# Patient Record
Sex: FEMALE | Race: WHITE | Employment: FULL TIME | URBAN - METROPOLITAN AREA
[De-identification: names, ages, dates, MRNs, and addresses within clinical notes are randomized per-mention and may not be internally consistent; named-entity substitution may affect disease eponyms.]

---

## 2019-08-30 ENCOUNTER — HOSPITAL ENCOUNTER (OUTPATIENT)
Dept: PHYSICAL THERAPY | Age: 79
Discharge: HOME | End: 2019-08-30
Payer: COMMERCIAL

## 2019-08-30 PROCEDURE — 97170 ATHLETIC TRN EVAL MOD CMPLX: CPT

## 2019-08-30 PROCEDURE — 97110 THERAPEUTIC EXERCISES: CPT

## 2019-08-30 PROCEDURE — 97035 APP MDLTY 1+ULTRASOUND EA 15: CPT

## 2019-08-30 NOTE — PROGRESS NOTES
Chapin Black  : 1940  Primary: Erin Bland Rpn  Secondary:  [address] @ [place] [address].  [phone]   [phone]      OUTPATIENT REHABILITATION: Daily Treatment Note  2019   TREATMENT PLAN:  Effective Dates: 2019 TO 2019 (30 days). Frequency/Duration: 3 times a week for 30 Days  GOALS: (Goals have been discussed and agreed upon with patient.)  Short-Term Functional Goals: Time Frame: 1 week  1. Patient will be compliant with her home exercise and treatment instructions. Discharge Goals: Time Frame: 4 weeks  Patient will increase her flexibility through the iliopsoas musculature to allow for pain-free motion and functional pelvic symmetry to tolerate ADL's. Patient will report pain-free positioning in supine and side-lying to allow for a return to her normal  sleep pattern.  ________________________________________________________________________  Pre-treatment Symptoms/Complaints: Right hip stiffness. Pain: Initial: 2/10 Post Session:  2,3/10 Sore after stretching   Medications Last Reviewed:  2019  Updated Objective Findings:  See evaluation note from today     TREATMENT:   THERAPEUTIC EXERCISE: (See flow sheet for minutes):  Exercises per grid below to improve mobility. Required moderate verbal and manual cues to promote proper body mechanics. Progressed repetitions as indicated. MODALITIES: (See flow sheet for  minutes): Ultrasound was used today secondary to the patient having tightened structures limiting joint motion that require an increase in extensibility and to reduce pain over iliopsoas bursa. Vandana Singh Ultrasound was used today to reduce pain and increase tendon flexibility. Date: 19       Modalities: 8 min       Ultrasound, 1Hz, Cont. 1.0 MZ 8 min                       Therapeutic Exercise: 25 min       Lunge Hip Flexor Stretch with manual cueing Hold 5 sec.   X 5       Supine Pelvic tilt with hip flexor manual stretch Hold 10 sec. X 5       Side-Lying Hip Flexor Stretch with manual assist Hold 10 sec. X 5       Prone Press-Up Stretch with manual assist Hold 5 sec. X 5                       Proprioceptive Activities:                        Manual Therapy:                Therapeutic Activities:                  HEP: Given illustrated instructions for exercises and ice application. [employer] Portal  Treatment/Session Summary:    · Response to Treatment:  No reported increase in symptoms from initiation of flexibility exercises. Patient demonstrated understanding of home instructions. .  · Communication/Consultation:  None today  · Equipment provided today:  None today  · Recommendations/Intent for next treatment session: Next visit will focus on increasing flexibility of hip flexors and decreasing bursal irritation of the iliopsoas region. Total Treatment Billable Duration:  60 min.   PT Patient Time In/Time Out  Time In: 1000  Time Out: [facility], [facility]

## 2019-08-30 NOTE — THERAPY EVALUATION
Nestor Black  : 1940 [address], [facility] [place].  [phone]   [phone]        OUTPATIENT REHABILITATION:Initial Assessment 2019         ICD-10: Treatment Diagnosis: Pain in right hip (M25.551)  Psoas tendinitis, right hip (M76.11)  Precautions/Allergies:   Patient has no allergy information on record. Fall Risk Score:     Ambulatory/Rehab Services H2 Model Falls Risk Assessment    Risk Factors:       No Risk Factors Identified Ability to Rise from Chair:       (0)  Ability to rise in a single movement    Falls Prevention Plan:       No modifications necessary   Total: (5 or greater = High Risk): 0     [facility] of [employer]. All Rights Reserved. [facility] [employer] Patent #[id number]. Federal Law prohibits the replication, distribution or use without written permission from [facility] [employer]     MD Orders: Evaluate/Treat MEDICAL/REFERRING DIAGNOSIS:  Leg pain, right [M79.604]   DATE OF ONSET: Insidious onset of 2-4 week duration  REFERRING PHYSICIAN: Melo Sierra MD  RETURN PHYSICIAN APPOINTMENT: To be determined. INITIAL ASSESSMENT:  Ms. Gilles Boudreaux presents with pain, decreased functional mobility in the right anterior hip and right hip flexor weakness. Patient will require therapy interventions to resolve pain and improve function. PROBLEM LIST (Impacting functional limitations):  1. Decreased ADL/Functional Activities  2. Increased Pain  3. Decreased Activity Tolerance  4. Decreased Flexibility/Joint Mobility INTERVENTIONS PLANNED:  1. Home Exercise Program (HEP)  2. Manual Therapy  3. Range of Motion (ROM)  4. Therapeutic Exercise/Strengthening  5. Ultrasound (US)  6. Aquatics    TREATMENT PLAN:  Effective Dates: 2019 TO 2019 (30 days).  Frequency/Duration: 3 times a week for 30 Days  GOALS: (Goals have been discussed and agreed upon with patient.)  Short-Term Functional Goals: Time Frame: 1 week  1. Patient will be compliant with her home exercise and treatment instructions. Discharge Goals: Time Frame: 4 weeks  Patient will increase her flexibility through the iliopsoas musculature to allow for pain-free motion and functional pelvic symmetry to tolerate ADL's. Patient will report pain-free positioning in supine and side-lying to allow for a return to her normal sleep pattern. Rehabilitation Potential For Stated Goals: Good  Regarding Eloise Black's therapy, I certify that the treatment plan above will be carried out by a therapist or under their direction. 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 Activity and movement seem to relieve the pain, but the current level of discomfort is interrupting the patient's ability to sleep and find a comfortable position. Onset occurred approximately 2-4 weeks ago and was proceeded by a fall this summer, which resulted in a left ankle sprain and low back pain. She has recovered from the fall and denies any lingering back pain. The patient localizes the hip pain and tightness to her groin area, radiating down the thigh medially and causing her right knee to feel stiff. Her goal is to resolve her hip pain so she can sleep and continue her fitness activities of piliates and walking. Past Medical History/Comorbidities:   Ms. Elliot Steele  has no past medical history on file. Ms. Elliot Steele  has no past surgical history on file. 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   Dominant Side:         RIGHT  Current Medications:  No current outpatient medications on file. Date Last Reviewed:  8/30/2019   # of Personal Factors/Comorbidities that affect the Plan of Care: 0: LOW COMPLEXITY   EXAMINATION:   Observation/Orthostatic Postural Assessment:          No groin swelling noted. No postural deviations noted. Palpation:          Mild tenderness with deep palpation over the iliopsoas bursae. No trochanteric tenderness. ROM:     Right Hip: Flexion 90 °                   Extension 25 °                   Abduction 35 °                   Adduction 35 °    Left Hip: Flexion 95 °                Extension 35 °                Abduction 40 °                Adduction 40 °          Strength:     Right Hip: Flexion 4/5                 Abduction 5/5                 Adduction 5/5   Left Hip: Flexion 5/5              Abduction 5/5              Adduction 5/5   Special Tests:          Scours negative; YOLETTE negative; YOON's negative  Neurological Screen:        Sensation: Intact  Functional Mobility:         Anterior hip mobility limited. Balance:          WFL   Body Structures Involved:  1. Joints  2. Muscles  3. Ligaments Body Functions Affected:  1. Neuromusculoskeletal  2. Movement Related Activities and Participation Affected:  1. None   # of elements that affect the Plan of Care: 3: MODERATE COMPLEXITY   CLINICAL PRESENTATION:   Presentation: Evolving clinical presentation with changing clinical characteristics: MODERATE COMPLEXITY   CLINICAL DECISION MAKING:   Tool Used: Lower Extremity Functional Scale (LEFS)  Score:  Initial: /80 Most Recent: X/80 (Date: -- )   Interpretation of Score: 20 questions each scored on a 5 point scale with 0 representing \"extreme difficulty or unable to perform\" and 4 representing \"no difficulty\". The lower the score, the greater the functional disability. 80/80 represents no disability. Minimal detectable change is 9 points.         Medical Necessity:   · Patient demonstrates good rehab potential due to higher previous functional level. Reason for Services/Other Comments:  · Patient continues to require skilled intervention due to functional right hip flexor weakness and decreased hip mobility resulting in pain. .   Use of outcome tool(s) and clinical judgement create a POC that gives a: Questionable prediction of patient's progress: MODERATE COMPLEXITY     Total Treatment Duration:  PT Patient Time In/Time Out  Time In: 1000  Time Out: [address], ATC

## 2019-09-04 ENCOUNTER — HOSPITAL ENCOUNTER (OUTPATIENT)
Dept: PHYSICAL THERAPY | Age: 79
Discharge: HOME | End: 2019-09-04
Payer: COMMERCIAL

## 2019-09-04 PROCEDURE — 97035 APP MDLTY 1+ULTRASOUND EA 15: CPT

## 2019-09-04 PROCEDURE — 97110 THERAPEUTIC EXERCISES: CPT

## 2019-09-04 NOTE — PROGRESS NOTES
Ryann Black  : 1940  Primary: Alla Bland Rpn  Secondary:  [address] [facility] @ [address], [address] [place] [address]  [phone]   [phone]      OUTPATIENT REHABILITATION: Daily Treatment Note  2019   TREATMENT PLAN:  Effective Dates: 2019 TO 2019 (30 days). Frequency/Duration: 3 times a week for 30 Days  GOALS: (Goals have been discussed and agreed upon with patient.)  Short-Term Functional Goals: Time Frame: 1 week  1. Patient will be compliant with her home exercise and treatment instructions. Discharge Goals: Time Frame: 4 weeks  Patient will increase her flexibility through the iliopsoas musculature to allow for pain-free motion and functional pelvic symmetry to tolerate ADL's. Patient will report pain-free positioning in supine and side-lying to allow for a return to her normal  sleep pattern.  ________________________________________________________________________  Pre-treatment Symptoms/Complaints: Patient reports feeling better and sleeping on her back last night for 4-5 hours without anterior hip pain. Main complaint today is knee stiffness and anterior soreness. 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 Current anterior hip tightness is exasperating knee discomfort . Knee flexes fully to   130 ° . Patient moves more comfortably on treatment table today, moving from supine to side-lying to prone with decreased guarding. TREATMENT:   THERAPEUTIC EXERCISE: (See flow sheet for minutes):  Exercises per grid below to improve mobility. Required moderate verbal and manual cues to promote proper body mechanics. Progressed repetitions as indicated.   MODALITIES: (See flow sheet for  minutes): Ultrasound was used today secondary to the patient having tightened structures limiting joint motion that require an increase in extensibility and to reduce pain over iliopsoas bursa. Derrick Marinelli Ultrasound was used today to reduce pain and increase tendon flexibility. Date: 8/30/19 9/4/19      Modalities: 18 min 25 min      Ultrasound, 1Hz, Cont. 1.0 MZ 8 min 8 min      MHP anterior hip  7 min      ice 10 min 10 min      Therapeutic Exercise: 25 min 30 min      Lunge Hip Flexor Stretch with manual cueing Hold 5 sec. X 5 Hold 10 sec. X 5      Supine Pelvic tilt with hip flexor manual stretch Hold 10 sec. X 5  10 sec. X 5      Side-Lying Hip Flexor Stretch with manual assist Hold 10 sec. X 5 Hold 10 sec. X 5      Prone Press-Up Stretch with manual assist Hold 5 sec. X 5 Hold 5 sec. X 10      Supine Hamstring Stretch manaul assist  Hold 10 sec. X 5      Supine Hamstring Strap Stretch  Hold 10 sec. X 5      Proprioceptive Activities:                        Manual Therapy:                Therapeutic Activities:                  HEP: Given illustrated instructions for exercises and ice application. [employer] Portal  Treatment/Session Summary:    · Response to Treatment:  Patient demonstrated more comfortable movement through the hip today. · Communication/Consultation:  None today  · Equipment provided today:  None today and Stretch strap issued for HEP (9/4/19)  · Recommendations/Intent for next treatment session: Next visit will focus on increasing flexibility of hip flexors and decreasing bursal irritation of the iliopsoas region. Total Treatment Billable Duration:  55 min.   PT Patient Time In/Time Out  Time In: 1100  Time Out: 52 [facility], [facility]

## 2019-09-06 ENCOUNTER — HOSPITAL ENCOUNTER (OUTPATIENT)
Dept: PHYSICAL THERAPY | Age: 79
Discharge: HOME | End: 2019-09-06
Payer: COMMERCIAL

## 2019-09-06 PROCEDURE — 97110 THERAPEUTIC EXERCISES: CPT

## 2019-09-06 PROCEDURE — 97035 APP MDLTY 1+ULTRASOUND EA 15: CPT

## 2019-09-06 NOTE — PROGRESS NOTES
Jocelynn Black  : 1940  Primary: Km Bland Rpn  Secondary:  [address] @ [address]  [phone]   [phone]      OUTPATIENT REHABILITATION: Daily Treatment Note  2019   TREATMENT PLAN:  Effective Dates: 2019 TO 2019 (30 days). Frequency/Duration: 3 times a week for 30 Days  GOALS: (Goals have been discussed and agreed upon with patient.)  Short-Term Functional Goals: Time Frame: 1 week  1. Patient will be compliant with her home exercise and treatment instructions. MET  Discharge Goals: Time Frame: 4 weeks  Patient will increase her flexibility through the iliopsoas musculature to allow for pain-free motion and functional pelvic symmetry to tolerate ADL's. Patient will report pain-free positioning in supine and side-lying to allow for a return to her normal  sleep pattern.  ________________________________________________________________________  Pre-treatment Symptoms/Complaints: Patient reports feeling better . Pain: Initial: 1/10 Post Session:  0/10    Medications Last Reviewed:  2019  Updated Objective Findings:    . Patient moves more comfortably on treatment table today, moving from supine to side-lying to prone with decreased guarding. TREATMENT:   THERAPEUTIC EXERCISE: (See flow sheet for minutes):  Exercises per grid below to improve mobility. Required moderate verbal and manual cues to promote proper body mechanics. Progressed repetitions as indicated. MODALITIES: (See flow sheet for  minutes): Ultrasound was used today secondary to the patient having tightened structures limiting joint motion that require an increase in extensibility and to reduce pain over iliopsoas bursa. Silver Forester Ultrasound was used today to reduce pain and increase tendon flexibility. Date: 19     Modalities: 18 min 25 min 25 min     Ultrasound, 1Hz, Cont.  1.0 MZ 8 min 8 min 8 min     P anterior hip  7 min 7 min     ice 10 min 10 min 10 min     Therapeutic Exercise: 25 min 30 min 30 min     Lunge Hip Flexor Stretch with manual cueing Hold 5 sec. X 5 Hold 10 sec. X 5 HEP     Supine Pelvic tilt with hip flexor manual stretch Hold 10 sec. X 5  10 sec. X 5 10 sec. X 5     Side-Lying Hip Flexor Stretch with manual assist Hold 10 sec. X 5 Hold 10 sec. X 5 10 sec. X 5     Prone Press-Up Stretch with manual assist Hold 5 sec. X 5 Hold 5 sec. X 10 Hold 5 sec. X 10     Supine Hamstring Stretch manaul assist  Hold 10 sec. X 5 Manual Hamstring Stretch     Side-lying Hip Abduction w/extension position   X 20     Prone Hip Extension w/ knee flexed   X 20     Supine Hamstring Strap Stretch  Hold 10 sec. X 5 HEP     Proprioceptive Activities:                Manual Therapy:                Therapeutic Activities:                  HEP: Given illustrated instructions for exercises and ice application. [employer] Portal  Treatment/Session Summary:    · Response to Treatment:  Patient able to control hip position effectively to perform active movement patterns. ·  Communication/Consultation:  None today  · Equipment provided today:  None today and Stretch strap issued for HEP (9/4/19)  · Recommendations/Intent for next treatment session: Next visit will focus on increasing flexibility of hip flexors and decreasing bursal irritation of the iliopsoas region. Total Treatment Billable Duration:  55 min.   PT Patient Time In/Time Out  Time In: 1100  Time Out: 52 [facility], [facility]

## 2019-09-09 ENCOUNTER — HOSPITAL ENCOUNTER (OUTPATIENT)
Dept: PHYSICAL THERAPY | Age: 79
Discharge: HOME | End: 2019-09-09
Payer: COMMERCIAL

## 2019-09-09 PROCEDURE — 97110 THERAPEUTIC EXERCISES: CPT

## 2019-09-09 NOTE — PROGRESS NOTES
Jake Black  : 1940  Primary: Sonia Bland Rpn  Secondary:  [address]  [phone]   [phone]      OUTPATIENT REHABILITATION: Daily Treatment Note  9/10/2019   TREATMENT PLAN:  Effective Dates: 2019 TO 2019 (30 days). Frequency/Duration: 3 times a week for 30 Days  GOALS: (Goals have been discussed and agreed upon with patient.)  Short-Term Functional Goals: Time Frame: 1 week  1. Patient will be compliant with her home exercise and treatment instructions. MET  Discharge Goals: Time Frame: 4 weeks  Patient will increase her flexibility through the iliopsoas musculature to allow for pain-free motion and functional pelvic symmetry to tolerate ADL's. PROGRESSING  Patient will report pain-free positioning in supine and side-lying to allow for a return to her normal  sleep pattern. PROGRESSING  ________________________________________________________________________  Pre-treatment Symptoms/Complaints: Patient reports feeling greater mobility in hip and leg. Sleeping better without pain causing her to wake. .  Pain: Initial: 1/10 Post Session:  0/10    Medications Last Reviewed:  9/10/2019  Updated Objective Findings:    Increased hip flexor range and resolving iliopsoas bursal irritation. TREATMENT:   THERAPEUTIC EXERCISE: (See flow sheet for minutes):  Exercises per grid below to improve mobility. Required moderate verbal and manual cues to promote proper body mechanics. Progressed repetitions as indicated. MODALITIES: (See flow sheet for  minutes): Ultrasound was used today secondary to the patient having tightened structures limiting joint motion that require an increase in extensibility and to reduce pain over iliopsoas bursa. Macario Motto Ultrasound was used today to reduce pain and increase tendon flexibility.       Date: 19    Modalities: 18 min 25 min 25 min 17 min Ultrasound, 1Hz, Cont. 1.0 MZ 8 min 8 min 8 min     MHP anterior hip  7 min 7 min 7 min    ice 10 min 10 min 10 min 10 min    Therapeutic Exercise: 25 min 30 min 30 min 30 min    Lunge Hip Flexor Stretch with manual cueing Hold 5 sec. X 5 Hold 10 sec. X 5 HEP Hold 10 sec. X 5    Supine Pelvic tilt with hip flexor manual stretch Hold 10 sec. X 5  10 sec. X 5 10 sec. X 5 10 sec. X 5    Side-Lying Hip Flexor Stretch with manual assist Hold 10 sec. X 5 Hold 10 sec. X 5 10 sec. X 5 10 sec. X 5    Prone Press-Up Stretch with manual assist Hold 5 sec. X 5 Hold 5 sec. X 10 Hold 5 sec. X 10 Hold 5 sec. X 10    Supine Hamstring Stretch manaul assist  Hold 10 sec. X 5 Manual Hamstring Stretch Manual  Hamstring  Stretch    Side-lying Hip Abduction w/extension position   X 20 X 20    Prone Hip Extension w/ knee flexed   X 20 X 20    Side-lying ITB Stretch    10 sec. X 5    Supine Lateral Hip Stretch    10 sec. X 5    Supine Hamstring Strap Stretch  Hold 10 sec. X 5 HEP HEP    Proprioceptive Activities:                Manual Therapy:                Therapeutic Activities:                  HEP: Given illustrated instructions for exercises and ice application. [employer] Portal  Treatment/Session Summary:    · Response to Treatment:  Patient exhibits greater hip mobility. ·  Communication/Consultation:  None today  · Equipment provided today:  None today and Stretch strap issued for HEP (9/4/19)  Recommendations/Intent for next treatment session: Next visit will focus on increasing flexibility of hip flexors and increasing hip flexor strength. Total Treatment Billable Duration:  30 min.   PT Patient Time In/Time Out  Time In: 2943  Time Out: [facility]

## 2019-09-11 ENCOUNTER — HOSPITAL ENCOUNTER (OUTPATIENT)
Dept: PHYSICAL THERAPY | Age: 79
Discharge: HOME | End: 2019-09-11
Payer: COMMERCIAL

## 2019-09-11 PROCEDURE — 97110 THERAPEUTIC EXERCISES: CPT

## 2019-09-11 NOTE — PROGRESS NOTES
Pawan Black  : 1940  Primary: Marii Bland Mendoza  Secondary:  [address]  [phone]   [phone]      OUTPATIENT REHABILITATION: Daily Treatment Note  2019   TREATMENT PLAN:  Effective Dates: 2019 TO 2019 (30 days). Frequency/Duration: 3 times a week for 30 Days  GOALS: (Goals have been discussed and agreed upon with patient.)  Short-Term Functional Goals: Time Frame: 1 week  1. Patient will be compliant with her home exercise and treatment instructions. MET  Discharge Goals: Time Frame: 4 weeks  Patient will increase her flexibility through the iliopsoas musculature to allow for pain-free motion and functional pelvic symmetry to tolerate ADL's. MET  Patient will report pain-free positioning in supine and side-lying to allow for a return to her normal  sleep pattern. PROGRESSING  ________________________________________________________________________  Pre-treatment Symptoms/Complaints: Patient reports feeling greater mobility in hip and leg. Pain: Initial: 0/10 Post Session:  0/10    Medications Last Reviewed:  2019  Updated Objective Findings:    Increased hip flexor range and resolving iliopsoas bursal irritation. TREATMENT:   THERAPEUTIC EXERCISE: (See flow sheet for minutes):  Exercises per grid below to improve mobility. Required moderate verbal and manual cues to promote proper body mechanics. Progressed repetitions as indicated. MODALITIES: (See flow sheet for  minutes): Ultrasound was used today secondary to the patient having tightened structures limiting joint motion that require an increase in extensibility and to reduce pain over iliopsoas bursa. Lord Oscar Ultrasound was used today to reduce pain and increase tendon flexibility. Date: 19   Modalities: 18 min 25 min 25 min 17 min 17 min   Ultrasound, 1Hz, Cont.  1.0 MZ 8 min 8 min 8 min     MHP anterior hip  7 min 7 min 7 min 7 min   ice 10 min 10 min 10 min 10 min 10 min   Therapeutic Exercise: 25 min 30 min 30 min 30 min 40 min   Lunge Hip Flexor Stretch with manual cueing Hold 5 sec. X 5 Hold 10 sec. X 5 HEP Hold 10 sec. X 5 10 sec. X 5   Supine Pelvic tilt with hip flexor manual stretch Hold 10 sec. X 5  10 sec. X 5 10 sec. X 5 10 sec. X 5 10 sec. X 5   Side-Lying Hip Flexor Stretch with manual assist Hold 10 sec. X 5 Hold 10 sec. X 5 10 sec. X 5 10 sec. X 5 10 sec. X 5   Prone Press-Up Stretch with manual assist Hold 5 sec. X 5 Hold 5 sec. X 10 Hold 5 sec. X 10 Hold 5 sec. X 10 HEP   Supine Hamstring Stretch manaul assist  Hold 10 sec. X 5 Manual Hamstring Stretch Manual  Hamstring  Stretch Manual Hamstring  10 sec. X 5   Side-lying Hip Abduction w/extension position   X 20 X 20 HEP   Prone Hip Extension w/ knee flexed   X 20 X 20 HEP   Side-lying ITB Stretch with manual assist    10 sec. X 5 10 sec. x 5   Supine Lateral Hip Stretch with manual assist    10 sec. X 5 10 sec. X 5   Standing Hip Flexion with band resistance     Blue CLX x 10   Standing Hip Extension with band resistance     Blue CLX  X 10   Standing Hip Abduc/Adduc with band resistance     Blue CLX  X 10   Supine Hamstring Strap Stretch  Hold 10 sec. X 5 HEP HEP HEP   Proprioceptive Activities:                Manual Therapy:                Therapeutic Activities:                  HEP: Given illustrated instructions for exercises and ice application. [employer] Portal  Treatment/Session Summary:    · Response to Treatment:  Patient required frequent verbal cueing to perform resistance exercises correctly. No reported discomfort with added resistance.   ·  Communication/Consultation:  None today  · Equipment provided today:  None today and Stretch strap issued for HEP (9/4/19)  Recommendations/Intent for next treatment session: Next visit will focus on increasing flexibility of hip flexors and increasing hip flexor strength. Total Treatment Billable Duration:  40 min.   PT Patient Time In/Time Out  Time In: 1100  Time Out: 52 [facility], [facility]

## 2019-09-13 ENCOUNTER — APPOINTMENT (OUTPATIENT)
Dept: PHYSICAL THERAPY | Age: 79
End: 2019-09-13
Payer: COMMERCIAL

## 2019-09-13 ENCOUNTER — HOSPITAL ENCOUNTER (OUTPATIENT)
Dept: PHYSICAL THERAPY | Age: 79
Discharge: HOME | End: 2019-09-13
Payer: COMMERCIAL

## 2019-09-16 ENCOUNTER — HOSPITAL ENCOUNTER (OUTPATIENT)
Dept: PHYSICAL THERAPY | Age: 79
Discharge: HOME | End: 2019-09-16
Payer: COMMERCIAL

## 2019-09-16 PROCEDURE — 97110 THERAPEUTIC EXERCISES: CPT

## 2019-09-16 NOTE — THERAPY EVALUATION
Travon Black  : 1940 [address], [address] [place] [address]  [phone]   [phone]        OUTPATIENT REHABILITATION:Discharge 2019         ICD-10: Treatment Diagnosis: Pain in right hip (M25.551)  Psoas tendinitis, right hip (M76.11)  Precautions/Allergies:   Patient has no allergy information on record. Fall Risk Score:     Ambulatory/Rehab Services H2 Model Falls Risk Assessment    Risk Factors:       No Risk Factors Identified Ability to Rise from Chair:       (0)  Ability to rise in a single movement    Falls Prevention Plan:       No modifications necessary   Total: (5 or greater = High Risk): 0     [facility] [place]. All Rights Reserved. [facility] Patent #[id number]. Federal Law prohibits the replication, distribution or use without written permission from [facility] [employer]     MD Orders: Evaluate/Treat MEDICAL/REFERRING DIAGNOSIS:  Leg pain, right [M79.604]   DATE OF ONSET: Insidious onset of 2-4 week duration  REFERRING PHYSICIAN: Deepak Hurtado MD  RETURN PHYSICIAN APPOINTMENT: To be determined. INITIAL ASSESSMENT:  Ms. Camilo Hunter presents with pain, decreased functional mobility in the right anterior hip and right hip flexor weakness. Patient will require therapy interventions to resolve pain and improve function. PROBLEM LIST (Impacting functional limitations):  1. Decreased ADL/Functional Activities  2. Increased Pain  3. Decreased Activity Tolerance  4. Decreased Flexibility/Joint Mobility INTERVENTIONS PLANNED:  1. Home Exercise Program (HEP)  2. Manual Therapy  3. Range of Motion (ROM)  4. Therapeutic Exercise/Strengthening  5. Ultrasound (US)  6. Aquatics    TREATMENT PLAN:  Effective Dates: 2019 TO 2019 (30 days).  Frequency/Duration: 3 times a week for 30 Days  GOALS: (Goals have been discussed and agreed upon with patient.)  Short-Term Functional Goals: Time Frame: 1 week  1. Patient will be compliant with her home exercise and treatment instructions. MET  Discharge Goals: Time Frame: 4 weeks  Patient will increase her flexibility through the iliopsoas musculature to allow for pain-free motion and functional pelvic symmetry to tolerate ADL's. MET  Patient will report pain-free positioning in supine and side-lying to allow for a return to her normal sleep pattern. 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 Activity and movement seem to relieve the pain, but the current level of discomfort is interrupting the patient's ability to sleep and find a comfortable position. Onset occurred approximately 2-4 weeks ago and was proceeded by a fall this summer, which resulted in a left ankle sprain and low back pain. She has recovered from the fall and denies any lingering back pain. The patient localizes the hip pain and tightness to her groin area, radiating down the thigh medially and causing her right knee to feel stiff. Her goal is to resolve her hip pain so she can sleep and continue her fitness activities of piliates and walking. Past Medical History/Comorbidities:   Ms. Sandra Perez  has no past medical history on file. Ms. Sandra Perez  has no past surgical history on file. 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 Dominant Side:         RIGHT  Current Medications:  No current outpatient medications on file. Date Last Reviewed:  9/16/2019   EXAMINATION:   Observation/Orthostatic Postural Assessment:          No groin swelling noted. No postural deviations noted. Palpation:    No tenderness.   ROM:     Right Hip: Flexion 90 °                   Extension 25 °                   Abduction 35 °                   Adduction 35 °   9/16/19:Extension 40 °               Abduction 40 °               Adduction 45 °  Left Hip: Flexion 95 °                Extension 35 °                Abduction 40 °                Adduction 40 °          Strength:     Right Hip: Flexion 4/5                 Abduction 5/5                 Adduction 5/5  9/16/19: 5/5 all planes of motion   Left Hip: Flexion 5/5              Abduction 5/5              Adduction 5/5   Special Tests:          Scours negative; YOLETTE negative; YOON's negative  Neurological Screen:        Sensation: Intact  Functional Mobility:        WFL  Balance:          WFL   Body Structures Involved:  1. Joints  2. Muscles  3. Ligaments Body Functions Affected:  1. Neuromusculoskeletal  2. Movement Related Activities and Participation Affected:  1. None   CLINICAL PRESENTATION:   CLINICAL DECISION MAKING:   Tool Used: Lower Extremity Functional Scale (LEFS)  Score:  Initial:56 /80 Most Recent:60 X/80 (Date: 9/16/19 )   Interpretation of Score: 20 questions each scored on a 5 point scale with 0 representing \"extreme difficulty or unable to perform\" and 4 representing \"no difficulty\". The lower the score, the greater the functional disability. 80/80 represents no disability. Minimal detectable change is 9 points.      Total Treatment Duration:  PT Patient Time In/Time Out  Time In: 1330  Time Out: [address], ATC

## 2019-09-16 NOTE — PROGRESS NOTES
Chapin Black  : 1940  Primary: [employer]  Secondary:  [address] [facility] @ [address], [address] [place] [address]  [phone]   [phone]      OUTPATIENT REHABILITATION: Daily Treatment Note  2019   TREATMENT PLAN:  Effective Dates: 2019 TO 2019 (30 days). Frequency/Duration: 3 times a week for 30 Days  GOALS: (Goals have been discussed and agreed upon with patient.)  Short-Term Functional Goals: Time Frame: 1 week  1. Patient will be compliant with her home exercise and treatment instructions. MET  Discharge Goals: Time Frame: 4 weeks  Patient will increase her flexibility through the iliopsoas musculature to allow for pain-free motion and functional pelvic symmetry to tolerate ADL's. MET  Patient will report pain-free positioning in supine and side-lying to allow for a return to her normal  sleep pattern. MET  ________________________________________________________________________  Pre-treatment Symptoms/Complaints: Patient reports feeling greater mobility in hip and leg. Reports walking approximately 3 hours without any significant return of symptoms. Exercises relieve any tightness she feels. Sleeping comfortably. Pain: Initial: 0/10 Post Session:  0/10    Medications Last Reviewed:  2019  Updated Objective Findings:    Increased hip flexor range . TREATMENT:   THERAPEUTIC EXERCISE: (See flow sheet for minutes):  Exercises per grid below to improve mobility. Required moderate verbal and manual cues to promote proper body mechanics. Progressed repetitions as indicated. MODALITIES: (See flow sheet for  minutes): Ultrasound was used today secondary to the patient having tightened structures limiting joint motion that require an increase in extensibility and to reduce pain over iliopsoas bursa. Jovita Orlando Ultrasound was used today to reduce pain and increase tendon flexibility.       Date: 19 9/16/19   Modalities: 18 min 25 min 25 min 17 min 17 min 7 min   Ultrasound, 1Hz, Cont. 1.0 MZ 8 min 8 min 8 min      MHP anterior hip  7 min 7 min 7 min 7 min 7 min   ice 10 min 10 min 10 min 10 min 10 min    Therapeutic Exercise: 25 min 30 min 30 min 30 min 40 min 30 min   Lunge Hip Flexor Stretch with manual cueing Hold 5 sec. X 5 Hold 10 sec. X 5 HEP Hold 10 sec. X 5 10 sec. X 5 HEP   Supine Pelvic tilt with hip flexor manual stretch Hold 10 sec. X 5  10 sec. X 5 10 sec. X 5 10 sec. X 5 10 sec. X 5 10 sec. X 5   Side-Lying Hip Flexor Stretch with manual assist Hold 10 sec. X 5 Hold 10 sec. X 5 10 sec. X 5 10 sec. X 5 10 sec. X 5 10 sec. X 5   Prone Press-Up Stretch with manual assist Hold 5 sec. X 5 Hold 5 sec. X 10 Hold 5 sec. X 10 Hold 5 sec. X 10 HEP Hold 5 sec. X 10   Supine Hamstring Stretch manaul assist  Hold 10 sec. X 5 Manual Hamstring Stretch Manual  Hamstring  Stretch Manual Hamstring  10 sec. X 5 Manual Hamstring  10 sec. X 5   Side-lying Hip Abduction w/extension position   X 20 X 20 HEP HEP   Prone Hip Extension w/ knee flexed   X 20 X 20 HEP HEP   Side-lying ITB Stretch with manual assist    10 sec. X 5 10 sec. x 5 10 sec. X 5   Supine Lateral Hip Stretch with manual assist    10 sec. X 5 10 sec. X 5 10 sec. X 5   Standing Hip Flexion with band resistance     Blue CLX x 10 HEP   Standing Hip Extension with band resistance     Blue CLX  X 10 HEP   Standing Hip Abduc/Adduc with band resistance     Blue CLX  X 10 HEP   Supine Hamstring Strap Stretch  Hold 10 sec. X 5 HEP HEP HEP HEP   Proprioceptive Activities:                  Manual Therapy:                  Therapeutic Activities:                    HEP: Given illustrated instructions for exercises and ice application. HEP reviewed. [employer] Portal  Treatment/Session Summary:    · Response to Treatment:  Patient demonstrates understanding of home exercise program. She has returned to her fitness walking without a return of symptoms.  She has met rehab goals. ·  Communication/Consultation:  None today  · Equipment provided today:  None today and Stretch strap issued for HEP (9/4/19)  Recommendations/Intent for next treatment session: D/C to independent program.  Total Treatment Billable Duration:  30 min.   PT Patient Time In/Time Out  Time In: 1330  Time Out: [address] [facility], [facility]

## 2019-09-18 ENCOUNTER — APPOINTMENT (OUTPATIENT)
Dept: PHYSICAL THERAPY | Age: 79
End: 2019-09-18
Payer: COMMERCIAL

## 2019-09-20 ENCOUNTER — APPOINTMENT (OUTPATIENT)
Dept: PHYSICAL THERAPY | Age: 79
End: 2019-09-20
Payer: COMMERCIAL

## 2021-11-01 ENCOUNTER — HOSPITAL ENCOUNTER (OUTPATIENT)
Dept: PHYSICAL THERAPY | Age: 81
Discharge: HOME | End: 2021-11-01
Payer: COMMERCIAL

## 2021-11-01 PROCEDURE — 97162 PT EVAL MOD COMPLEX 30 MIN: CPT

## 2021-11-01 PROCEDURE — 97140 MANUAL THERAPY 1/> REGIONS: CPT

## 2021-11-01 PROCEDURE — 97110 THERAPEUTIC EXERCISES: CPT

## 2021-11-01 NOTE — PROGRESS NOTES
Daniel Black  : 1940  Primary: [facility] [employer] [employer]  Secondary:  [address] @ [address] [address].  [phone]   [phone]      OUTPATIENT PHYSICAL THERAPY: Daily Treatment Note  2021    ICD-10: Treatment Diagnosis: Pain in right wrist (M25.531), Stiffness of right wrist, not elsewhere classified (M25.631), Pain in left wrist (M25.532), Stiffness of left wrist, not elsewhere classified (M25.632), Stiffness of right hand, not elsewhere classified (M25.641) and Stiffness of left hand, not elsewhere classified (M25.642)  MEDICAL/REFERRING DIAGNOSIS:  Status post bilateral distal radius fractures   TREATMENT PLAN:  Effective Dates: 2021 TO 2021 (60 days). Frequency/Duration: 2 times a week for 60 Days  GOALS: (Goals have been discussed and agreed upon with patient.)  Short-Term Functional Goals: Time Frame: 4 weeks:   1. Independent performance of home exercise program  2. Reduce R hand edema to normal levels to allow normal finger to palm fist motions  3. Increase R wrist/hand ROM's to 75% or better of normal  In all planes of motion  Discharge Goals: Time Frame: 8 weeks  1. Pt to demonstrate at least 35#   Strength L/R for normal weight carrying  2. Pt to demonstrate L/R wrist/ hand ROM's 85% or better of normal all planes   3. Improve Quick-DASH scores to 15/55 or better to reflect return to normal ADL function  Rehabilitation Potential For Stated Goals: Good  _________________________________________________________________________  Pre-treatment Symptoms/Complaints:  See today's initial evaluation report  Pain: Initial: 4-5/10 Post Session:  No increase/10   Medications Last Reviewed:  2021  Updated Objective Findings:  Below measures from initial evaluation unless otherwise noted. Observation/Orthostatic Postural Assessment:    Pt is wearing bilateral wrist splints.  She declined to fully removing the right wrist splint for examination due to her surgical incisions having been redresseed earlier this afternoon. Left distal forearm has a 9cm long incision closed with steristrips, without drainage. The right digits are moderately edematous. Palpation:    Diffuse right digital and distal hand (palmar /dorsal )pitting edema noted  ROM:    Left : Pt demonstrates ability to perform full left hook, open and full fists. AROM's : wrist flexion = 40 deg, extension = 45 deg, pronation/supination 75% of full AROM, Ulnar deviation = 18 deg, radial deviation = 20 deg. Right: Limited examination due to pt request to not remove splint today: Passive /active digit ROM's: 2-5 =  25 to 50% flexion with full extension at PIP/DIP/MCP's, Thumb IP = full extension, 50% flexion, MCP = full extension, 25% flexion. [facility] deferred due to dressing. Wrist flexion ~ 15 deg, extension ~ 15 deg, forearm active/passive pronation/supination  = 50 deg to 20 deg short of neutral.  Strength:   Left:  digits all 3+/5, Wrist flex/ext all 3/5  Right: digits all 2+/5, wrist flex/ext 2+/5  Special Tests:    deferred  Neurological Screen:  Grossly intact L/R hand light touch   Functional Mobility:   Performs sit-stand transfers and walks independently  Balance:    Intact normal base standing balance. TREATMENT:   THERAPEUTIC ACTIVITY: ( see below for minutes): Therapeutic activities per grid below to improve mobility, strength, balance and coordination. Required minimal visual, verbal, manual and tactile cues to improve independence and safety with daily activities . THERAPEUTIC EXERCISE: (see below for minutes):  Exercises per grid below to improve mobility, strength, balance and coordination. Required minimal verbal and manual cues to promote proper body alignment, promote proper body posture and promote proper body mechanics. Progressed resistance, range, repetitions and complexity of movement as indicated.   MANUAL THERAPY: (see below for minutes): Joint mobilization and Soft tissue mobilization was utilized and necessary because of the patient's restricted joint motion, painful spasm, loss of articular motion and restricted motion of soft tissue. MODALITIES: (see below for minutes):      to decrease pain    SELF CARE: (see below for minutes): Procedure(s) (per grid) utilized to improve and/or restore self-care/home management as related to dressing, bathing and grooming. Required minimal verbal cueing to facilitate activities of daily living skills and compensatory activities. Date: 11/1/2021 (visit 1)       Modalities:                                Therapeutic Exercise: 18 min        R digital 1, 2/5 PROM's: 10 min        R wrist flex/ext aarom: 2 min        R forearm aarom pron/sup: 1 min        HEP instruction ( see notes below) - all exercises practiced: 5 min                       Proprioceptive Activities:                                Manual Therapy: 10 min        Edema massage/milking techniques - manual and index finger elastic wrapping: 10 min               Therapeutic Activities:                                  HEP:Access Code: Bernadette Obrien  URL: [email or web address]  Date: 11/01/2021  Prepared by: Tara Lozoya    Exercises  Seated Wrist Supination Stretch - 2 x daily - 5 sets - 30 second hold  Seated Wrist Flexor Hook Fist Tendon Gliding - 2 x daily - 2 sets - 10 reps - 5 hold  Passive Hook Fist - 2 x daily - 5 reps - 30 second hold  Thumb DIP PROM - 2 x daily - 2 sets - 10 reps - 5 second hold  Thumb PROM Composite Flexion - 2 x daily - 2 sets - 10 reps - 5 second hold      [id number]  Treatment/Session Summary:    · Response to Treatment:  Patient is independent with performance of above described home program.  · Communication/Consultation:  None today  · Equipment provided today:  None today  · Recommendations/Intent for next treatment session: Next visit will focus on manual therapy techniques and positioning to decrease R hand edema, P-AA-AROM L/R forearm/wrist/hand, modalities as needed to modulate pain/swelling, dressing change and elastic sleeve for swelling control.   · .    Total Treatment Billable Duration:  53 min  PT Patient Time In/Time Out  Time In: 8754  Time Out: [address], PT    Future Appointments   Date Time Provider Kamille Simmonsi   11/3/2021  4:15 PM Cece Plascencia, PT [facility]   11/8/2021  1:00 PM Kolton Kathleen PA [facility]   11/15/2021  4:15 PM Cece Plascencia, PT [facility]   11/17/2021  4:15 PM Cece Plascencia, PT [facility]   11/23/2021  2:00 PM Onofre Barragan, PT [facility]

## 2021-11-01 NOTE — THERAPY EVALUATION
Josias Black  : 1940 [place] [address] [facility].  [phone]   [phone]        OUTPATIENT PHYSICAL THERAPY:Initial Assessment 2021         ICD-10: Treatment Diagnosis: Pain in right wrist (M25.531), Stiffness of right wrist, not elsewhere classified (M25.631), Pain in left wrist (M25.532), Stiffness of left wrist, not elsewhere classified (M25.632), Stiffness of right hand, not elsewhere classified (M25.641) and Stiffness of left hand, not elsewhere classified (V33.807)  Precautions/Allergies:   Patient has no allergy information on record. Fall Risk Score:     Ambulatory/Rehab Services H2 Model Falls Risk Assessment    Risk Factors:       (5)  History of Recent Falls [w/in 3 months] Ability to Rise from Chair:       (0)  Ability to rise in a single movement    Falls Prevention Plan:       No modifications necessary   Total: (5 or greater = High Risk): 5     [facility] of [employer]. All Rights Reserved. [facility] Patent #[id number]. Federal Law prohibits the replication, distribution or use without written permission from [employer]     MD Orders: Evaluate and Treat: 2# weightbearing bilaterally, range of motion as tolerated active and passive, no restrictions. MEDICAL/REFERRING DIAGNOSIS:  Status post bilateral distal radius fractures   DATE OF ONSET: 10/13/2021  REFERRING PHYSICIAN: Janey Mart MD  RETURN PHYSICIAN APPOINTMENT:      INITIAL ASSESSMENT:  Ms. David Espinosa presents with diminished right > left forearm/wrist/hand ROMs, right hand /wrist edema, healing surgical incisions, s/p bilateral distal radius fractures and ORIF bilateral distal radii. Her impairments hinder her ability to use either hand for gripping/grasping/dexterity, lifting, carrying, writing. She requires skilled PT to address these impairments to restore normal function.     PROBLEM LIST (Impacting functional limitations):  1. Decreased Strength  2. Decreased ADL/Functional Activities  3. Increased Pain  4. Decreased Flexibility/Joint Mobility  5. Edema/Girth  6. Decreased [place] with Home Exercise Program INTERVENTIONS PLANNED:  1. Cryotherapy  2. Electrical Stimulation  3. Home Exercise Program (HEP)  4. Manual Therapy  5. Range of Motion (ROM)  6. Therapeutic Activites  7. Therapeutic Exercise/Strengthening    TREATMENT PLAN:  Effective Dates: 11/1/2021 TO 12/31/2021 (60 days). Frequency/Duration: 2 times a week for 60 Days  GOALS: (Goals have been discussed and agreed upon with patient.)  Short-Term Functional Goals: Time Frame: 4 weeks:   1. Independent performance of home exercise program  2. Reduce R hand edema to normal levels to allow normal finger to palm fist motions  3. Increase R wrist/hand ROM's to 75% or better of normal  In all planes of motion  Discharge Goals: Time Frame: 8 weeks  1. Pt to demonstrate at least 35#   Strength L/R for normal weight carrying  2. Pt to demonstrate L/R wrist/ hand ROM's 85% or better of normal all planes   3. Improve Quick-DASH scores to 15/55 or better to reflect return to normal ADL function  Rehabilitation Potential For Stated Goals: Good  Regarding Tamera Black's therapy, I certify that the treatment plan above will be carried out by a therapist or under their direction. Thank you for this referral,  Milton Jernigan, PT       Referring Physician Signature: Delisa Goins MD              Date                      HISTORY:   History of Present Injury/Illness (Reason for Referral):  Pt reports a history of having fallen in her garden - she landed on her left wrist, then her right wrist landed on a hoe - she sustained bilateral distal radial fractures in this fall and underwent ORIF of each radius on 10/13/2021.  She comes today following surgical followup to start physical therapy due to problems that include right hand edema, decreased hand/wrist ROM (particularly the right), intermittent R distal forearm pain when attempting to move her wrist or right thumb. Her right thumb mobility is particularly limited with inability at present to oppose her thumb to any of her digits. Past Medical History/Comorbidities:   Ms. Janell Blancas  has no past medical history on file. Ms. Janell Blancas  has no past surgical history on file. Social History/Living Environment:     Pt lives alone but has temporary assistance at home during her convalesence  Prior Level of Function/Work/Activity:  Pt is a  here at [employer]. She enjoys gardening and walking. She has a preceding history of left wrist fracture 19 years prior. Dominant Side:         RIGHT  Current Medications: Includes Cephalexin, Carvediloc, amlodipine, lisinopril, aleve   Date Last Reviewed:  11/1/2021   # of Personal Factors/Comorbidities that affect the Plan of Care: 1-2: MODERATE COMPLEXITY   EXAMINATION:   Observation/Orthostatic Postural Assessment:    Pt is wearing bilateral wrist splints. She declined to fully removing the right wrist splint for examination due to her surgical incisions having been redresseed earlier this afternoon. Left distal forearm has a 9cm long incision closed with steristrips, without drainage. The right digits are moderately edematous. Palpation:    Diffuse right digital and distal hand (palmar /dorsal )pitting edema noted  ROM:    Left : Pt demonstrates ability to perform full left hook, open and full fists. AROM's : wrist flexion = 40 deg, extension = 45 deg, pronation/supination 75% of full AROM, Ulnar deviation = 18 deg, radial deviation = 20 deg. Right: Limited examination due to pt request to not remove splint today: Passive /active digit ROM's: 2-5 =  25 to 50% flexion with full extension at PIP/DIP/MCP's, Thumb IP = full extension, 50% flexion, MCP = full extension, 25% flexion. [facility] deferred due to dressing.  Wrist flexion ~ 15 deg, extension ~ 15 deg, forearm active/passive pronation/supination  = 50 deg to 20 deg short of neutral.  Strength:   Left:  digits all 3+/5, Wrist flex/ext all 3/5  Right: digits all 2+/5, wrist flex/ext 2+/5  Special Tests:    deferred  Neurological Screen:  Grossly intact L/R hand light touch   Functional Mobility:   Performs sit-stand transfers and walks independently  Balance:    Intact normal base standing balance. Body Structures Involved:  1. Nerves  2. Bones  3. Joints  4. Muscles  5. Ligaments Body Functions Affected:  1. Sensory/Pain  2. Neuromusculoskeletal  3. Movement Related Activities and Participation Affected:  1. General Tasks and Demands  2. Mobility  3. Self Care  4. Domestic Life  5. Interpersonal Interactions and Relationships  6. Community, Social and [place]   # of elements that affect the Plan of Care: 3: MODERATE COMPLEXITY   CLINICAL PRESENTATION:   Presentation: Evolving clinical presentation with changing clinical characteristics: MODERATE COMPLEXITY   CLINICAL DECISION MAKING:   Tool Used: Disabilities of the Arm, Shoulder and Hand (DASH) Questionnaire - Quick Version  Score:  Initial: 45/55  Most Recent: X/55 (Date: -- )   Interpretation of Score: The DASH is designed to measure the activities of daily living in person's with upper extremity dysfunction or pain. Each section is scored on a 1-5 scale, 5 representing the greatest disability. The scores of each section are added together for a total score of 55. Medical Necessity:   · Patient is expected to demonstrate progress in strength, range of motion, balance and coordination  ·  to increase independence with all activities requiring use of hands for /grasp/articulation and carrying and restore normal forearm/wrist/hand function. · .  Reason for Services/Other Comments:  · Patient has demonstrated an improvement in functional level by independent performance of HEP.    · .   Use of outcome tool(s) and clinical judgement create a POC that gives a: Questionable prediction of patient's progress: MODERATE COMPLEXITY     Total Treatment Duration:53 minutes  PT Patient Time In/Time Out  Time In: 1617  Time Out: 835 Carola Rivas, PT

## 2021-11-03 ENCOUNTER — HOSPITAL ENCOUNTER (OUTPATIENT)
Dept: PHYSICAL THERAPY | Age: 81
Discharge: HOME | End: 2021-11-03
Payer: COMMERCIAL

## 2021-11-03 PROCEDURE — 97110 THERAPEUTIC EXERCISES: CPT

## 2021-11-03 PROCEDURE — 97140 MANUAL THERAPY 1/> REGIONS: CPT

## 2021-11-03 NOTE — PROGRESS NOTES
Nora Black  : 1940  Primary: [facility] hermila [facility]*  Secondary:  [address] [facility] @ [address], [address] [place] [address]  [phone]   [phone]      OUTPATIENT PHYSICAL THERAPY: Daily Treatment Note  11/3/2021    ICD-10: Treatment Diagnosis: Pain in right wrist (M25.531), Stiffness of right wrist, not elsewhere classified (M25.631), Pain in left wrist (M25.532), Stiffness of left wrist, not elsewhere classified (M25.632), Stiffness of right hand, not elsewhere classified (M25.641) and Stiffness of left hand, not elsewhere classified (M25.642)  MEDICAL/REFERRING DIAGNOSIS:  Status post bilateral distal radius fractures   TREATMENT PLAN:  Effective Dates: 2021 TO 2021 (60 days). Frequency/Duration: 2 times a week for 60 Days  GOALS: (Goals have been discussed and agreed upon with patient.)  Short-Term Functional Goals: Time Frame: 4 weeks:   1. Independent performance of home exercise program  2. Reduce R hand edema to normal levels to allow normal finger to palm fist motions  3. Increase R wrist/hand ROM's to 75% or better of normal  In all planes of motion  Discharge Goals: Time Frame: 8 weeks  1. Pt to demonstrate at least 35#   Strength L/R for normal weight carrying  2. Pt to demonstrate L/R wrist/ hand ROM's 85% or better of normal all planes   3. Improve Quick-DASH scores to 15/55 or better to reflect return to normal ADL function  Rehabilitation Potential For Stated Goals: Good  _________________________________________________________________________  Pre-treatment Symptoms/Complaints:  Pt continuing to work on hand exercises. She's spent more time each day elevating her arm to help decrease swelling  Pain: Initial: 4-5/10 Post Session:  No increase/10   Medications Last Reviewed:  11/3/2021  Updated Objective Findings: . Observation/Orthostatic Postural Assessment:    Pt is wearing bilateral wrist splints.  She declined to fully removing the right wrist splint for examination due to her surgical incisions having been redresseed earlier this afternoon. Left distal forearm has a 9cm long incision closed with steristrips, without drainage. The right digits are moderately edematous. Palpation:    Diffuse right digital and distal hand (palmar /dorsal )pitting edema noted  ROM:    Left : Pt demonstrates ability to perform full left hook, open and full fists. AROM's : wrist flexion = 40 deg, extension = 45 deg, pronation/supination 75% of full AROM, Ulnar deviation = 18 deg, radial deviation = 20 deg. Right: Limited examination due to pt request to not remove splint today: Passive /active digit ROM's: 2-5 =  25 to 50% flexion with full extension at PIP/DIP/MCP's, Thumb IP = full extension, 50% flexion, MCP = full extension, 50% flexion. CMC: 25% flexion Wrist flexion ~ 15 deg, extension ~ 30 deg, forearm active/passive pronation/supination  = 50 deg to neutral.  Strength:   Left:  digits all 3+/5, Wrist flex/ext all 3/5  Right: digits all 2+/5, wrist flex/ext 2+/5  Special Tests:    deferred  Neurological Screen:  Grossly intact L/R hand light touch   Functional Mobility:   Performs sit-stand transfers and walks independently  Balance:    Intact normal base standing balance. TREATMENT:   THERAPEUTIC ACTIVITY: ( see below for minutes): Therapeutic activities per grid below to improve mobility, strength, balance and coordination. Required minimal visual, verbal, manual and tactile cues to improve independence and safety with daily activities . THERAPEUTIC EXERCISE: (see below for minutes):  Exercises per grid below to improve mobility, strength, balance and coordination. Required minimal verbal and manual cues to promote proper body alignment, promote proper body posture and promote proper body mechanics. Progressed resistance, range, repetitions and complexity of movement as indicated.   MANUAL THERAPY: (see below for minutes): Joint mobilization and Soft tissue mobilization was utilized and necessary because of the patient's restricted joint motion, painful spasm, loss of articular motion and restricted motion of soft tissue. MODALITIES: (see below for minutes):      to decrease pain    SELF CARE: (see below for minutes): Procedure(s) (per grid) utilized to improve and/or restore self-care/home management as related to dressing, bathing and grooming. Required minimal verbal cueing to facilitate activities of daily living skills and compensatory activities. Date: 11/1/2021 (visit 1) 11/3/2021 (visit 2)      Modalities:                                Therapeutic Exercise: 18 min 30 min       R digital 1, 2/5 PROM's: 10 min R: all digit PROM's fleixon/extension, 10 min       R wrist flex/ext aarom: 2 min R wrist flexion/extn, radial and uln dev PROm's: 5 min       R forearm aarom pron/sup: 1 min R forearm pron/sup PROM's: 3 min       HEP instruction ( see notes below) - all exercises practiced: 5 min Gripping w/ towel roll x 10        R Finger /thumb abduction AROM x 20        R Hook fist AROM x 20        Finger to thumb opposition aarom/arom x 5 each R hand        Dressing change: replaced dressings at wrist pins                               Proprioceptive Activities:                                Manual Therapy: 10 min 12 min       Edema massage/milking techniques - manual and index finger elastic wrapping: 10 min Edema massage/milking techniques: all digits to palm              Therapeutic Activities:                                  HEP:Access Code: Nuria Shah  URL: [email or web address]  Date: 11/01/2021  Prepared by: Patrica Barba    Exercises  Seated Wrist Supination Stretch - 2 x daily - 5 sets - 30 second hold  Seated Wrist Flexor Hook Fist Tendon Gliding - 2 x daily - 2 sets - 10 reps - 5 hold  Passive Hook Fist - 2 x daily - 5 reps - 30 second hold  Thumb DIP PROM - 2 x daily - 2 sets - 10 reps - 5 second hold  Thumb PROM Composite Flexion - 2 x daily - 2 sets - 10 reps - 5 second hold  Add (11/3/2021): finger abductions x 20: 2x/day. [employer] Portal  Treatment/Session Summary:    · Response to Treatment:  Pain limited supination and wrist extension, Limited 5th to thumb opposition arom (~ 25% of normal)  · Communication/Consultation:  None today  · Equipment provided today:  None today  · Recommendations/Intent for next treatment session: Next visit will focus on manual therapy techniques and positioning to decrease R hand edema, P-AA-AROM L/R forearm/wrist/hand, modalities as needed to modulate pain/swelling, dressing change and elastic sleeve for swelling control.   · .    Total Treatment Billable Duration:  45 min  PT Patient Time In/Time Out  Time In: 9820  Time Out: 4645 Veena Rogers, HARLEY    Future Appointments   Date Time Provider Kamille Reis   11/8/2021  1:00 PM Pete Palomares [facility]   11/15/2021  4:15 PM Briana Tanner, PT [facility]   11/17/2021  4:15 PM Briana Tanner, PT [facility]   11/23/2021  2:00 PM Concha Barragan, PT [facility]

## 2021-11-08 ENCOUNTER — HOSPITAL ENCOUNTER (OUTPATIENT)
Dept: PHYSICAL THERAPY | Age: 81
Discharge: HOME | End: 2021-11-08
Payer: COMMERCIAL

## 2021-11-08 PROBLEM — Z91.81 AT HIGH RISK FOR FALLS: Status: ACTIVE | Noted: 2021-11-08

## 2021-11-08 PROBLEM — I15.8 OTHER SECONDARY HYPERTENSION: Status: ACTIVE | Noted: 2021-11-08

## 2021-11-08 PROBLEM — F41.1 GENERALIZED ANXIETY DISORDER: Status: ACTIVE | Noted: 2021-11-08

## 2021-11-08 PROCEDURE — 97110 THERAPEUTIC EXERCISES: CPT

## 2021-11-08 PROCEDURE — 97140 MANUAL THERAPY 1/> REGIONS: CPT

## 2021-11-08 NOTE — PROGRESS NOTES
Manjinder Black  : 1940  Primary: [facility]*  Secondary:  [address] @ [place] [address].  [phone]   [phone]      OUTPATIENT PHYSICAL THERAPY: Daily Treatment Note  2021    ICD-10: Treatment Diagnosis: Pain in right wrist (M25.531), Stiffness of right wrist, not elsewhere classified (M25.631), Pain in left wrist (M25.532), Stiffness of left wrist, not elsewhere classified (M25.632), Stiffness of right hand, not elsewhere classified (M25.641) and Stiffness of left hand, not elsewhere classified (M25.642)  MEDICAL/REFERRING DIAGNOSIS:  Status post bilateral distal radius fractures   TREATMENT PLAN:  Effective Dates: 2021 TO 2021 (60 days). Frequency/Duration: 2 times a week for 60 Days  GOALS: (Goals have been discussed and agreed upon with patient.)  Short-Term Functional Goals: Time Frame: 4 weeks:   1. Independent performance of home exercise program  2. Reduce R hand edema to normal levels to allow normal finger to palm fist motions  3. Increase R wrist/hand ROM's to 75% or better of normal  In all planes of motion  Discharge Goals: Time Frame: 8 weeks  1. Pt to demonstrate at least 35#   Strength L/R for normal weight carrying  2. Pt to demonstrate L/R wrist/ hand ROM's 85% or better of normal all planes   3. Improve Quick-DASH scores to 15/55 or better to reflect return to normal ADL function  Rehabilitation Potential For Stated Goals: Good  _________________________________________________________________________  Pre-treatment Symptoms/Complaints:  Pt has her home helper assiting her with finger exercises. Reports thumb pain that feels muscular when flexing her thumb. Pain: Initial: 4-5/10 Post Session:  No increase/10   Medications Last Reviewed:  2021  Updated Objective Findings: .    Observation/Orthostatic Postural Assessment:    Pt is wearing bilateral wrist splints. She declined to fully removing the right wrist splint for examination due to her surgical incisions having been redresseed earlier this afternoon. Left distal forearm has a 9cm long incision closed with steristrips, without drainage. The right digits are moderately edematous. Palpation:    Diffuse right digital and distal hand (palmar /dorsal )pitting edema noted  ROM:    Left : Pt demonstrates ability to perform full left hook, open and full fists. AROM's : wrist flexion = 40 deg, extension = 45 deg, pronation/supination 75% of full AROM, Ulnar deviation = 18 deg, radial deviation = 20 deg. Right: Passive Camryn Rubins digit ROM's: 2-5 =  50% flexion with full extension at PIP/DIP/MCP's, Thumb IP = full extension, 50% flexion, MCP = full extension, 50% flexion. CMC: 25% flexion Wrist flexion ~ 15 deg, extension active/passive,~ 30 deg, forearm active/passive. pronation/supination  = 50 deg to neutral.  Strength:   Left:  digits all 3+/5, Wrist flex/ext all 3/5  Right: digits all 2+/5, wrist flex/ext 2+/5  Special Tests:    deferred  Neurological Screen:  Grossly intact L/R hand light touch   Functional Mobility:   Performs sit-stand transfers and walks independently  Balance:    Intact normal base standing balance. TREATMENT:   THERAPEUTIC ACTIVITY: ( see below for minutes): Therapeutic activities per grid below to improve mobility, strength, balance and coordination. Required minimal visual, verbal, manual and tactile cues to improve independence and safety with daily activities . THERAPEUTIC EXERCISE: (see below for minutes):  Exercises per grid below to improve mobility, strength, balance and coordination. Required minimal verbal and manual cues to promote proper body alignment, promote proper body posture and promote proper body mechanics. Progressed resistance, range, repetitions and complexity of movement as indicated.   MANUAL THERAPY: (see below for minutes): Joint mobilization and Soft tissue mobilization was utilized and necessary because of the patient's restricted joint motion, painful spasm, loss of articular motion and restricted motion of soft tissue. MODALITIES: (see below for minutes):      to decrease pain    SELF CARE: (see below for minutes): Procedure(s) (per grid) utilized to improve and/or restore self-care/home management as related to dressing, bathing and grooming. Required minimal verbal cueing to facilitate activities of daily living skills and compensatory activities. Date: 11/1/2021 (visit 1) 11/3/2021 (visit 2) 11/8/2021 (visit 3)     Modalities:                                Therapeutic Exercise: 18 min 30 min 34 min      R digital 1, 2/5 PROM's: 10 min R: all digit PROM's fleixon/extension, 10 min R wrist flexion/extn, radial and uln dev PROM: 5 min      R wrist flex/ext aarom: 2 min R wrist flexion/extn, radial and uln dev PROm's: 5 min R forearm pron/sup PROM: 3 min      R forearm aarom pron/sup: 1 min R forearm pron/sup PROM's: 3 min R thumb, fingers 2-5 flexion/extension PROM's x 5 min      HEP instruction ( see notes below) - all exercises practiced: 5 min Gripping w/ towel roll x 10 Active R pronation supination x 10       R Finger /thumb abduction AROM x 20 Active wrist flexion /extension x 10       R Hook fist AROM x 20 Finger to thumb opposition aarom/arom x 5 each R hand       Finger to thumb opposition aarom/arom x 5 each R hand Gripping: rolled towel x 20        Dressing change: replaced dressings at wrist pins  Dressing change: replaced dressings at wrist pins         Finger abductions x 20        Index to thumb marble pick ups x 30 R.               Proprioceptive Activities:                                Manual Therapy: 10 min 12 min 10 min      Edema massage/milking techniques - manual and index finger elastic wrapping: 10 min Edema massage/milking techniques: all digits to palm Edema massage/milking techniques: all digits to palm        R thumb MCP distraction gr. 3: 1 min     Therapeutic Activities:                                  HEP:    Exercises  Seated Wrist Supination Stretch - 2 x daily - 5 sets - 30 second hold  Seated Wrist Flexor Hook Fist Tendon Gliding - 2 x daily - 2 sets - 10 reps - 5 hold  Passive Hook Fist - 2 x daily - 5 reps - 30 second hold  Thumb DIP PROM - 2 x daily - 2 sets - 10 reps - 5 second hold  Thumb PROM Composite Flexion - 2 x daily - 2 sets - 10 reps - 5 second hold  Add (11/3/2021): finger abductions x 20: 2x/day. Add (11/8/2021): active wrist flexion /extension x 10, pronation/supination x 10. Advised pt to perform daily dressing change/wash incision sites w/ soap/water. [facility] Portal  Treatment/Session Summary:    · Response to Treatment:  Wrist extension and forearm supination w/ very stiff end feels - may be due to wrist pin fixation. · Communication/Consultation:  None today  · Equipment provided today:  None today  · Recommendations/Intent for next treatment session: Next visit will focus on manual therapy techniques and positioning to decrease R hand edema, P-AA-AROM L/R forearm/wrist/hand, modalities as needed to modulate pain/swelling, dressing change and elastic sleeve for swelling control.   · .    Total Treatment Billable Duration:  44 min  PT Patient Time In/Time Out  Time In: 1100  Time Out: 4750 [facility],     Future Appointments   Date Time Provider Kamille Reis   11/8/2021  3:10 PM Bhumika Verdugo NP [facility]   11/10/2021 10:15 AM Jacklyn Cross, PT [facility]   11/15/2021  4:15 PM Jacklyn Cross, PT [facility]   11/17/2021  4:15 PM Jacklyn Cross, PT [facility]   11/23/2021  2:00 PM Jacklyn Cross, PT [facility]   11/30/2021  2:45 PM Beatriz Barragan, PT [facility]

## 2021-11-10 ENCOUNTER — HOSPITAL ENCOUNTER (OUTPATIENT)
Dept: PHYSICAL THERAPY | Age: 81
Discharge: HOME | End: 2021-11-10
Payer: COMMERCIAL

## 2021-11-10 PROCEDURE — 97140 MANUAL THERAPY 1/> REGIONS: CPT

## 2021-11-10 PROCEDURE — 97110 THERAPEUTIC EXERCISES: CPT

## 2021-11-10 NOTE — PROGRESS NOTES
Dayana Black  : 1940  Primary: [facility] [employer]  Secondary:  [address] @ [address]  [phone]   [phone]      OUTPATIENT PHYSICAL THERAPY: Daily Treatment Note  11/10/2021    ICD-10: Treatment Diagnosis: Pain in right wrist (M25.531), Stiffness of right wrist, not elsewhere classified (M25.631), Pain in left wrist (M25.532), Stiffness of left wrist, not elsewhere classified (M25.632), Stiffness of right hand, not elsewhere classified (M25.641) and Stiffness of left hand, not elsewhere classified (M25.642)  MEDICAL/REFERRING DIAGNOSIS:  Status post bilateral distal radius fractures   TREATMENT PLAN:  Effective Dates: 2021 TO 2021 (60 days). Frequency/Duration: 2 times a week for 60 Days  GOALS: (Goals have been discussed and agreed upon with patient.)  Short-Term Functional Goals: Time Frame: 4 weeks:   1. Independent performance of home exercise program  2. Reduce R hand edema to normal levels to allow normal finger to palm fist motions  3. Increase R wrist/hand ROM's to 75% or better of normal  In all planes of motion  Discharge Goals: Time Frame: 8 weeks  1. Pt to demonstrate at least 35#   Strength L/R for normal weight carrying  2. Pt to demonstrate L/R wrist/ hand ROM's 85% or better of normal all planes   3. Improve Quick-DASH scores to 15/55 or better to reflect return to normal ADL function  Rehabilitation Potential For Stated Goals: Good  _________________________________________________________________________  Pre-treatment Symptoms/Complaints:  Pt and home helper have questions about dressing changes. Pt reporting only intermittent pain, 4-5/10. She continues to perform regular finger flexion and finger spreading AROM. Pain: Initial: 4-5/10 Post Session:  No increase/10   Medications Last Reviewed:  11/10/2021  Updated Objective Findings: .    Observation/Orthostatic Postural Assessment:    Pt is wearing bilateral wrist splints. Mild drainage from ulnar wrist pin exit site. Palpation:    Diffuse right digital and distal hand (palmar /dorsal )pitting edema noted  ROM:    Left : Pt demonstrates ability to perform full left hook, open and full fists. AROM's : wrist flexion = 40 deg, extension = 45 deg, pronation/supination 75% of full AROM, Ulnar deviation = 18 deg, radial deviation = 20 deg. Right: Passive Edgardo Marisol digit ROM's: 2-5 =  50% flexion with full extension at PIP/DIP/MCP's, Thumb IP = full extension, 50% flexion, MCP = full extension, 50% flexion. CMC: 25% flexion. 11/10/2021:  Wrist flexion P/AROM = 42 deg, extension A/PROM = 20  Deg, Ulnar deviation = 22 deg PROM, radial deviation = to 0 deg. R forearm active/passive. pronation/supination  = 50 deg to neutral.    Strength:   Left:  digits all 3+/5, Wrist flex/ext all 3/5  Right: digits all 2+/5, wrist flex/ext 2+/5  Special Tests:    deferred  Neurological Screen:  Grossly intact L/R hand light touch   Functional Mobility:   Performs sit-stand transfers and walks independently  Balance:    Intact normal base standing balance. TREATMENT:   THERAPEUTIC ACTIVITY: ( see below for minutes): Therapeutic activities per grid below to improve mobility, strength, balance and coordination. Required minimal visual, verbal, manual and tactile cues to improve independence and safety with daily activities . THERAPEUTIC EXERCISE: (see below for minutes):  Exercises per grid below to improve mobility, strength, balance and coordination. Required minimal verbal and manual cues to promote proper body alignment, promote proper body posture and promote proper body mechanics. Progressed resistance, range, repetitions and complexity of movement as indicated.   MANUAL THERAPY: (see below for minutes): Joint mobilization and Soft tissue mobilization was utilized and necessary because of the patient's restricted joint motion, painful spasm, loss of articular motion and restricted motion of soft tissue. MODALITIES: (see below for minutes):      to decrease pain    SELF CARE: (see below for minutes): Procedure(s) (per grid) utilized to improve and/or restore self-care/home management as related to dressing, bathing and grooming. Required minimal verbal cueing to facilitate activities of daily living skills and compensatory activities. Date: 11/1/2021 (visit 1) 11/3/2021 (visit 2) 11/8/2021 (visit 3) 11/10/2021 (visit 4)    Modalities:                                Therapeutic Exercise: 18 min 30 min 34 min 41 min     R digital 1, 2/5 PROM's: 10 min R: all digit PROM's fleixon/extension, 10 min R wrist flexion/extn, radial and uln dev PROM: 5 min R wrist flexion/extn, radial and uln dev PROM: 8 min     R wrist flex/ext aarom: 2 min R wrist flexion/extn, radial and uln dev PROm's: 5 min R forearm pron/sup PROM: 3 min R forearm pron/sup PROM: 2 min     R forearm aarom pron/sup: 1 min R forearm pron/sup PROM's: 3 min R thumb, fingers 2-5 flexion/extension PROM's x 5 min R thumb, fingers 2-5 flexion/extension PROM's x 5 min     HEP instruction ( see notes below) - all exercises practiced: 5 min Gripping w/ towel roll x 10 Active R pronation supination x 10 R finger manl resist tendon glides (hook, open, full fist): 10x each      R Finger /thumb abduction AROM x 20 Active wrist flexion /extension x 10 Active wrist flex/ext: x 10      R Hook fist AROM x 20 Finger to thumb opposition aarom/arom x 5 each R hand Thumb IP and CMC manl. Resist flexion: 10x each      Finger to thumb opposition aarom/arom x 5 each R hand Gripping: rolled towel x 20  Finger abd manl resist: 20x       Dressing change: replaced dressings at wrist pins  Dressing change: replaced dressings at wrist pins  [place] pickups x 10       Finger abductions x 20 Dressing change: replaced dressings at wrist pins. Index to thumb marble pick ups x 30 R. Proprioceptive Activities:                                Manual Therapy: 10 min 12 min 10 min 12 min     Edema massage/milking techniques - manual and index finger elastic wrapping: 10 min Edema massage/milking techniques: all digits to palm Edema massage/milking techniques: all digits to palm Edema massage/milking techniques: all digits to palm 6 min        Thenar eminience stm : 3 min       R thumb MCP distraction gr. 3: 1 min Joint mobs :gr. 2- 3 wrist distraction, intercarpal glides gr. 2: 3 min     Therapeutic Activities:                                  HEP:    Exercises  Seated Wrist Supination Stretch - 2 x daily - 5 sets - 30 second hold  Seated Wrist Flexor Hook Fist Tendon Gliding - 2 x daily - 2 sets - 10 reps - 5 hold  Passive Hook Fist - 2 x daily - 5 reps - 30 second hold  Thumb DIP PROM - 2 x daily - 2 sets - 10 reps - 5 second hold  Thumb PROM Composite Flexion - 2 x daily - 2 sets - 10 reps - 5 second hold  Add (11/3/2021): finger abductions x 20: 2x/day. Add (11/8/2021): active wrist flexion /extension x 10, pronation/supination x 10. Advised pt to perform daily dressing change/wash incision sites w/ soap/water. 11/10/2021: sent pt home w/strip of 3\" coban for wrapping palm to limit edema - use this when forearm is out of brace. [facility] Portal  Treatment/Session Summary:    · Response to Treatment:  ROM's slowly improving at wrist. Weak wrist extension. Palm and digital edema hindering Finger flexion ROM gains- attempted to use elastic palm sleeve as well as coban wrap at palm, but unable to to use elastic (too snug) and coban would not fit inside of brace. Pt instructed in how to wrap palm w/assist to wear coban when out of brace during day. Incision/wounds continue to heal, less drainage at ulnar pin site and no drainage at radial pin site.   · Communication/Consultation:  None today  · Equipment provided today:  coban wrap: 12\" x 3\" for palm edema control  · Recommendations/Intent for next treatment session: Next visit will focus on manual therapy techniques and positioning to decrease R hand edema, P-AA-AROM L/R forearm/wrist/hand, modalities as needed to modulate pain/swelling, dressing change and elastic sleeve for swelling control.   · .    Total Treatment Billable Duration:  53 min  PT Patient Time In/Time Out  Time In: 1010  Time Out: [address], PT    Future Appointments   Date Time Provider Kamille Simmonsi   11/15/2021  4:15 PM Briana Tanner, PT [facility]   11/17/2021  4:15 PM Concha Carbajal, [facility]   11/23/2021  2:00 PM Briana Tanner, PT [facility]   11/30/2021  2:45 PM Briana Tanner, PT [facility]   12/6/2021  1:30 PM Marielle Kathleen, PA [facility]

## 2021-11-11 ENCOUNTER — APPOINTMENT (OUTPATIENT)
Dept: PHYSICAL THERAPY | Age: 81
End: 2021-11-11
Payer: COMMERCIAL

## 2021-11-15 ENCOUNTER — HOSPITAL ENCOUNTER (OUTPATIENT)
Dept: PHYSICAL THERAPY | Age: 81
Discharge: HOME | End: 2021-11-15
Payer: COMMERCIAL

## 2021-11-15 PROCEDURE — 97140 MANUAL THERAPY 1/> REGIONS: CPT

## 2021-11-15 PROCEDURE — 97110 THERAPEUTIC EXERCISES: CPT

## 2021-11-15 NOTE — PROGRESS NOTES
Aster Black  : 1940  Primary: [facility]  Secondary:  Matt Bernal @ [address]  [address] [place] [address].  [phone]   [phone]      OUTPATIENT PHYSICAL THERAPY: Daily Treatment Note  11/15/2021    ICD-10: Treatment Diagnosis: Pain in right wrist (M25.531), Stiffness of right wrist, not elsewhere classified (M25.631), Pain in left wrist (M25.532), Stiffness of left wrist, not elsewhere classified (M25.632), Stiffness of right hand, not elsewhere classified (M25.641) and Stiffness of left hand, not elsewhere classified (M25.642)  MEDICAL/REFERRING DIAGNOSIS:  Status post bilateral distal radius fractures   TREATMENT PLAN:  Effective Dates: 2021 TO 2021 (60 days). Frequency/Duration: 2 times a week for 60 Days  GOALS: (Goals have been discussed and agreed upon with patient.)  Short-Term Functional Goals: Time Frame: 4 weeks:   1. Independent performance of home exercise program  2. Reduce R hand edema to normal levels to allow normal finger to palm fist motions  3. Increase R wrist/hand ROM's to 75% or better of normal  In all planes of motion  Discharge Goals: Time Frame: 8 weeks  1. Pt to demonstrate at least 35#   Strength L/R for normal weight carrying  2. Pt to demonstrate L/R wrist/ hand ROM's 85% or better of normal all planes   3. Improve Quick-DASH scores to 15/55 or better to reflect return to normal ADL function  Rehabilitation Potential For Stated Goals: Good  _________________________________________________________________________  Pre-treatment Symptoms/Complaints:  Pt continuing ot work on her home execises and getting help from her home helper with getting her hand massaged out. R fingers seem lke they're flexing less than last week.  Feels like she needs to start working on her left wrist as well  Pain: Initial: 4-5/10 Post Session:  No increase/10   Medications Last Reviewed: 11/15/2021  Updated Objective Findings: . Observation/Orthostatic Postural Assessment:    Pt is wearing bilateral wrist splints. Palpation:    Diffuse right digital and distal hand (palmar /dorsal )pitting edema noted  ROM:    Left : Pt demonstrates ability to perform full left hook, open and full fists. AROM's : wrist flexion = 40 deg, extension = 45 deg, pronation/supination 75% of full AROM, Ulnar deviation = 18 deg, radial deviation = 20 deg. Right: Passive Carmita Mews digit ROM's: 2-5 =  50% flexion with full extension at PIP/DIP/MCP's, Thumb IP = full extension, 50% flexion, MCP = full extension, 50% flexion. CMC: 25% flexion. 11/10/2021:  Wrist flexion P/AROM = 42 deg, extension A/PROM = 20  Deg, Ulnar deviation = 22 deg PROM, radial deviation = to 0 deg. R forearm active/passive. pronation/supination  = ~ 60 deg to ~ 40 deg. Strength:   Left:  digits all 3+/5, Wrist flex/ext all 3/5  Right: digits all 2+/5, wrist flex/ext 2+/5  Special Tests:    deferred  Neurological Screen:  Grossly intact L/R hand light touch   Functional Mobility:   Performs sit-stand transfers and walks independently  Balance:    Intact normal base standing balance. TREATMENT:   THERAPEUTIC ACTIVITY: ( see below for minutes): Therapeutic activities per grid below to improve mobility, strength, balance and coordination. Required minimal visual, verbal, manual and tactile cues to improve independence and safety with daily activities . THERAPEUTIC EXERCISE: (see below for minutes):  Exercises per grid below to improve mobility, strength, balance and coordination. Required minimal verbal and manual cues to promote proper body alignment, promote proper body posture and promote proper body mechanics. Progressed resistance, range, repetitions and complexity of movement as indicated.   MANUAL THERAPY: (see below for minutes): Joint mobilization and Soft tissue mobilization was utilized and necessary because of the patient's restricted joint motion, painful spasm, loss of articular motion and restricted motion of soft tissue. MODALITIES: (see below for minutes):      to decrease pain    SELF CARE: (see below for minutes): Procedure(s) (per grid) utilized to improve and/or restore self-care/home management as related to dressing, bathing and grooming. Required minimal verbal cueing to facilitate activities of daily living skills and compensatory activities. Date: 11/1/2021 (visit 1) 11/3/2021 (visit 2) 11/8/2021 (visit 3) 11/10/2021 (visit 4) 11/15/2021 (visit 5)   Modalities:                                Therapeutic Exercise: 18 min 30 min 34 min 41 min 32 min    R digital 1, 2/5 PROM's: 10 min R: all digit PROM's fleixon/extension, 10 min R wrist flexion/extn, radial and uln dev PROM: 5 min R wrist flexion/extn, radial and uln dev PROM: 8 min R wrist flexion/extn, radial and uln dev PROM: 5 min    R wrist flex/ext aarom: 2 min R wrist flexion/extn, radial and uln dev PROm's: 5 min R forearm pron/sup PROM: 3 min R forearm pron/sup PROM: 2 min R forearm pron/sup PROM: 2 min    R forearm aarom pron/sup: 1 min R forearm pron/sup PROM's: 3 min R thumb, fingers 2-5 flexion/extension PROM's x 5 min R thumb, fingers 2-5 flexion/extension PROM's x 5 min R thumb, fingers 2-5 flexion/extension PROM's x 7 min: individual  And composite fist    HEP instruction ( see notes below) - all exercises practiced: 5 min Gripping w/ towel roll x 10 Active R pronation supination x 10 R finger manl resist tendon glides (hook, open, full fist): 10x each R finger manl resist: full fist, intrinsic abduction, 10x     R Finger /thumb abduction AROM x 20 Active wrist flexion /extension x 10 Active wrist flex/ext: x 10 Active wrist flex/ext: x 10     R Hook fist AROM x 20 Finger to thumb opposition aarom/arom x 5 each R hand Thumb IP and CMC manl. Resist flexion: 10x each Thumb IP and CMC manl.  Resist flexion: 10x each     Finger to thumb opposition aarom/arom x 5 each R hand Gripping: rolled towel x 20  Finger abd manl resist: 20x  Ball squeeze gripping: 2 x 10     Dressing change: replaced dressings at wrist pins  Dressing change: replaced dressings at wrist pins  [place] pickups x 10 Assisted 1st to thumb, 2nd to thumb opposition: 10x each. Finger abductions x 20 Dressing change: replaced dressings at wrist pins. Index to thumb marble pick ups x 30 R. Proprioceptive Activities:                                Manual Therapy: 10 min 12 min 10 min 12 min 14 min    Edema massage/milking techniques - manual and index finger elastic wrapping: 10 min Edema massage/milking techniques: all digits to palm Edema massage/milking techniques: all digits to palm Edema massage/milking techniques: all digits to palm 6 min Edema massage/milking techniques: all digits to palm 8 min       Thenar eminience stm : 3 min Thenar eminience stm : 3 min      R thumb MCP distraction gr. 3: 1 min Joint mobs :gr. 2- 3 wrist distraction, intercarpal glides gr. 2: 3 min  Joint mobs :gr. 2- 3 wrist distraction, intercarpal glides gr. 2: 3 min    Therapeutic Activities:                                  HEP:    Exercises  Seated Wrist Supination Stretch - 2 x daily - 5 sets - 30 second hold  Seated Wrist Flexor Hook Fist Tendon Gliding - 2 x daily - 2 sets - 10 reps - 5 hold  Passive Hook Fist - 2 x daily - 5 reps - 30 second hold  Thumb DIP PROM - 2 x daily - 2 sets - 10 reps - 5 second hold  Thumb PROM Composite Flexion - 2 x daily - 2 sets - 10 reps - 5 second hold  Add (11/3/2021): finger abductions x 20: 2x/day. Add (11/8/2021): active wrist flexion /extension x 10, pronation/supination x 10. Advised pt to perform daily dressing change/wash incision sites w/ soap/water. 11/10/2021: sent pt home w/strip of 3\" coban for wrapping palm to limit edema - use this when forearm is out of brace.        [employer] Portal  Treatment/Session Summary:    · Response to Treatment:  Despite base of thumb pain and flnger flexor weakness, overall forearm and individual digit ROMs' increasing- wrist extension is the most limited, ~ 15 deg, likely due to wrist pin; anticipate that her ROM will improve readily once this pin is removed. · Communication/Consultation:  None today  · Equipment provided today:  None today  · Recommendations/Intent for next treatment session: Next visit will focus on manual therapy techniques and positioning to decrease R hand edema, P-AA-AROM L/R forearm/wrist/hand, modalities as needed to modulate pain/swelling, dressing change as needed.   · .    Total Treatment Billable Duration: 46  min  PT Patient Time In/Time Out  Time In: 5253  Time Out: [address], PT    Future Appointments   Date Time Provider Kamille Reis   11/17/2021  4:15 PM Shan Salas, PT [facility]   11/23/2021  2:00 PM Shan Salas, PT [facility]   11/30/2021  2:45 PM [facility]   12/6/2021  1:30 PM Hever, [place], PA [facility]

## 2021-11-17 ENCOUNTER — HOSPITAL ENCOUNTER (OUTPATIENT)
Dept: PHYSICAL THERAPY | Age: 81
Discharge: HOME | End: 2021-11-17
Payer: COMMERCIAL

## 2021-11-17 PROCEDURE — 97110 THERAPEUTIC EXERCISES: CPT

## 2021-11-17 PROCEDURE — 97530 THERAPEUTIC ACTIVITIES: CPT

## 2021-11-17 NOTE — PROGRESS NOTES
Deepa Black  : 1940  [place] [address] [address] [place]*  Secondary:  [address] @ [address]  [phone]   [phone]      OUTPATIENT PHYSICAL THERAPY: Daily Treatment Note  2021   ICD-10: Treatment Diagnosis: Pain in right wrist (M25.531), Stiffness of right wrist, not elsewhere classified (M25.631), Pain in left wrist (M25.532), Stiffness of left wrist, not elsewhere classified (M25.632), Stiffness of right hand, not elsewhere classified (M25.641) and Stiffness of left hand, not elsewhere classified (M25.642)  MEDICAL/REFERRING DIAGNOSIS:  Status post bilateral distal radius fractures   TREATMENT PLAN:  Effective Dates: 2021 TO 2021 (60 days). Frequency/Duration: 2 times a week for 60 Days  GOALS: (Goals have been discussed and agreed upon with patient.)  Short-Term Functional Goals: Time Frame: 4 weeks:   1. Independent performance of home exercise program  2. Reduce R hand edema to normal levels to allow normal finger to palm fist motions  3. Increase R wrist/hand ROM's to 75% or better of normal  In all planes of motion  Discharge Goals: Time Frame: 8 weeks  1. Pt to demonstrate at least 35#   Strength L/R for normal weight carrying  2. Pt to demonstrate L/R wrist/ hand ROM's 85% or better of normal all planes   3. Improve Quick-DASH scores to 15/55 or better to reflect return to normal ADL function  Rehabilitation Potential For Stated Goals: Good  _________________________________________________________________________  Pre-treatment Symptoms/Complaints:  Difficulty continues to be flexing right fingers fully - feels that her hand splint has been interfering with this over the last 10 days. Sees her surgeon later this week.    Pain: Initial: 10 Post Session:  No increase/10   Medications Last Reviewed:  2021  Updated Objective Findings:    Observation/Orthostatic Postural Assessment:    Pt is wearing bilateral wrist splints. Palpation:    Diffuse right digital and distal hand (palmar /dorsal )pitting edema noted  ROM:    Left : Pt demonstrates ability to perform full left hook, open and full fists. AROM's : wrist flexion = 40 deg, extension = 45 deg, pronation/supination 75% of full AROM, Ulnar deviation = 18 deg, radial deviation = 20 deg. Right: Passive Marcelyn Sample digit ROM's: 2-5 =  50% flexion with full extension at PIP/DIP/MCP's, Thumb IP = full extension, 50% flexion, MCP = full extension, 50% flexion. CMC: 25% flexion. Wrist flexion P/AROM = 42 deg, extension A/PROM = 22  Deg, Ulnar deviation = 22 deg PROM, radial deviation = to 0 deg. R forearm active/passive. pronation/supination  = ~ 60 deg to ~ 40 deg. Strength:   Left:  digits all 4-/5, Wrist flex/ext all 3+/5  Right: digits all 2+/5, wrist flex/ext 2+/5   TREATMENT:   THERAPEUTIC ACTIVITY: ( see below for minutes): Therapeutic activities per grid below to improve mobility, strength, balance and coordination. Required minimal visual, verbal, manual and tactile cues to improve independence and safety with daily activities . THERAPEUTIC EXERCISE: (see below for minutes):  Exercises per grid below to improve mobility, strength, balance and coordination. Required minimal verbal and manual cues to promote proper body alignment, promote proper body posture and promote proper body mechanics. Progressed resistance, range, repetitions and complexity of movement as indicated. MANUAL THERAPY: (see below for minutes): Joint mobilization and Soft tissue mobilization was utilized and necessary because of the patient's restricted joint motion, painful spasm, loss of articular motion and restricted motion of soft tissue.    MODALITIES: (see below for minutes):      to decrease pain    SELF CARE: (see below for minutes): Procedure(s) (per grid) utilized to improve and/or restore self-care/home management as related to dressing, bathing and grooming. Required minimal verbal cueing to facilitate activities of daily living skills and compensatory activities. Date: 11/17/2021 (visit 6)       Modalities:                                Therapeutic Exercise: 37 min        R digit (2-5) composite flexion PROM: 8 min        R thumb IP/MCP PROM 2 min        R forearm AA/AROM: pronation / supination w/ end range stretches x 10        R wrist flexion/extension: AAROM x 10 /AROM x 10, end range stretch holds        R finger abd. Spreading- all x 10, individual 10 each        Resisted finger flexion: 3 x 10 manual, DIP flexions at edge of table x 20        R thumb circumduction AROMs cw/ccw 10 each        R hand gripping into ball: 4 min        Left forearm Israel Dwayne PROM's : 4 min                               Proprioceptive Activities:                                Manual Therapy: 8 min        Edema massage R dorsal /palmar hand/thenar eminence        L/R diatal forearm scar massage        Left wrist distraction gr. 3       Therapeutic Activities:                                  HEP: Exercises  Seated Wrist Supination Stretch - 2 x daily - 5 sets - 30 second hold  Seated Wrist Flexor Hook Fist Tendon Gliding - 2 x daily - 2 sets - 10 reps - 5 hold  Passive Hook Fist - 2 x daily - 5 reps - 30 second hold  Thumb DIP PROM - 2 x daily - 2 sets - 10 reps - 5 second hold  Thumb PROM Composite Flexion - 2 x daily - 2 sets - 10 reps - 5 second hold  Add (11/3/2021): finger abductions x 20: 2x/day. Add (11/8/2021): active wrist flexion /extension x 10, pronation/supination x 10. Advised pt to perform daily dressing change/wash incision sites w/ soap/water. 11/10/2021: sent pt home w/strip of 3\" coban for wrapping palm to limit edema - use this when forearm is out of brace. [employer] Portal  Treatment/Session Summary:    · Response to Treatment:  Localized pain issues at R thenar eminence during AROM. Slow increases in wrist ROM.  Finger flexion AROM still limited, in part by digital and palm edema. · Communication/Consultation:  None today  · Equipment provided today:  None today  · Recommendations/Intent for next treatment session: Next visit will focus on P/AA/AROM R and L wrists, scar massage /mobilization, Progressive strength activities.  Edema massage and forearm wrist mobilization    Total Treatment Billable Duration:  45 min  PT Patient Time In/Time Out  Time In: 1622  Time Out: [id number], PT    Future Appointments   Date Time Provider Kamille Reis   11/23/2021  2:00 PM Briana Tanner, PT [facility]   11/30/2021  2:45 PM Briana Tanner, PT [facility]   12/6/2021  1:30 PM Marielle Kathleen PA [facility]

## 2021-11-23 ENCOUNTER — HOSPITAL ENCOUNTER (OUTPATIENT)
Dept: PHYSICAL THERAPY | Age: 81
Discharge: HOME | End: 2021-11-23
Payer: COMMERCIAL

## 2021-11-23 PROCEDURE — 97110 THERAPEUTIC EXERCISES: CPT

## 2021-11-23 PROCEDURE — 97140 MANUAL THERAPY 1/> REGIONS: CPT

## 2021-11-23 NOTE — PROGRESS NOTES
Eleazar Black  : 1940  Primary: [facility]  Secondary:  Ingrid Villalpando @ [address]  [phone]   [phone]      OUTPATIENT PHYSICAL THERAPY: Daily Treatment Note  2021   ICD-10: Treatment Diagnosis: Pain in right wrist (M25.531), Stiffness of right wrist, not elsewhere classified (M25.631), Pain in left wrist (M25.532), Stiffness of left wrist, not elsewhere classified (M25.632), Stiffness of right hand, not elsewhere classified (M25.641) and Stiffness of left hand, not elsewhere classified (M25.642)  MEDICAL/REFERRING DIAGNOSIS:  Status post bilateral distal radius fractures   TREATMENT PLAN:  Effective Dates: 2021 TO 2021 (60 days). Frequency/Duration: 2 times a week for 60 Days  GOALS: (Goals have been discussed and agreed upon with patient.)  Short-Term Functional Goals: Time Frame: 4 weeks:   1. Independent performance of home exercise program  2. Reduce R hand edema to normal levels to allow normal finger to palm fist motions  3. Increase R wrist/hand ROM's to 75% or better of normal  In all planes of motion  Discharge Goals: Time Frame: 8 weeks  1. Pt to demonstrate at least 35#   Strength L/R for normal weight carrying  2. Pt to demonstrate L/R wrist/ hand ROM's 85% or better of normal all planes   3. Improve Quick-DASH scores to 15/55 or better to reflect return to normal ADL function  Rehabilitation Potential For Stated Goals: Good  _________________________________________________________________________  Pre-treatment Symptoms/Complaints:  Was back to her surgeon's office yesterday, wrist pins were removed, pt advised to limit lifting with R hand> still dealing with stiffness/soreness, thumb and wrist pain.    Pain: Initial: 6/10 Post Session:  No increase/10   Medications Last Reviewed:  2021  Updated Objective Findings:    Observation/Orthostatic Postural Assessment:    Pt is wearing bilateral wrist splints. Palpation:    Diffuse right digital and distal hand (palmar /dorsal )pitting edema noted  ROM:    Left : Pt demonstrates ability to perform full left hook, open and full fists. AROM's : wrist flexion = 40 deg, extension = 45 deg, pronation/supination 75% of full AROM, Ulnar deviation = 18 deg, radial deviation = 20 deg. Right: Passive Jeancarlos Ready digit ROM's: 2-5 =  50% flexion with full extension at PIP/DIP/MCP's, Thumb IP = full extension, 50% flexion, MCP = full extension, 50% flexion. CMC: 25% flexion. Wrist flexion P/AROM = 42 deg, extension A/PROM = 22  Deg, Ulnar deviation = 22 deg PROM, radial deviation = to 0 deg. R forearm active/passive. pronation/supination  = ~ 60 deg to ~ 40 deg. Strength:   Left:  digits all 4-/5, Wrist flex/ext all 3+/5  Right: digits all 2+/5, wrist flex/ext 2+/5   TREATMENT:   THERAPEUTIC ACTIVITY: ( see below for minutes): Therapeutic activities per grid below to improve mobility, strength, balance and coordination. Required minimal visual, verbal, manual and tactile cues to improve independence and safety with daily activities . THERAPEUTIC EXERCISE: (see below for minutes):  Exercises per grid below to improve mobility, strength, balance and coordination. Required minimal verbal and manual cues to promote proper body alignment, promote proper body posture and promote proper body mechanics. Progressed resistance, range, repetitions and complexity of movement as indicated. MANUAL THERAPY: (see below for minutes): Joint mobilization and Soft tissue mobilization was utilized and necessary because of the patient's restricted joint motion, painful spasm, loss of articular motion and restricted motion of soft tissue.    MODALITIES: (see below for minutes):      to decrease pain    SELF CARE: (see below for minutes): Procedure(s) (per grid) utilized to improve and/or restore self-care/home management as related to dressing, bathing and grooming. Required minimal verbal cueing to facilitate activities of daily living skills and compensatory activities. Date: 11/17/2021 (visit 6) 11/23/2021 (visit 7)      Modalities:                                Therapeutic Exercise: 37 min 35 min       R digit (2-5) composite flexion PROM: 8 min R digit (2-5) composite flexion PROM       R thumb IP/MCP PROM 2 min R thumb IP/MCP PROM 2 min, CMC abduction, opposition x 2 min       R forearm AA/AROM: pronation / supination w/ end range stretches x 10 R forearm AA/AROM: pronation / supination w/ end range stretches x 10       R wrist flexion/extension: AAROM x 10 /AROM x 10, end range stretch holds R wrist flexion/extension: AAROM x 10 /AROM x 10, end range       R finger abd. Spreading- all x 10, individual 10 each R hand gripping into ball: 4 min       Resisted finger flexion: 3 x 10 manual, DIP flexions at edge of table x 20 Self prayer stretch x 10 reps       R thumb circumduction AROMs cw/ccw 10 each Self R wrist flexion stretch: 5 x 15\"       R hand gripping into ball: 4 min Self R wrist extension stretch 5 x 15\"       Left forearm Chris Crespo PROM's : 4 min HEP revision - see list.                               Proprioceptive Activities:                                Manual Therapy: 8 min 10 min       Edema massage R dorsal /palmar hand/thenar eminence Edema massage R dorsal /palmar hand/thenar eminence       L/R diatal forearm scar massage Joint mobs: wrist R distraction, intercarpal glides gr. 3       Left wrist distraction gr. 3       Therapeutic Activities:                                  HEP: Exercises  Access Code: [email or web address]  URL: [email or web address]  Date: 11/23/2021  Prepared by: Yrn Fuentes    Exercises  Seated Wrist Extension PROM - 2 x daily - 7 x weekly - 5 reps - 15 second hold  Seated Wrist Prayer Stretch - 2 x daily - 7 x weekly - 5 sets - 15 second hold  Seated Wrist Flexion with Overpressure - 2 x daily - 7 x weekly - 10 reps - 15 second hold  Seated Finger Composite Flexion Stretch - 2 x daily - 7 x weekly - 3 reps - 2 minuessccond hold  Wrist Pronation Stretch - 2 x daily - 7 x weekly - 5 sets - 15 second hold  Seated Wrist Supination Stretch - 2 x daily - 7 x weekly - 5 sets - 15 second hold    Elastic sleeves issued for both left/right wrists for edema control - wear daily. [employer]  Treatment/Session Summary:    · Response to Treatment:  Finally able to gain supination and wrist extension, now to ~ 35 deg supination, 20 deg wrist extension - stiff end feels. Digit/palmar swelling still limiting progress. · Communication/Consultation:  None today  · Equipment provided today:  None today  · Recommendations/Intent for next treatment session: Next visit will focus on P/AA/AROM R and L wrists, scar massage /mobilization, Progressive strength activities.  Edema massage and forearm wrist mobilization    Total Treatment Billable Duration:  45 min  PT Patient Time In/Time Out  Time In: 1400  Time Out: 1448  Sandra Brush PT    Future Appointments   Date Time Provider Kamille Reis   11/30/2021  2:45 PM Juluis Melita, PT [facility]   12/3/2021 11:00 AM Juluis Melita, PT [facility]   12/6/2021 11:45 AM Juluis Melita, PT [facility]   12/6/2021  1:30 PM Faiza Kathleen PA [facility]   12/9/2021 11:00 AM Juluis Melita, PT [facility]   12/13/2021 11:45 AM Juluis Melita, PT [facility]   12/16/2021 11:00 AM Juluis Melita, PT [facility]   12/20/2021 11:45 AM Juluis Melita, PT [facility]   12/22/2021 11:45 AM Juluis Melita, PT [facility]   12/29/2021 11:45 AM Donny Patel, PT [facility]

## 2021-11-30 ENCOUNTER — HOSPITAL ENCOUNTER (OUTPATIENT)
Dept: PHYSICAL THERAPY | Age: 81
Discharge: HOME | End: 2021-11-30
Payer: COMMERCIAL

## 2021-11-30 PROCEDURE — 97110 THERAPEUTIC EXERCISES: CPT

## 2021-11-30 PROCEDURE — 97140 MANUAL THERAPY 1/> REGIONS: CPT

## 2021-11-30 NOTE — PROGRESS NOTES
Annikane Kb Black  : 1940  Primary: [facility]*  Secondary:  [address] @ [place] [address].  [phone]   [phone]      OUTPATIENT PHYSICAL THERAPY: Progress Report and Daily Treatment Note  2021   ICD-10: Treatment Diagnosis: Pain in right wrist (M25.531), Stiffness of right wrist, not elsewhere classified (M25.631), Pain in left wrist (M25.532), Stiffness of left wrist, not elsewhere classified (M25.632), Stiffness of right hand, not elsewhere classified (M25.641) and Stiffness of left hand, not elsewhere classified (M25.642)  MEDICAL/REFERRING DIAGNOSIS:  Status post bilateral distal radius fractures   TREATMENT PLAN:  Effective Dates: 2021 TO 2021 (60 days). Frequency/Duration: 2 times a week for 60 Days  GOALS: (Goals have been discussed and agreed upon with patient.)  Short-Term Functional Goals: Time Frame: 4 weeks:   1. Independent performance of home exercise program (MET)  2. Reduce R hand edema to normal levels to allow normal finger to palm fist motions (Progressing)  3. Increase R wrist/hand ROM's to 75% or better of normal  In all planes of motion (NOT MET)  Discharge Goals: Time Frame: 12 weeks  1. Pt to demonstrate at least 35#   Strength L/R for normal weight carrying  2. Pt to demonstrate L/R wrist/ hand ROM's 85% or better of normal all planes   3. Improve Quick-DASH scores to 15/55 or better to reflect return to normal ADL function  Rehabilitation Potential For Stated Goals: Good  _________________________________________________________________________  Pre-treatment Symptoms/Complaints:  Pt states that she is staying out of her right hand brace most of day now - this has helped lessen some of the hand swelling.  Finger flexion ROM improving slowly, 5th finger now the least affected  Pain: Initial: 6/10 Post Session:  No increase/10   Medications Last Reviewed: 11/30/2021  Updated Objective Findings:    Observation/Orthostatic Postural Assessment:    Pt is wearing bilateral wrist splints. Palpation:    Diffuse right digital and distal hand (palmar /dorsal )pitting edema noted  ROM:    Left : Pt demonstrates ability to perform full left hook, open and full fists. AROM's : wrist flexion = 40 deg, extension = 45 deg, pronation/supination 75% of full AROM, Ulnar deviation = 18 deg, radial deviation = 20 deg. Right: Passive Michaell Radha digit ROM's: 2-5 =  50% flexion with full extension at PIP/DIP/MCP's, Thumb IP = full extension, 50% flexion, MCP = full extension, 50% flexion. CMC: 25% flexion. Wrist flexion P/AROM = 42 deg, extension A/PROM = 25  Deg, Ulnar deviation = 22 deg PROM, radial deviation = to 0 deg. R forearm active/passive. pronation/supination  = ~ 60 deg to ~ 40 deg. 11/30/2021: [id number] palmar crease measures: R hand: index: 7.8cm, middle: 7 cm, ring = 6 cm, 5th finger: 3cm. Strength:   Left:  digits all 4-/5, Wrist flex/ext all 3+/5  Right: digits all 2+/5, wrist flex/ext 2+/5   TREATMENT:   THERAPEUTIC ACTIVITY: ( see below for minutes): Therapeutic activities per grid below to improve mobility, strength, balance and coordination. Required minimal visual, verbal, manual and tactile cues to improve independence and safety with daily activities . THERAPEUTIC EXERCISE: (see below for minutes):  Exercises per grid below to improve mobility, strength, balance and coordination. Required minimal verbal and manual cues to promote proper body alignment, promote proper body posture and promote proper body mechanics. Progressed resistance, range, repetitions and complexity of movement as indicated. MANUAL THERAPY: (see below for minutes): Joint mobilization and Soft tissue mobilization was utilized and necessary because of the patient's restricted joint motion, painful spasm, loss of articular motion and restricted motion of soft tissue. MODALITIES: (see below for minutes):      to decrease pain    SELF CARE: (see below for minutes): Procedure(s) (per grid) utilized to improve and/or restore self-care/home management as related to dressing, bathing and grooming. Required minimal verbal cueing to facilitate activities of daily living skills and compensatory activities. Date: 11/17/2021 (visit 6) 11/23/2021 (visit 7) 11/30/2021 (visit 8)     Modalities:                                Therapeutic Exercise: 37 min 35 min 27 min      R digit (2-5) composite flexion PROM: 8 min R digit (2-5) composite flexion PROM R digit (2-5) composite flexion PROM: individually and in group 10 min      R thumb IP/MCP PROM 2 min R thumb IP/MCP PROM 2 min, CMC abduction, opposition x 2 min R Thumb MCP and CMC PROM's x 4 min      R forearm AA/AROM: pronation / supination w/ end range stretches x 10 R forearm AA/AROM: pronation / supination w/ end range stretches x 10 R wrist passive extension stretch : 5 min      R wrist flexion/extension: AAROM x 10 /AROM x 10, end range stretch holds R wrist flexion/extension: AAROM x 10 /AROM x 10, end range R forearm supination stretch: 3 min      R finger abd. Spreading- all x 10, individual 10 each R hand gripping into ball: 4 min L/R hand weightbearing into table wrist extn.  Stretching: 10 \" x 10       Resisted finger flexion: 3 x 10 manual, DIP flexions at edge of table x 20 Self prayer stretch x 10 reps       R thumb circumduction AROMs cw/ccw 10 each Self R wrist flexion stretch: 5 x 15\"       R hand gripping into ball: 4 min Self R wrist extension stretch 5 x 15\"       Left forearm Eduardo Dandy PROM's : 4 min HEP revision - see list.                               Proprioceptive Activities:                                Manual Therapy: 8 min 10 min 17 min      Edema massage R dorsal /palmar hand/thenar eminence Edema massage R dorsal /palmar hand/thenar eminence Repeat: 15 min      L/R diatal forearm scar massage Joint mobs: wrist R distraction, intercarpal glides gr. 3 Repeat: 2 min      Left wrist distraction gr. 3       Therapeutic Activities:                                  HEP: Exercises  Access Code: [email or web address]  URL: [email or web address]  Date: 11/23/2021  Prepared by: Gabriela Julian    Exercises  Seated Wrist Extension PROM - 2 x daily - 7 x weekly - 5 reps - 15 second hold  Seated Wrist Prayer Stretch - 2 x daily - 7 x weekly - 5 sets - 15 second hold  Seated Wrist Flexion with Overpressure - 2 x daily - 7 x weekly - 10 reps - 15 second hold  Seated Finger Composite Flexion Stretch - 2 x daily - 7 x weekly - 3 reps - 2 minuessccond hold  Wrist Pronation Stretch - 2 x daily - 7 x weekly - 5 sets - 15 second hold  Seated Wrist Supination Stretch - 2 x daily - 7 x weekly - 5 sets - 15 second hold    Elastic sleeves issued for both left/right wrists for edema control - wear daily. [email or web address]  Treatment/Session Summary:    · Response to Treatment:  Slower than expected progress towards meeting goals for edema reduction and finger/wrist ROM's Trial use of elastic garment ws not tolerated well by pt and discontinued. Pt continues to rquries skilled PT to address impairments, will consider increasing treatment frequency to 3x/week as pt's schedule will expand temporarily during school break. · Communication/Consultation:  None today  · Equipment provided today:  None today  · Recommendations/Intent for next treatment session: Next visit will focus on P/AA/AROM R and L wrists, scar massage /mobilization, Progressive strength activities.  Edema massage and forearm wrist mobilization    Total Treatment Billable Duration:  45 min  PT Patient Time In/Time Out  Time In: 1100  Time Out: 250 [facility], PT    Future Appointments   Date Time Provider Kamille Reis   12/3/2021 11:00 AM Olvin Caraballo, [facility]   12/6/2021 11:45 AM Olvin Caraballo PT [facility]   12/6/2021  1:30 PM Anthony Flax [place]   12/9/2021 11:00 AM [facility]   12/10/2021 11:00 AM [facility]   12/13/2021 11:45 AM [facility]   12/16/2021 11:00 AM [facility]   12/20/2021 11:45 AM [facility]iston [facility]iken[facility]   12/22/2021 11:45 AM [facility]iston [facility]iken[facility]   12/29/2021 11:45 AM Rohini Mccarthy, PT [facility]

## 2021-12-02 ENCOUNTER — APPOINTMENT (OUTPATIENT)
Dept: PHYSICAL THERAPY | Age: 81
End: 2021-12-02
Payer: COMMERCIAL

## 2021-12-03 ENCOUNTER — HOSPITAL ENCOUNTER (OUTPATIENT)
Dept: PHYSICAL THERAPY | Age: 81
Discharge: HOME | End: 2021-12-03
Payer: COMMERCIAL

## 2021-12-03 PROCEDURE — 97140 MANUAL THERAPY 1/> REGIONS: CPT

## 2021-12-03 PROCEDURE — 97110 THERAPEUTIC EXERCISES: CPT

## 2021-12-03 NOTE — PROGRESS NOTES
Daniel Black  : 1940  Primary: [facility]*  Secondary:  Sandy Vazquez @ [address]  [phone]   [phone]      OUTPATIENT PHYSICAL THERAPY:  Daily Treatment Note  12/3/2021   ICD-10: Treatment Diagnosis: Pain in right wrist (M25.531), Stiffness of right wrist, not elsewhere classified (M25.631), Pain in left wrist (M25.532), Stiffness of left wrist, not elsewhere classified (M25.632), Stiffness of right hand, not elsewhere classified (M25.641) and Stiffness of left hand, not elsewhere classified (M25.642)  MEDICAL/REFERRING DIAGNOSIS:  Status post bilateral distal radius fractures   TREATMENT PLAN:  Effective Dates: 2021 TO 2021 (60 days). Frequency/Duration: 2 times a week for 60 Days  GOALS: (Goals have been discussed and agreed upon with patient.)  Short-Term Functional Goals: Time Frame: 4 weeks:   1. Independent performance of home exercise program (MET)  2. Reduce R hand edema to normal levels to allow normal finger to palm fist motions (Progressing)  3. Increase R wrist/hand ROM's to 75% or better of normal  In all planes of motion (NOT MET)  Discharge Goals: Time Frame: 12 weeks  1. Pt to demonstrate at least 35#   Strength L/R for normal weight carrying  2. Pt to demonstrate L/R wrist/ hand ROM's 85% or better of normal all planes   3. Improve Quick-DASH scores to 15/55 or better to reflect return to normal ADL function  Rehabilitation Potential For Stated Goals: Good  _________________________________________________________________________  Pre-treatment Symptoms/Complaints: Pt feels that her right hand has turned the corner, due to combination of PT, home exercises. Pain: Initial: 10 Post Session:  No increase/10   Medications Last Reviewed:  12/3/2021  Updated Objective Findings:    Observation/Orthostatic Postural Assessment:    Pt is wearing bilateral wrist splints. Palpation:    Diffuse right digital and distal hand (palmar /dorsal )pitting edema noted  ROM:    Left : Pt demonstrates ability to perform full left hook, open and full fists. AROM's : wrist flexion = 40 deg, extension = 45 deg, pronation/supination 75% of full AROM, Ulnar deviation = 18 deg, radial deviation = 20 deg. Right: Passive Maria Guadalupe Ao digit ROM's: 2-5 =  50% flexion with full extension at PIP/DIP/MCP's, MCP fleixon ~ 70 deg all. , Thumb IP = full extension, 50% flexion, MCP = full extension, 50% flexion. CMC: 25% flexion. CMC radial abduction 40 deg. Wrist flexion P/AROM = 42 deg, extension A/PROM = 25  Deg, Ulnar deviation = 22 deg PROM, radial deviation = to 0 deg. R forearm active/passive. pronation/supination  = ~ 60 deg to ~ 40 deg. 11/30/2021: Wvhxhgolcy-fk-ypejqfot palmar crease measures: R hand: index: 7.8cm, middle: 7 cm, ring = 6 cm, 5th finger: 3cm. Strength:   Left:  digits all 4-/5, Wrist flex/ext all 3+/5  Right: digits all 2+/5, wrist flex/ext 2+/5   TREATMENT:   THERAPEUTIC ACTIVITY: ( see below for minutes): Therapeutic activities per grid below to improve mobility, strength, balance and coordination. Required minimal visual, verbal, manual and tactile cues to improve independence and safety with daily activities . THERAPEUTIC EXERCISE: (see below for minutes):  Exercises per grid below to improve mobility, strength, balance and coordination. Required minimal verbal and manual cues to promote proper body alignment, promote proper body posture and promote proper body mechanics. Progressed resistance, range, repetitions and complexity of movement as indicated. MANUAL THERAPY: (see below for minutes): Joint mobilization and Soft tissue mobilization was utilized and necessary because of the patient's restricted joint motion, painful spasm, loss of articular motion and restricted motion of soft tissue.    MODALITIES: (see below for minutes):      to decrease pain    SELF CARE: (see below for minutes): Procedure(s) (per grid) utilized to improve and/or restore self-care/home management as related to dressing, bathing and grooming. Required minimal verbal cueing to facilitate activities of daily living skills and compensatory activities. Date: 11/17/2021 (visit 6) 11/23/2021 (visit 7) 11/30/2021 (visit 8) 12/3/2021 (visit 9)    Modalities:                                Therapeutic Exercise: 37 min 35 min 27 min 27 min     R digit (2-5) composite flexion PROM: 8 min R digit (2-5) composite flexion PROM R digit (2-5) composite flexion PROM: individually and in group 10 min R 2-5 MCP passive flexion stretch: 3 min     R thumb IP/MCP PROM 2 min R thumb IP/MCP PROM 2 min, CMC abduction, opposition x 2 min R Thumb MCP and CMC PROM's x 4 min R hand yellow ball gripping 1 sec holds  x 3 min     R forearm AA/AROM: pronation / supination w/ end range stretches x 10 R forearm AA/AROM: pronation / supination w/ end range stretches x 10 R wrist passive extension stretch : 5 min R thumb to index lateral pinches yellow putty x 10     R wrist flexion/extension: AAROM x 10 /AROM x 10, end range stretch holds R wrist flexion/extension: AAROM x 10 /AROM x 10, end range R forearm supination stretch: 3 min R thumb-index pad pinches yellow putty x 15     R finger abd. Spreading- all x 10, individual 10 each R hand gripping into ball: 4 min L/R hand weightbearing into table wrist extn.  Stretching: 10 \" x 10  R interdigit adduction 2-3, 3-4, 4-5: yellow putty x 10 each     Resisted finger flexion: 3 x 10 manual, DIP flexions at edge of table x 20 Self prayer stretch x 10 reps  R thumb manl resist: 15 each palmar and radial abdductions     R thumb circumduction AROMs cw/ccw 10 each Self R wrist flexion stretch: 5 x 15\"  R fingers 2-5 hook fist with manual blocking x 20     R hand gripping into ball: 4 min Self R wrist extension stretch 5 x 15\"       Left forearm Harsh Spainer PROM's : 4 min HEP revision - see list. Proprioceptive Activities:                                Manual Therapy: 8 min 10 min 17 min 20 min     Edema massage R dorsal /palmar hand/thenar eminence Edema massage R dorsal /palmar hand/thenar eminence Repeat: 15 min Edema massage R digits, dorsal hand to wrist: 10 min     L/R diatal forearm scar massage Joint mobs: wrist R distraction, intercarpal glides gr. 3 Repeat: 2 min R wrist Distraction, palmar glides gr. 3-4        L wrist distraction, distal radio-ulnar ap glides gr. 3.      Left wrist distraction gr. 3   R MCP's 2-3-4 palmar glides gr. 3-4    Therapeutic Activities:                                  HEP: Exercises  Access Code: [email or web address]  URL: [email or web address]  Date: 11/23/2021  Prepared by: Keisha Callejas    Exercises  Seated Wrist Extension PROM - 2 x daily - 7 x weekly - 5 reps - 15 second hold  Seated Wrist Prayer Stretch - 2 x daily - 7 x weekly - 5 sets - 15 second hold  Seated Wrist Flexion with Overpressure - 2 x daily - 7 x weekly - 10 reps - 15 second hold  Seated Finger Composite Flexion Stretch - 2 x daily - 7 x weekly - 3 reps - 2 minuessccond hold  Wrist Pronation Stretch - 2 x daily - 7 x weekly - 5 sets - 15 second hold  Seated Wrist Supination Stretch - 2 x daily - 7 x weekly - 5 sets - 15 second hold    Elastic sleeves issued for both left/right wrists for edema control - wear daily. [employer]  Treatment/Session Summary:    · Response to Treatment:  Less dorsal hand edema, able to see veins on hand. Composite fist AROM continues to be limited but slowly gaining ability to pinch grasp index to thumb, perform partial hook fist.   · Communication/Consultation:  None today  · Equipment provided today:  None today  · Recommendations/Intent for next treatment session: Next visit will focus on P/AA/AROM R and L wrists, scar massage /mobilization, Progressive strength activities.  Edema massage and forearm wrist mobilization    Total Treatment Billable Duration:  47 min  PT Patient Time In/Time Out  Time In: 1100  Time Out: [address], PT    Future Appointments   Date Time Provider Kamille Reis   12/6/2021 11:45 AM Nigridwillow Jeters, PT [facility]   12/9/2021 10:00 AM Chris Hernandez NP [facility]   12/9/2021 11:00 AM Ingrid Coronas, PT [facility]   12/10/2021 11:00 AM Ingrid Brmayelins, PT [facility]   12/13/2021 11:45 AM Ingrid Brmayelins, PT [facility]   12/16/2021 11:00 AM Ingrid Brinks, PT [facility]   12/20/2021 11:45 AM Ingrid Brinks, PT [facility]   12/22/2021 11:45 AM Ingrid Brmayelins, PT [facility]   12/29/2021 11:45 AM Vivien Adhikari, Roxi Junior, PT [facility]

## 2021-12-06 ENCOUNTER — HOSPITAL ENCOUNTER (OUTPATIENT)
Dept: PHYSICAL THERAPY | Age: 81
Discharge: HOME | End: 2021-12-06
Payer: COMMERCIAL

## 2021-12-06 PROCEDURE — 97140 MANUAL THERAPY 1/> REGIONS: CPT

## 2021-12-06 PROCEDURE — 97110 THERAPEUTIC EXERCISES: CPT

## 2021-12-06 NOTE — PROGRESS NOTES
Pamela Black  : 1940  Primary: [facility]*  Secondary:  [address] @ [place] [address].  [phone]   [phone]      OUTPATIENT PHYSICAL THERAPY:  Daily Treatment Note  2021   ICD-10: Treatment Diagnosis: Pain in right wrist (M25.531), Stiffness of right wrist, not elsewhere classified (M25.631), Pain in left wrist (M25.532), Stiffness of left wrist, not elsewhere classified (M25.632), Stiffness of right hand, not elsewhere classified (M25.641) and Stiffness of left hand, not elsewhere classified (M25.642)  MEDICAL/REFERRING DIAGNOSIS:  Status post bilateral distal radius fractures   TREATMENT PLAN:  Effective Dates: 2021 TO 2021 (60 days). Frequency/Duration: 2 times a week for 60 Days  GOALS: (Goals have been discussed and agreed upon with patient.)  Short-Term Functional Goals: Time Frame: 4 weeks:   1. Independent performance of home exercise program (MET)  2. Reduce R hand edema to normal levels to allow normal finger to palm fist motions (Progressing)  3. Increase R wrist/hand ROM's to 75% or better of normal  In all planes of motion (NOT MET)  Discharge Goals: Time Frame: 12 weeks  1. Pt to demonstrate at least 35#   Strength L/R for normal weight carrying  2. Pt to demonstrate L/R wrist/ hand ROM's 85% or better of normal all planes   3. Improve Quick-DASH scores to 15/55 or better to reflect return to normal ADL function  Rehabilitation Potential For Stated Goals: Good  _________________________________________________________________________  Pre-treatment Symptoms/Complaints: Pt experienced intermittent sharp pain at the ulnar pin-site. She continues to regularly perform her home exercises.     Pain: Initial: 6/10 Post Session:  No increase/10   Medications Last Reviewed:  2021  Updated Objective Findings:    Observation/Orthostatic Postural Assessment:    Pt is wearing bilateral wrist splints. Palpation:    Diffuse right digital and distal hand (palmar /dorsal )pitting edema noted  ROM:    Left : Pt demonstrates ability to perform full left hook, open and full fists. AROM's : wrist flexion = 40 deg, extension = 45 deg, pronation/supination 75% of full AROM, Ulnar deviation = 18 deg, radial deviation = 20 deg. Right: Passive Reading Croon digit ROM's: 2-5 =  50% flexion with full extension at PIP/DIP/MCP's, MCP fleixon ~ 75 deg all. , Thumb IP = full extension, 50% flexion, MCP = full extension, 50% flexion. CMC: 25% flexion. CMC radial abduction 40 deg. Wrist flexion P/AROM = 42 deg, extension A/PROM = 30  Deg, Ulnar deviation = 22 deg PROM, radial deviation = to ~15 deg. R forearm active/passive. pronation/supination  = ~ 60 deg to ~ 40 deg. 11/30/2021: [id number]-lc-vmprzsnk palmar crease measures: R hand: index: 7.8cm, middle: 7 cm, ring = 6 cm, 5th finger: 3cm. Strength:   Left:  digits all 4-/5, Wrist flex/ext all 3+/5  Right: digits all 2+/5, wrist flex/ext 2+/5   TREATMENT:   THERAPEUTIC ACTIVITY: ( see below for minutes): Therapeutic activities per grid below to improve mobility, strength, balance and coordination. Required minimal visual, verbal, manual and tactile cues to improve independence and safety with daily activities . THERAPEUTIC EXERCISE: (see below for minutes):  Exercises per grid below to improve mobility, strength, balance and coordination. Required minimal verbal and manual cues to promote proper body alignment, promote proper body posture and promote proper body mechanics. Progressed resistance, range, repetitions and complexity of movement as indicated. MANUAL THERAPY: (see below for minutes): Joint mobilization and Soft tissue mobilization was utilized and necessary because of the patient's restricted joint motion, painful spasm, loss of articular motion and restricted motion of soft tissue.    MODALITIES: (see below for minutes):      to decrease pain    SELF CARE: (see below for minutes): Procedure(s) (per grid) utilized to improve and/or restore self-care/home management as related to dressing, bathing and grooming. Required minimal verbal cueing to facilitate activities of daily living skills and compensatory activities. Date: 11/17/2021 (visit 6) 11/23/2021 (visit 7) 11/30/2021 (visit 8) 12/3/2021 (visit 9) 12/6/2021 (visit 10)   Modalities:                                Therapeutic Exercise: 37 min 35 min 27 min 27 min 32 min    R digit (2-5) composite flexion PROM: 8 min R digit (2-5) composite flexion PROM R digit (2-5) composite flexion PROM: individually and in group 10 min R 2-5 MCP passive flexion stretch: 3 min R wrist - passive extn. Stretch: 4 min    R thumb IP/MCP PROM 2 min R thumb IP/MCP PROM 2 min, CMC abduction, opposition x 2 min R Thumb MCP and CMC PROM's x 4 min R hand yellow ball gripping 1 sec holds  x 3 min R 2-5 MCP passive flexion stretch[de-identified] individual and combined: 5 min    R forearm AA/AROM: pronation / supination w/ end range stretches x 10 R forearm AA/AROM: pronation / supination w/ end range stretches x 10 R wrist passive extension stretch : 5 min R thumb to index lateral pinches yellow putty x 10 R thumb to index, thumb to middle finger pinches; yellow putty 15 each    R wrist flexion/extension: AAROM x 10 /AROM x 10, end range stretch holds R wrist flexion/extension: AAROM x 10 /AROM x 10, end range R forearm supination stretch: 3 min R thumb-index pad pinches yellow putty x 15 R thumb manl resist: 15 each palmar abd    R finger abd. Spreading- all x 10, individual 10 each R hand gripping into ball: 4 min L/R hand weightbearing into table wrist extn.  Stretching: 10 \" x 10  R interdigit adduction 2-3, 3-4, 4-5: yellow putty x 10 each R fingers 2-5 hook fist and full fist  with manual resist - 15 each    Resisted finger flexion: 3 x 10 manual, DIP flexions at edge of table x 20 Self prayer stretch x 10 reps  R thumb manl resist: 15 each palmar and radial abdductions R thumb manl . Resist combined flexion 15x    R thumb circumduction AROMs cw/ccw 10 each Self R wrist flexion stretch: 5 x 15\"  R fingers 2-5 hook fist with manual blocking x 20 Manl resist R wrist flexion, extension 15 each. R hand gripping into ball: 4 min Self R wrist extension stretch 5 x 15\"       Left forearm Chrystine Renato PROM's : 4 min HEP revision - see list.                               Proprioceptive Activities:                                Manual Therapy: 8 min 10 min 17 min 20 min 12 min    Edema massage R dorsal /palmar hand/thenar eminence Edema massage R dorsal /palmar hand/thenar eminence Repeat: 15 min Edema massage R digits, dorsal hand to wrist: 10 min R wrist: gr.3-4 distraction, palmar glides    L/R diatal forearm scar massage Joint mobs: wrist R distraction, intercarpal glides gr. 3 Repeat: 2 min R wrist Distraction, palmar glides gr. 3-4 R distal radio-ulnar ap glides gr. 3-4       L wrist distraction, distal radio-ulnar ap glides gr. 3.  R radiocarpal: radio and ulnar glides gr. 4    Left wrist distraction gr. 3   R MCP's 2-3-4 palmar glides gr. 3-4 R 2nd and 3rd MCP palmar glides. Therapeutic Activities:                                  HEP: Exercises  Access Code: [email or web address]  URL: [email or web address]  Date: 11/23/2021  Prepared by: Gene Villa    Exercises  Seated Wrist Extension PROM - 2 x daily - 7 x weekly - 5 reps - 15 second hold  Seated Wrist Prayer Stretch - 2 x daily - 7 x weekly - 5 sets - 15 second hold  Seated Wrist Flexion with Overpressure - 2 x daily - 7 x weekly - 10 reps - 15 second hold  Seated Finger Composite Flexion Stretch - 2 x daily - 7 x weekly - 3 reps - 2 minuessccond hold  Wrist Pronation Stretch - 2 x daily - 7 x weekly - 5 sets - 15 second hold  Seated Wrist Supination Stretch - 2 x daily - 7 x weekly - 5 sets - 15 second hold    Elastic sleeves issued for both left/right wrists for edema control - wear daily. [employer] Portal  Treatment/Session Summary:    · Response to Treatment:  Pt finally able to perform index to thumb pad opposition. Wrist extension PROM increased to 30 deg today, MCP flexions at ~75 deg each   · Communication/Consultation:  None today  · Equipment provided today:  None today  · Recommendations/Intent for next treatment session: Next visit will focus on P/AA/AROM R and L wrists, scar massage /mobilization, Progressive strength activities.  Edema massage and forearm wrist mobilization    Total Treatment Billable Duration:  45 min  PT Patient Time In/Time Out  Time In: 1145  Time Out: [address], PT    Future Appointments   Date Time Provider Kamille Reis   12/9/2021 10:00 AM Chris Hernandez, SHANNON [facility]   12/9/2021 11:00 AM In[facility] Miles, PT [facility]   12/10/2021 11:00 AM Ingrid Miles, PT [facility]   12/13/2021 11:45 AM Ingrid Coronas, PT [facility]   12/16/2021 11:00 AM Ingrid Coronas, PT [facility]   12/20/2021 11:45 AM Ingrid Coronas, PT [facility]   12/22/2021 11:45 AM Ingrid Brmayelins, PT [facility]   12/29/2021 11:45 AM [facility]oxi Ruano, [facility]

## 2021-12-09 ENCOUNTER — HOSPITAL ENCOUNTER (OUTPATIENT)
Dept: PHYSICAL THERAPY | Age: 81
Discharge: HOME | End: 2021-12-09
Payer: COMMERCIAL

## 2021-12-09 PROBLEM — E66.01 SEVERE OBESITY (BMI 35.0-35.9 WITH COMORBIDITY) (HCC): Status: ACTIVE | Noted: 2021-12-09

## 2021-12-09 PROCEDURE — 97140 MANUAL THERAPY 1/> REGIONS: CPT

## 2021-12-09 PROCEDURE — 97110 THERAPEUTIC EXERCISES: CPT

## 2021-12-09 NOTE — PROGRESS NOTES
Shelly Black  : 1940  Primary: [facility]*  Secondary:  Libra Valdez @ [place] [address].  [phone]   [phone]      OUTPATIENT PHYSICAL THERAPY:  Daily Treatment Note  2021   ICD-10: Treatment Diagnosis: Pain in right wrist (M25.531), Stiffness of right wrist, not elsewhere classified (M25.631), Pain in left wrist (M25.532), Stiffness of left wrist, not elsewhere classified (M25.632), Stiffness of right hand, not elsewhere classified (M25.641) and Stiffness of left hand, not elsewhere classified (M25.642)  MEDICAL/REFERRING DIAGNOSIS:  Status post bilateral distal radius fractures   TREATMENT PLAN:  Effective Dates: 2021 TO 2021 (60 days). Frequency/Duration: 2 times a week for 60 Days  GOALS: (Goals have been discussed and agreed upon with patient.)  Short-Term Functional Goals: Time Frame: 4 weeks:   1. Independent performance of home exercise program (MET)  2. Reduce R hand edema to normal levels to allow normal finger to palm fist motions (Progressing)  3. Increase R wrist/hand ROM's to 75% or better of normal  In all planes of motion (NOT MET)  Discharge Goals: Time Frame: 12 weeks  1. Pt to demonstrate at least 35#   Strength L/R for normal weight carrying  2. Pt to demonstrate L/R wrist/ hand ROM's 85% or better of normal all planes   3. Improve Quick-DASH scores to 15/55 or better to reflect return to normal ADL function  Rehabilitation Potential For Stated Goals: Good  _________________________________________________________________________  Pre-treatment Symptoms/Complaints: Pt noticing less swelling in dorsal hand, starting to see the veins in the back of her hand again.     Pain: Initial: 10 Post Session:  No increase/10   Medications Last Reviewed:  2021  Updated Objective Findings:    Observation/Orthostatic Postural Assessment:    Pt is wearing bilateral wrist splints. Palpation:    Diffuse right digital and distal hand (palmar /dorsal )pitting edema noted  ROM:    Left : Pt demonstrates ability to perform full left hook, open and full fists. AROM's : wrist flexion = 40 deg, extension = 45 deg, pronation/supination 75% of full AROM, Ulnar deviation = 18 deg, radial deviation = 20 deg. Right: Passive Ulysses First digit ROM's: 2-5 =  50% flexion with full extension at PIP/DIP/MCP's, MCP fleixon ~ 75 deg all. , Thumb IP = full extension, 50% flexion, MCP = full extension, 50% flexion. CMC: 25% flexion. CMC radial abduction 40 deg. Wrist flexion P/AROM = 40 deg, extension A/PROM = 35  Deg (12/9/2021), Ulnar deviation = 22 deg PROM, radial deviation = to ~15 deg. R forearm active/passive. pronation/supination  = ~ 60 deg to ~ 40 deg. 11/30/2021: Lhmrvqjmhd-kj-xefanjot palmar crease measures: R hand: index: 7.8cm, middle: 7 cm, ring = 6 cm, 5th finger: 3cm. Strength:   Left:  digits all 4-/5, Wrist flex/ext all 3+/5  Right: digits all 2+/5, wrist flex/ext 2+/5   TREATMENT:   THERAPEUTIC ACTIVITY: ( see below for minutes): Therapeutic activities per grid below to improve mobility, strength, balance and coordination. Required minimal visual, verbal, manual and tactile cues to improve independence and safety with daily activities . THERAPEUTIC EXERCISE: (see below for minutes):  Exercises per grid below to improve mobility, strength, balance and coordination. Required minimal verbal and manual cues to promote proper body alignment, promote proper body posture and promote proper body mechanics. Progressed resistance, range, repetitions and complexity of movement as indicated. MANUAL THERAPY: (see below for minutes): Joint mobilization and Soft tissue mobilization was utilized and necessary because of the patient's restricted joint motion, painful spasm, loss of articular motion and restricted motion of soft tissue.    MODALITIES: (see below for minutes):      to decrease pain    SELF CARE: (see below for minutes): Procedure(s) (per grid) utilized to improve and/or restore self-care/home management as related to dressing, bathing and grooming. Required minimal verbal cueing to facilitate activities of daily living skills and compensatory activities. Date: 12/9/2021 (visit 11)       Modalities:                                Therapeutic Exercise: 35 min        R wrist - passive extn. Stretch: 5 min        R 2-5 MCP passive flexion stretch[de-identified] individual and combined: 5 min        R thumb to index finger pinches; yellow putty 15x        R radiocarpal ulnar deviation and radial dev. Passive stretching: 3 min        R fingers 2-5 full fist with manual resist - 15x        Manl resist R wrist flexion, extension 15 each. R 2-3, 3-4, 4-5 adduction w/ yellow theraputty resist: 10 each                                                Proprioceptive Activities:                                Manual Therapy: 10 min        R wrist: gr.3-4 distraction, palmar glides        R 2nd and 3rd MCP palmar glides. Therapeutic Activities:                                  HEP: Exercises  Access Code: [email or web address]  URL: [email or web address]  Date: 11/23/2021  Prepared by: Derrek Bradford    Exercises  Seated Wrist Extension PROM - 2 x daily - 7 x weekly - 5 reps - 15 second hold  Seated Wrist Prayer Stretch - 2 x daily - 7 x weekly - 5 sets - 15 second hold  Seated Wrist Flexion with Overpressure - 2 x daily - 7 x weekly - 10 reps - 15 second hold  Seated Finger Composite Flexion Stretch - 2 x daily - 7 x weekly - 3 reps - 2 minuessccond hold  Wrist Pronation Stretch - 2 x daily - 7 x weekly - 5 sets - 15 second hold  Seated Wrist Supination Stretch - 2 x daily - 7 x weekly - 5 sets - 15 second hold    Elastic sleeves issued for both left/right wrists for edema control - wear daily.        [employer]  Treatment/Session Summary:    · Response to Treatment: Further increase in wrist extension to 35 deg. Slow increases in finger flexion total active motion. · Communication/Consultation:  None today  · Equipment provided today:  None today  · Recommendations/Intent for next treatment session: Next visit will focus on P/AA/AROM R and L wrists, scar massage /mobilization, Progressive strength activities.  Edema massage and forearm wrist mobilization    Total Treatment Billable Duration:  45 min  PT Patient Time In/Time Out  Time In: 1100  Time Out: 250 [facility], PT    Future Appointments   Date Time Provider Kamille Reis   12/10/2021 11:00 AM Claire Baez, PT [facility]   12/13/2021 11:45 AM Claire Rash, PT [facility]   12/16/2021 11:00 AM Claire Rash, PT [facility]   12/17/2021 11:00 AM Claire Rash, PT [facility]   12/20/2021 11:45 AM Claire Rash, PT [facility]   12/22/2021 11:45 AM Claire Rash, PT [facility]   12/29/2021 11:45 AM Zuri Jo, PT [facility]

## 2021-12-10 ENCOUNTER — HOSPITAL ENCOUNTER (OUTPATIENT)
Dept: PHYSICAL THERAPY | Age: 81
Discharge: HOME | End: 2021-12-10
Payer: COMMERCIAL

## 2021-12-10 PROCEDURE — 97110 THERAPEUTIC EXERCISES: CPT

## 2021-12-10 PROCEDURE — 97140 MANUAL THERAPY 1/> REGIONS: CPT

## 2021-12-10 NOTE — PROGRESS NOTES
Ailyn Black  : 1940  Primary: [facility] [place]*  Secondary:  Yani Storm @ [address]  [address] [place] [address].  [phone]   [phone]      OUTPATIENT PHYSICAL THERAPY:  Daily Treatment Note  12/10/2021   ICD-10: Treatment Diagnosis: Pain in right wrist (M25.531), Stiffness of right wrist, not elsewhere classified (M25.631), Pain in left wrist (M25.532), Stiffness of left wrist, not elsewhere classified (M25.632), Stiffness of right hand, not elsewhere classified (M25.641) and Stiffness of left hand, not elsewhere classified (M25.642)  MEDICAL/REFERRING DIAGNOSIS:  Status post bilateral distal radius fractures   TREATMENT PLAN:  Effective Dates: 2021 TO 2021 (60 days). Frequency/Duration: 2 times a week for 60 Days  GOALS: (Goals have been discussed and agreed upon with patient.)  Short-Term Functional Goals: Time Frame: 4 weeks:   1. Independent performance of home exercise program (MET)  2. Reduce R hand edema to normal levels to allow normal finger to palm fist motions (Progressing)  3. Increase R wrist/hand ROM's to 75% or better of normal  In all planes of motion (NOT MET)  Discharge Goals: Time Frame: 12 weeks  1. Pt to demonstrate at least 35#   Strength L/R for normal weight carrying  2. Pt to demonstrate L/R wrist/ hand ROM's 85% or better of normal all planes   3. Improve Quick-DASH scores to 15/55 or better to reflect return to normal ADL function  Rehabilitation Potential For Stated Goals: Good  _________________________________________________________________________  Pre-treatment Symptoms/Complaints: Pt noticing less swelling in dorsal hand, starting to see the veins in the back of her hand again.     Pain: Initial: 6/10 Post Session:  No increase/10   Medications Last Reviewed:  12/10/2021  Updated Objective Findings:    Observation/Orthostatic Postural Assessment:    Pt is wearing bilateral wrist splints. Palpation:    Diffuse right digital and distal hand (palmar /dorsal )pitting edema noted  ROM:    Left : Pt demonstrates ability to perform full left hook, open and full fists. AROM's : wrist flexion = 40 deg, extension = 45 deg, pronation/supination 75% of full AROM, Ulnar deviation = 18 deg, radial deviation = 20 deg. Right: Passive Wayne Massey digit ROM's: 2-5 =  50% flexion with full extension at PIP/DIP/MCP's, MCP fleixon ~ 75 deg all. , Thumb IP = full extension, 50% flexion, MCP = full extension, 50% flexion. CMC: 25% flexion. CMC radial abduction 40 deg. Wrist flexion P/AROM = 40 deg, extension A/PROM = 35  Deg (12/9/2021), Ulnar deviation = 22 deg PROM, radial deviation = to ~15 deg. R forearm active/passive. pronation/supination  = ~ 60 deg to ~ 40 deg. 12/10/2021: Flpbempmbc-aj-poeyeycf palmar crease measures: R hand: index: 5.8cm, middle: 6 cm,    11/30/2021:  ring = 6 cm, 5th finger: 3cm. Strength:   Left:  digits all 4-/5, Wrist flex/ext all 3+/5  Right: digits all 2+/5, wrist flex/ext 2+/5   TREATMENT:   THERAPEUTIC ACTIVITY: ( see below for minutes): Therapeutic activities per grid below to improve mobility, strength, balance and coordination. Required minimal visual, verbal, manual and tactile cues to improve independence and safety with daily activities . THERAPEUTIC EXERCISE: (see below for minutes):  Exercises per grid below to improve mobility, strength, balance and coordination. Required minimal verbal and manual cues to promote proper body alignment, promote proper body posture and promote proper body mechanics. Progressed resistance, range, repetitions and complexity of movement as indicated. MANUAL THERAPY: (see below for minutes): Joint mobilization and Soft tissue mobilization was utilized and necessary because of the patient's restricted joint motion, painful spasm, loss of articular motion and restricted motion of soft tissue.    MODALITIES: (see below for minutes):      to decrease pain    SELF CARE: (see below for minutes): Procedure(s) (per grid) utilized to improve and/or restore self-care/home management as related to dressing, bathing and grooming. Required minimal verbal cueing to facilitate activities of daily living skills and compensatory activities. Date: 12/9/2021 (visit 11) 12/10/2021 (visit 12)      Modalities:                                Therapeutic Exercise: 35 min 35 min       R wrist - passive extn. Stretch: 5 min R 2-3 MCP/PIP/DIP passive flexion stretch[de-identified] individual and combined: 12 min       R 2-5 MCP passive flexion stretch[de-identified] individual and combined: 5 min Finger abduction 2nd/5th : manl reisst x 15        R thumb to index finger pinches; yellow putty 15x R 2nd-5th finger extensions: manl resist       R radiocarpal ulnar deviation and radial dev. Passive stretching: 3 min R thumb to index opposition passive stretch; 5 min       R fingers 2-5 full fist with manual resist - 15x Manual blocking at DIP x 15, PIP/DIP x 15: finger flexions       Manl resist R wrist flexion, extension 15 each. Dowel gripping x 5\" x 3 min       R 2-3, 3-4, 4-5 adduction w/ yellow theraputty resist: 10 each [place] pickups x 10 - 3 jaw kathrin         HEP review                                      Proprioceptive Activities:                                Manual Therapy: 10 min 10 min       R wrist: gr.3-4 distraction, palmar glides R index/middle finger and dorsal hand edema massage       R 2nd and 3rd MCP palmar glides. R 2nd MCP distraction gr. 3                      Therapeutic Activities:                                  HEP: Exercises  Access Code: [email or web address]  URL: [email or web address]  Date: 11/23/2021  Prepared by: Morgan Hermosillo    Exercises  Seated Wrist Extension PROM - 2 x daily - 7 x weekly - 5 reps - 15 second hold  Seated Wrist Prayer Stretch - 2 x daily - 7 x weekly - 5 sets - 15 second hold  Seated Wrist Flexion with Overpressure - 2 x daily - 7 x weekly - 10 reps - 15 second hold  Seated Finger Composite Flexion Stretch - 2 x daily - 7 x weekly - 3 reps - 2 minuessccond hold  Wrist Pronation Stretch - 2 x daily - 7 x weekly - 5 sets - 15 second hold  Seated Wrist Supination Stretch - 2 x daily - 7 x weekly - 5 sets - 15 second hold    Added 12/10/2021: Emphasize 5 to 10 min holds on steady finger flexion stretch (MCP/IP to palm). OK to work on thumb-finger pinch using clothespin squeeze. [employer]  Treatment/Session Summary:    · Response to Treatment:  Able to increase RODRIGEZ at 2nd and 3rd finger flexions. · Communication/Consultation:  None today  · Equipment provided today:  None today  · Recommendations/Intent for next treatment session: Next visit will focus on P/AA/AROM R and L wrists, scar massage /mobilization, Progressive strength activities.  Edema massage and forearm wrist mobilization    Total Treatment Billable Duration:  45 min     Kike Guzman PT    Future Appointments   Date Time Provider Kamille Reis   12/10/2021 11:00 AM Cece Plascencia, PT [facility]   12/13/2021 11:45 AM Onofre Gilliland, PT [facility]   12/16/2021 11:00 AM Cece Plascencia, PT [facility]   12/17/2021 11:00 AM Cece Plascencia, PT [facility]   12/20/2021 11:45 AM Cece Plascencia PT [facility]   12/22/2021 11:45 AM Cece Plascencia, PT [facility]   12/29/2021 11:45 AM Onofre Gilliland, PT [facility]

## 2021-12-13 ENCOUNTER — HOSPITAL ENCOUNTER (OUTPATIENT)
Dept: PHYSICAL THERAPY | Age: 81
Discharge: HOME | End: 2021-12-13
Payer: COMMERCIAL

## 2021-12-13 PROCEDURE — 97140 MANUAL THERAPY 1/> REGIONS: CPT

## 2021-12-13 PROCEDURE — 97110 THERAPEUTIC EXERCISES: CPT

## 2021-12-13 NOTE — PROGRESS NOTES
Mi Black  : 1940  Primary: [facility] [place]*  Secondary:  Thomas Ann @ [address], [place].  [phone]   [phone]      OUTPATIENT PHYSICAL THERAPY:  Daily Treatment Note  2021   ICD-10: Treatment Diagnosis: Pain in right wrist (M25.531), Stiffness of right wrist, not elsewhere classified (M25.631), Pain in left wrist (M25.532), Stiffness of left wrist, not elsewhere classified (M25.632), Stiffness of right hand, not elsewhere classified (M25.641) and Stiffness of left hand, not elsewhere classified (M25.642)  MEDICAL/REFERRING DIAGNOSIS:  Status post bilateral distal radius fractures   TREATMENT PLAN:  Effective Dates: 2021 TO 2021 (60 days). Frequency/Duration: 2 times a week for 60 Days  GOALS: (Goals have been discussed and agreed upon with patient.)  Short-Term Functional Goals: Time Frame: 4 weeks:   1. Independent performance of home exercise program (MET)  2. Reduce R hand edema to normal levels to allow normal finger to palm fist motions (Progressing)  3. Increase R wrist/hand ROM's to 75% or better of normal  In all planes of motion (NOT MET)  Discharge Goals: Time Frame: 12 weeks  1. Pt to demonstrate at least 35#   Strength L/R for normal weight carrying  2. Pt to demonstrate L/R wrist/ hand ROM's 85% or better of normal all planes   3. Improve Quick-DASH scores to 15/55 or better to reflect return to normal ADL function  Rehabilitation Potential For Stated Goals: Good  _________________________________________________________________________  Pre-treatment Symptoms/Complaints: Flexing her fingertips continues to be difficult. Works her thumb often as it's easiest to do.    Pain: Initial: 610 Post Session:  No increase/10   Medications Last Reviewed:  2021  Updated Objective Findings:    Observation/Orthostatic Postural Assessment:    Pt is wearing bilateral wrist splints. Palpation:    Diffuse right digital and distal hand (palmar /dorsal )pitting edema noted  ROM:    Left : Pt demonstrates ability to perform full left hook, open and full fists. AROM's : wrist flexion = 40 deg, extension = 45 deg, pronation/supination 75% of full AROM, Ulnar deviation = 18 deg, radial deviation = 20 deg. Right: Passive Camryn Rubins digit ROM's: 2-5 =  50% flexion with full extension at PIP/DIP/MCP's, MCP fleixon ~ 75 deg all. , Thumb IP = full extension, 50% flexion, MCP = full extension, 50% flexion. CMC: 25% flexion. CMC radial abduction 40 deg. Wrist flexion P/AROM = 40 deg, extension A/PROM = 35  Deg (12/9/2021), Ulnar deviation = 22 deg PROM, radial deviation = to ~15 deg. R forearm active/passive. pronation/supination  = ~ 60 deg to ~ 40 deg. 12/10/2021: Jesosqpozu-tv-vopfiodf palmar crease measures: R hand: index: 5.8cm, middle: 6 cm,    11/30/2021:  ring = 6 cm, 5th finger: 3cm. Strength:   Left:  digits all 4-/5, Wrist flex/ext all 3+/5  Right: digits all 2+/5, wrist flex/ext 2+/5   TREATMENT:   THERAPEUTIC ACTIVITY: ( see below for minutes): Therapeutic activities per grid below to improve mobility, strength, balance and coordination. Required minimal visual, verbal, manual and tactile cues to improve independence and safety with daily activities . THERAPEUTIC EXERCISE: (see below for minutes):  Exercises per grid below to improve mobility, strength, balance and coordination. Required minimal verbal and manual cues to promote proper body alignment, promote proper body posture and promote proper body mechanics. Progressed resistance, range, repetitions and complexity of movement as indicated. MANUAL THERAPY: (see below for minutes): Joint mobilization and Soft tissue mobilization was utilized and necessary because of the patient's restricted joint motion, painful spasm, loss of articular motion and restricted motion of soft tissue.    MODALITIES: (see below for minutes):      to decrease pain    SELF CARE: (see below for minutes): Procedure(s) (per grid) utilized to improve and/or restore self-care/home management as related to dressing, bathing and grooming. Required minimal verbal cueing to facilitate activities of daily living skills and compensatory activities. Date: 12/9/2021 (visit 11) 12/10/2021 (visit 12) 12/13/2021 (visit 13)     Modalities:                                Therapeutic Exercise: 35 min 35 min 33 min      R wrist - passive extn. Stretch: 5 min R 2-3 MCP/PIP/DIP passive flexion stretch[de-identified] individual and combined: 12 min R:2nd-5th  MCP/PIP/DIP passive flexion/long hold stretches      R 2-5 MCP passive flexion stretch[de-identified] individual and combined: 5 min Finger abduction 2nd/5th : manl reisst x 15  R finger abduction 2nd/5th: manual resist: 2 x 15      R thumb to index finger pinches; yellow putty 15x R 2nd-5th finger extensions: manl resist R 2nd-5th Combined flexion manl resist: 20x      R radiocarpal ulnar deviation and radial dev. Passive stretching: 3 min R thumb to index opposition passive stretch; 5 min R thumb IP flexion passive stretch: 2 min      R fingers 2-5 full fist with manual resist - 15x Manual blocking at DIP x 15, PIP/DIP x 15: finger flexions Ball gripping (red): x 2 min      Manl resist R wrist flexion, extension 15 each.  Dowel gripping x 5\" x 3 min R tthumb extension manl resist x 15      R 2-3, 3-4, 4-5 adduction w/ yellow theraputty resist: 10 each [facility] pickups x 10 - 3 jaw kathrin R thumb to index finger pinches; yellow putty 15x        HEP review R wrist extn passive stretch: 2 min        R forearm supination passive stretch: 2 min                             Proprioceptive Activities:                                Manual Therapy: 10 min 10 min 12 min      R wrist: gr.3-4 distraction, palmar glides R index/middle finger and dorsal hand edema massage R 2nd - 5th individual digit and  dorsal R hand edema massage: 10 min      R 2nd and 3rd MCP palmar glides. R 2nd MCP distraction gr. 3 R distal radio-ulnar ap glides. Gr. 3                     Therapeutic Activities:                                  HEP: Exercises  Access Code: [email or web address]  URL: [email or web address]  Date: 11/23/2021  Prepared by: Keisha Callejas    Exercises  Seated Wrist Extension PROM - 2 x daily - 7 x weekly - 5 reps - 15 second hold  Seated Wrist Prayer Stretch - 2 x daily - 7 x weekly - 5 sets - 15 second hold  Seated Wrist Flexion with Overpressure - 2 x daily - 7 x weekly - 10 reps - 15 second hold  Seated Finger Composite Flexion Stretch - 2 x daily - 7 x weekly - 3 reps - 2 minuessccond hold  Wrist Pronation Stretch - 2 x daily - 7 x weekly - 5 sets - 15 second hold  Seated Wrist Supination Stretch - 2 x daily - 7 x weekly - 5 sets - 15 second hold    Added 12/10/2021: Emphasize 5 to 10 min holds on steady finger flexion stretch (MCP/IP to palm). OK to work on thumb-finger pinch using clothespin squeeze. [facility] Portal  Treatment/Session Summary:    · Response to Treatment:  R 2nd PIP flexion to 80-85 deg, allowing weak index to thumb pinch. · Communication/Consultation:  None today  · Equipment provided today:  None today  · Recommendations/Intent for next treatment session: Next visit will focus on P/AA/AROM R and L wrists, scar massage /mobilization, Progressive strength activities.  Edema massage and forearm wrist mobilization    Total Treatment Billable Duration:  45 min  PT Patient Time In/Time Out  Time In: 1145  Time Out: [address] [place] [address], PT    Future Appointments   Date Time Provider Kamille Reis   12/16/2021 11:00 AM Mele Gutierrez, PT [facility]   12/17/2021 11:00 AM Mele Gutierrez PT [facility]   12/20/2021 11:45 AM Mele Gutierrez, PT [facility]   12/22/2021 11:45 AM Mele Gutierrez, PT [facility]   12/29/2021 11:45 AM Russell Sue, PT [facility]

## 2021-12-16 ENCOUNTER — HOSPITAL ENCOUNTER (OUTPATIENT)
Dept: PHYSICAL THERAPY | Age: 81
Discharge: HOME | End: 2021-12-16
Payer: COMMERCIAL

## 2021-12-16 PROCEDURE — 97110 THERAPEUTIC EXERCISES: CPT

## 2021-12-16 PROCEDURE — 97140 MANUAL THERAPY 1/> REGIONS: CPT

## 2021-12-16 NOTE — PROGRESS NOTES
Mi Black  : 1940  Primary: [facility] [employer] [facility]  Secondary:  [address] @ [place] [address] [address]  [phone]   [phone]      OUTPATIENT PHYSICAL THERAPY:  Daily Treatment Note  2021   ICD-10: Treatment Diagnosis: Pain in right wrist (M25.531), Stiffness of right wrist, not elsewhere classified (M25.631), Pain in left wrist (M25.532), Stiffness of left wrist, not elsewhere classified (M25.632), Stiffness of right hand, not elsewhere classified (M25.641) and Stiffness of left hand, not elsewhere classified (M25.642)  MEDICAL/REFERRING DIAGNOSIS:  Status post bilateral distal radius fractures   TREATMENT PLAN:  Effective Dates: 2021 TO 2021 (60 days). Frequency/Duration: 2 times a week for 60 Days  GOALS: (Goals have been discussed and agreed upon with patient.)  Short-Term Functional Goals: Time Frame: 4 weeks:   1. Independent performance of home exercise program (MET)  2. Reduce R hand edema to normal levels to allow normal finger to palm fist motions (Progressing)  3. Increase R wrist/hand ROM's to 75% or better of normal  In all planes of motion (NOT MET)  Discharge Goals: Time Frame: 12 weeks  1. Pt to demonstrate at least 35#   Strength L/R for normal weight carrying  2. Pt to demonstrate L/R wrist/ hand ROM's 85% or better of normal all planes   3. Improve Quick-DASH scores to 15/55 or better to reflect return to normal ADL function  Rehabilitation Potential For Stated Goals: Good  _________________________________________________________________________  Pre-treatment Symptoms/Complaints: Finger swelling and wrist pain are the big issues now. Hoping for more finger mobilitiy/strength in next few weeks so that she can return to driving.    Pain: Initial: 5/10 Post Session:  No increase/10   Medications Last Reviewed:  2021  Updated Objective Findings:    Observation/Orthostatic Postural Assessment:    Pt is wearing bilateral wrist splints. Palpation:    Diffuse right digital swelling, mild dorsal R hand swelling, no pitting (12/16/2021)  ROM:    Left : Pt demonstrates ability to perform full left hook, open and full fists. AROM's : wrist flexion = 40 deg, extension = 45 deg, pronation/supination 75% of full AROM, Ulnar deviation = 18 deg, radial deviation = 20 deg. Right: Passive Chitraladriana Montoya digit ROM's: 2-5 =  50% flexion with full extension at PIP/DIP/MCP's, MCP fleixon ~ 75 deg all. , Thumb IP = full extension, 50% flexion, MCP = full extension, 50% flexion. CMC: 25% flexion. CMC radial abduction 40 deg. Wrist flexion P/AROM = 40 deg, extension A/PROM = 35  Deg (12/9/2021), Ulnar deviation = 22 deg PROM, radial deviation = to ~15 deg. R forearm active/passive. pronation/supination  = ~ 60 deg to ~ 40 deg. 12/10/2021: Nuhopcbogt-fw-[id number] palmar crease measures: R hand: index: 5.8cm, middle: 6 cm,    11/30/2021:  ring = 6 cm, 5th finger: 3cm. Strength:   Left:  digits all 4-/5, Wrist flex/ext all 3+/5  Right: digits all 2+/5, wrist flex/ext 2+/5   TREATMENT:   THERAPEUTIC ACTIVITY: ( see below for minutes): Therapeutic activities per grid below to improve mobility, strength, balance and coordination. Required minimal visual, verbal, manual and tactile cues to improve independence and safety with daily activities . THERAPEUTIC EXERCISE: (see below for minutes):  Exercises per grid below to improve mobility, strength, balance and coordination. Required minimal verbal and manual cues to promote proper body alignment, promote proper body posture and promote proper body mechanics. Progressed resistance, range, repetitions and complexity of movement as indicated.   MANUAL THERAPY: (see below for minutes): Joint mobilization and Soft tissue mobilization was utilized and necessary because of the patient's restricted joint motion, painful spasm, loss of articular motion and restricted motion of soft tissue. MODALITIES: (see below for minutes):      to decrease pain    SELF CARE: (see below for minutes): Procedure(s) (per grid) utilized to improve and/or restore self-care/home management as related to dressing, bathing and grooming. Required minimal verbal cueing to facilitate activities of daily living skills and compensatory activities. Date: 12/9/2021 (visit 11) 12/10/2021 (visit 12) 12/13/2021 (visit 13) 12/16/2021 (visit 14)    Modalities:                                Therapeutic Exercise: 35 min 35 min 33 min 33 min     R wrist - passive extn. Stretch: 5 min R 2-3 MCP/PIP/DIP passive flexion stretch[de-identified] individual and combined: 12 min R:2nd-5th  MCP/PIP/DIP passive flexion/long hold stretches R:2nd-5th  MCP/PIP/DIP passive flexion/long hold stretches x 15 min     R 2-5 MCP passive flexion stretch[de-identified] individual and combined: 5 min Finger abduction 2nd/5th : manl reisst x 15  R finger abduction 2nd/5th: manual resist: 2 x 15 R wrist passive flexion stretch and extension stretches  X 6 min     R thumb to index finger pinches; yellow putty 15x R 2nd-5th finger extensions: manl resist R 2nd-5th Combined flexion manl resist: 20x Flexor tendon glides w/ manual resist: 15 each : hook, open and full fists     R radiocarpal ulnar deviation and radial dev. Passive stretching: 3 min R thumb to index opposition passive stretch; 5 min R thumb IP flexion passive stretch: 2 min Manl resist: wrist extension x 2 x 15     R fingers 2-5 full fist with manual resist - 15x Manual blocking at DIP x 15, PIP/DIP x 15: finger flexions Ball gripping (red): x 2 min Manl resist wrist flexion: 2 x 15. Manl resist R wrist flexion, extension 15 each.  Dowel gripping x 5\" x 3 min R tthumb extension manl resist x 15 R thumb IP flexion stretch holds x 2 min     R 2-3, 3-4, 4-5 adduction w/ yellow theraputty resist: 10 each [place] pickups x 10 - 3 jaw kathrin R thumb to index finger pinches; yellow putty 15x HEP review R wrist extn passive stretch: 2 min        R forearm supination passive stretch: 2 min                             Proprioceptive Activities:                                Manual Therapy: 10 min 10 min 12 min 12 min     R wrist: gr.3-4 distraction, palmar glides R index/middle finger and dorsal hand edema massage R 2nd - 5th individual digit and  dorsal R hand edema massage: 10 min R 2nd - 5th individual digit massage to decrese swellin min     R 2nd and 3rd MCP palmar glides. R 2nd MCP distraction gr. 3 R distal radio-ulnar ap glides. Gr. 3 R radiocarpal distraction, palmar glides gr. 3-4: 4 min                    Therapeutic Activities:                                  HEP: Exercises  Access Code: [email or web address]  URL: [email or web address]  Date: 2021  Prepared by: Gabriel Crawley    Exercises  Seated Wrist Extension PROM - 2 x daily - 7 x weekly - 5 reps - 15 second hold  Seated Wrist Prayer Stretch - 2 x daily - 7 x weekly - 5 sets - 15 second hold  Seated Wrist Flexion with Overpressure - 2 x daily - 7 x weekly - 10 reps - 15 second hold  Seated Finger Composite Flexion Stretch - 2 x daily - 7 x weekly - 3 reps - 2 minuessccond hold  Wrist Pronation Stretch - 2 x daily - 7 x weekly - 5 sets - 15 second hold  Seated Wrist Supination Stretch - 2 x daily - 7 x weekly - 5 sets - 15 second hold    Added 12/10/2021: Emphasize 5 to 10 min holds on steady finger flexion stretch (MCP/IP to palm). OK to work on thumb-finger pinch using clothespin squeeze. [employer] Portal  Treatment/Session Summary:    · Response to Treatment: Slowly responding ROM , but hand edema is greatly decreased. · Communication/Consultation:  None today  · Equipment provided today:  None today  · Recommendations/Intent for next treatment session: Next visit will focus on P/AA/AROM R and L wrists, scar massage /mobilization, Progressive strength activities.  Edema massage and forearm wrist mobilization    Total Treatment Billable Duration:  45 min  PT Patient Time In/Time Out  Time In: 1102  Time Out: 1717 [facility], PT    Future Appointments   Date Time Provider Kamille Reis   12[facility]17/2021 11:00 AM [facility]   12/20/2021 11:45 AM [facility]oxi Ruano, PT [facility]   12/22/2021 11:45 AM Roxi Ruano, PT [facility]   12/29/2021 11:45 AM Ingrid Brinks, PT [facility]   1/3/2022 11:00 AM Ingrid Brinks, PT [facility]   1/5/2022 11:00 AM Ingrid Brinks, PT [facility]   1/7/2022 11:00 AM Ingrid Brinks, PT [facility]   1/10/2022 11:00 AM Ingrid Brinks, PT [facility]   1/12/2022 11:00 AM Ingrid Brinks, PT [facility]   1/14/2022 11:00 AM Ingrid Brinks, PT [facility]   1/17/2022 11:00 AM Ingrid Brinks, PT [facility]   1/19/2022 11:00 AM Ingrid Brinks, PT [facility]   1/21/2022 11:00 AM [facility]   1/24/2022 11:00 AM [facility]   1/26/2022 11:00 AM [facility]   1/28/2022 11:00 AM [facility]   1/31/2022 11:00 AM Roxi Barragan, PT [facility]

## 2021-12-17 ENCOUNTER — HOSPITAL ENCOUNTER (OUTPATIENT)
Dept: PHYSICAL THERAPY | Age: 81
Discharge: HOME | End: 2021-12-17
Payer: COMMERCIAL

## 2021-12-17 PROCEDURE — 97110 THERAPEUTIC EXERCISES: CPT

## 2021-12-17 PROCEDURE — 97014 ELECTRIC STIMULATION THERAPY: CPT

## 2021-12-17 PROCEDURE — 97018 PARAFFIN BATH THERAPY: CPT

## 2021-12-17 NOTE — PROGRESS NOTES
Olga Mandeep Black  : 1940  Primary: [facility] [employer]  Secondary:  [address] @ [address] Attila YUKO Farias.  [phone]   [phone]      OUTPATIENT PHYSICAL THERAPY:  Daily Treatment Note  2021   ICD-10: Treatment Diagnosis: Pain in right wrist (M25.531), Stiffness of right wrist, not elsewhere classified (M25.631), Pain in left wrist (M25.532), Stiffness of left wrist, not elsewhere classified (M25.632), Stiffness of right hand, not elsewhere classified (M25.641) and Stiffness of left hand, not elsewhere classified (M25.642)  MEDICAL/REFERRING DIAGNOSIS:  Status post bilateral distal radius fractures   TREATMENT PLAN:  Effective Dates: 2021 TO 2021 (60 days). Frequency/Duration: 2 times a week for 60 Days  GOALS: (Goals have been discussed and agreed upon with patient.)  Short-Term Functional Goals: Time Frame: 4 weeks:   1. Independent performance of home exercise program (MET)  2. Reduce R hand edema to normal levels to allow normal finger to palm fist motions (Progressing)  3. Increase R wrist/hand ROM's to 75% or better of normal  In all planes of motion (NOT MET)  Discharge Goals: Time Frame: 12 weeks  1. Pt to demonstrate at least 35#   Strength L/R for normal weight carrying  2. Pt to demonstrate L/R wrist/ hand ROM's 85% or better of normal all planes   3. Improve Quick-DASH scores to 15/55 or better to reflect return to normal ADL function  Rehabilitation Potential For Stated Goals: Good  _________________________________________________________________________  Pre-treatment Symptoms/Complaints: Dorsal hand swells when she tries to type. Her goals are to be able to write again, hold her camera, drive herself.     Pain: Initial: 5/10 Post Session:  No increase/10   Medications Last Reviewed:  2021  Updated Objective Findings:    Observation/Orthostatic Postural Assessment:    Pt is wearing bilateral wrist splints. Palpation:    Diffuse right digital swelling, mild dorsal R hand swelling, no pitting (12/16/2021)  ROM:    Left : Pt demonstrates ability to perform full left hook, open and full fists. AROM's : wrist flexion = 40 deg, extension = 45 deg, pronation/supination 75% of full AROM, Ulnar deviation = 18 deg, radial deviation = 20 deg. Right: Passive Ulysees David digit ROM's: 2-5 =  50% flexion with full extension at PIP/DIP/MCP's, MCP fleixon ~ 75 deg all. , Thumb IP = full extension, 50% flexion, MCP = full extension, 50% flexion. CMC: 25% flexion. CMC radial abduction 40 deg. Wrist flexion P/AROM = 40 deg, extension A/PROM = 35  Deg (12/9/2021), Ulnar deviation = 22 deg PROM, radial deviation = to ~15 deg. R forearm active/passive. pronation/supination  = ~ 60 deg to 60 deg (12/17/2021). 12/10/2021: Avpcpglqoe-wt-wyrqnnkp palmar crease measures: R hand: index: 5.8cm, middle: 6 cm,    11/30/2021:  ring = 6 cm, 5th finger: 3cm. Strength:   Left:  digits all 4-/5, Wrist flex/ext all 3+/5  Right: digits all 2+/5, wrist flex/ext 2+/5   TREATMENT:   THERAPEUTIC ACTIVITY: ( see below for minutes): Therapeutic activities per grid below to improve mobility, strength, balance and coordination. Required minimal visual, verbal, manual and tactile cues to improve independence and safety with daily activities . THERAPEUTIC EXERCISE: (see below for minutes):  Exercises per grid below to improve mobility, strength, balance and coordination. Required minimal verbal and manual cues to promote proper body alignment, promote proper body posture and promote proper body mechanics. Progressed resistance, range, repetitions and complexity of movement as indicated.   MANUAL THERAPY: (see below for minutes): Joint mobilization and Soft tissue mobilization was utilized and necessary because of the patient's restricted joint motion, painful spasm, loss of articular motion and restricted motion of soft tissue. MODALITIES: (see below for minutes):      to decrease pain    SELF CARE: (see below for minutes): Procedure(s) (per grid) utilized to improve and/or restore self-care/home management as related to dressing, bathing and grooming. Required minimal verbal cueing to facilitate activities of daily living skills and compensatory activities. Date: 12/9/2021 (visit 11) 12/10/2021 (visit 12) 12/13/2021 (visit 13) 12/16/2021 (visit 14) 12/17/2021 (visit 15)   Modalities:     15 min        Paraffin dip R hand: 15 min                   Therapeutic Exercise: 35 min 35 min 33 min 33 min 30 min    R wrist - passive extn. Stretch: 5 min R 2-3 MCP/PIP/DIP passive flexion stretch[de-identified] individual and combined: 12 min R:2nd-5th  MCP/PIP/DIP passive flexion/long hold stretches R:2nd-5th  MCP/PIP/DIP passive flexion/long hold stretches x 15 min R : 2nd-3rd MCP/PIP/DIP, passive flexion, long hold stretches: 10 min    R 2-5 MCP passive flexion stretch[de-identified] individual and combined: 5 min Finger abduction 2nd/5th : manl reisst x 15  R finger abduction 2nd/5th: manual resist: 2 x 15 R wrist passive flexion stretch and extension stretches  X 6 min R wrist passive  extension stretche  X 5 min    R thumb to index finger pinches; yellow putty 15x R 2nd-5th finger extensions: manl resist R 2nd-5th Combined flexion manl resist: 20x Flexor tendon glides w/ manual resist: 15 each : hook, open and full fists Flexor tendon glides w/ manl joint blocking: 15 each : hook, open and full fists    R radiocarpal ulnar deviation and radial dev. Passive stretching: 3 min R thumb to index opposition passive stretch; 5 min R thumb IP flexion passive stretch: 2 min Manl resist: wrist extension x 2 x 15 Blue ball gripping x 2 min    R fingers 2-5 full fist with manual resist - 15x Manual blocking at DIP x 15, PIP/DIP x 15: finger flexions Ball gripping (red): x 2 min Manl resist wrist flexion: 2 x 15.   Passive R ulnar/radial deviation end ROM/stretching: 3 min    Manl resist R wrist flexion, extension 15 each. Dowel gripping x 5\" x 3 min R tthumb extension manl resist x 15 R thumb IP flexion stretch holds x 2 min R supination AROM x 2 min    R 2-3, 3-4, 4-5 adduction w/ yellow theraputty resist: 10 each [place] pickups x 10 - 3 jaw kathrin R thumb to index finger pinches; yellow putty 15x   Thumb to index pinch x 15, to middle finger pinch x 15 min     HEP review R wrist extn passive stretch: 2 min        R forearm supination passive stretch: 2 min                             Proprioceptive Activities:                                Manual Therapy: 10 min 10 min 12 min 12 min 10 min    R wrist: gr.3-4 distraction, palmar glides R index/middle finger and dorsal hand edema massage R 2nd - 5th individual digit and  dorsal R hand edema massage: 10 min R 2nd - 5th individual digit massage to decrese swellin min R 2nd-3rd digit  edema massage: 5 min    R 2nd and 3rd MCP palmar glides. R 2nd MCP distraction gr. 3 R distal radio-ulnar ap glides. Gr. 3 R radiocarpal distraction, palmar glides gr. 3-4: 4 min R radiocarpal distraction and palmar glides gr. 3-4        R prox radiohumeral distraction gr. 3           Therapeutic Activities:                                  HEP: Exercises  Access Code: [email or web address]  URL: [email or web address]  Date: 2021  Prepared by: Radha Pardo    Exercises  Seated Wrist Extension PROM - 2 x daily - 7 x weekly - 5 reps - 15 second hold  Seated Wrist Prayer Stretch - 2 x daily - 7 x weekly - 5 sets - 15 second hold  Seated Wrist Flexion with Overpressure - 2 x daily - 7 x weekly - 10 reps - 15 second hold  Seated Finger Composite Flexion Stretch - 2 x daily - 7 x weekly - 3 reps - 2 minuessccond hold  Wrist Pronation Stretch - 2 x daily - 7 x weekly - 5 sets - 15 second hold  Seated Wrist Supination Stretch - 2 x daily - 7 x weekly - 5 sets - 15 second hold    Added 12/10/2021: Emphasize 5 to 10 min holds on steady finger flexion stretch (MCP/IP to palm). OK to work on thumb-finger pinch using clothespin squeeze. [facility]  Treatment/Session Summary:    · Response to Treatment: Slow responses with regaining total AROM with 2nd- 3rd finger flexion - edema hindering progress. · Communication/Consultation:  None today  · Equipment provided today:  None today  Recommendations/Intent for next treatment session: Trial of contrast bath at next treatment.  Will obtain elastic garment and try again to use elastic compression to gain better edema control     Total Treatment Billable Duration:  55 min  PT Patient Time In/Time Out  Time In: 1057  Time Out: Courtney Stringer 405, PT    Future Appointments   Date Time Provider Kamille Reis   12/20/2021 11:45 AM Dee Dee Tobar, PT [facility]   12/22/2021 11:45 AM [facility]   12/29/2021 11:45 AM [facility]   1/3/2022 11:00 AM Dee [facility]   1/5/2022 11:00 AM Dee [facility]   1/7/2022 11:00 AM Dee Dee [facility]   1/10/2022 11:00 AM Dee [facility]   1/12/2022 11:00 AM Dee [facility]   1/14/2022 11:00 AM Dee [facility]   1/17/2022 11:00 AM Dee [facility]   1/19/2022 11:00 AM [facility]   1/21/2022 11:00 AM Dee Dee Tobar, PT [facility]   1/24/2022 11:00 AM Dee Dee Tobar, PT [facility]   1/26/2022 11:00 AM Dee Dee Tobar, PT [facility]   1/28/2022 11:00 SHANEL Tobar, PT [facility]   1/31/2022 11:00 AM Kirk Barragan, PT [facility]

## 2021-12-20 ENCOUNTER — HOSPITAL ENCOUNTER (OUTPATIENT)
Dept: PHYSICAL THERAPY | Age: 81
Discharge: HOME | End: 2021-12-20
Payer: COMMERCIAL

## 2021-12-20 PROCEDURE — 97034 APP MDLTY 1+CNTRST BTH EA 15: CPT

## 2021-12-20 PROCEDURE — 97110 THERAPEUTIC EXERCISES: CPT

## 2021-12-20 PROCEDURE — 97140 MANUAL THERAPY 1/> REGIONS: CPT

## 2021-12-20 NOTE — PROGRESS NOTES
Mio Black  : 1940  Primary: [facility] [place] [facility]*  Secondary:  [address]  [phone]   [phone]      OUTPATIENT PHYSICAL THERAPY: Progress Report and Daily Treatment Note  2021   ICD-10: Treatment Diagnosis: Pain in right wrist (M25.531), Stiffness of right wrist, not elsewhere classified (M25.631), Pain in left wrist (M25.532), Stiffness of left wrist, not elsewhere classified (M25.632), Stiffness of right hand, not elsewhere classified (M25.641) and Stiffness of left hand, not elsewhere classified (M25.642)  MEDICAL/REFERRING DIAGNOSIS:  Status post bilateral distal radius fractures   TREATMENT PLAN:  Effective Dates: 2021 TO 2021 (60 days). Frequency/Duration: 2 times a week for 60 Days  GOALS: (Goals have been discussed and agreed upon with patient.)  Short-Term Functional Goals: Time Frame: 4 weeks:   1. Independent performance of home exercise program (MET)  2. Reduce R hand edema to normal levels to allow normal finger to palm fist motions (Progressing)  3. Increase R wrist/hand ROM's to 75% or better of normal  In all planes of motion (Progressing)  Discharge Goals: Time Frame: 12 weeks  1. Pt to demonstrate at least 35#   Strength L/R for normal weight carrying (progressing)  2. Pt to demonstrate L/R wrist/ hand ROM's 85% or better of normal all planes (progressing)  3. Improve Quick-DASH scores to 15/55 or better to reflect return to normal ADL function (progressing)  Rehabilitation Potential For Stated Goals: Good  _________________________________________________________________________  Pre-treatment Symptoms/Complaints: Pt states that she started pushing her self more over the weekend, is trying ot integrate using her right hand to help with as many activities as she can now.    Pain: Initial: 5/10 Post Session:  No increase/10   Medications Last Reviewed: 12/20/2021  Updated Objective Findings:    Observation/Orthostatic Postural Assessment:    Pt is wearing bilateral wrist splints. Palpation:    Diffuse right digital swelling, mild dorsal R hand swelling, no pitting (12/16/2021)  ROM:    Left : Pt demonstrates ability to perform full left hook, open and full fists. AROM's : wrist flexion = 40 deg, extension = 45 deg, pronation/supination 75% of full AROM, Ulnar deviation = 18 deg, radial deviation = 20 deg. Right: Passive Camryn Rubisai digit ROM's: 2-5 =  50% flexion with full extension at PIP/DIP/MCP's, MCP fleixon ~ 75 deg all. , Thumb IP = full extension, 50% flexion, MCP = full extension, 50% flexion. CMC: 25% flexion. CMC radial abduction 40 deg. Wrist flexion P/AROM = 40 deg, extension A/PROM = 35  Deg (12/9/2021), Ulnar deviation = 22 deg PROM, radial deviation = to ~15 deg. R forearm active/passive. pronation/supination  = ~ 60 deg to 75 deg (12/20/2021). 12/20/2021: Pvsuubpnqm-zp-qsrjnoxc palmar crease measures: R hand: index: 5.5cm, middle: 6 cm,      Strength:   Left:  digits all 4-/5, Wrist flex/ext all 3+/5  Right: digits all 2+/5, wrist flex/ext 2+/5   TREATMENT:   THERAPEUTIC ACTIVITY: ( see below for minutes): Therapeutic activities per grid below to improve mobility, strength, balance and coordination. Required minimal visual, verbal, manual and tactile cues to improve independence and safety with daily activities . THERAPEUTIC EXERCISE: (see below for minutes):  Exercises per grid below to improve mobility, strength, balance and coordination. Required minimal verbal and manual cues to promote proper body alignment, promote proper body posture and promote proper body mechanics. Progressed resistance, range, repetitions and complexity of movement as indicated.   MANUAL THERAPY: (see below for minutes): Joint mobilization and Soft tissue mobilization was utilized and necessary because of the patient's restricted joint motion, painful spasm, loss of articular motion and restricted motion of soft tissue. MODALITIES: (see below for minutes):      to decrease pain    SELF CARE: (see below for minutes): Procedure(s) (per grid) utilized to improve and/or restore self-care/home management as related to dressing, bathing and grooming. Required minimal verbal cueing to facilitate activities of daily living skills and compensatory activities. Date: 12/20/2021 (visit 16)       Modalities: 11 min        R hand contrast baths:  Hot/cold (minutes) = 3/1/3/1/2/1                       Therapeutic Exercise: 36 min        R: 2-3-4-5 MCP, PIP PROM/end range stretching individual and combined: 15 min        R wrist extension stretch w/ palm propped on ball: 4 min        Flexor tendon glides: open and full, w/ manl resist at fingertips: 15 each        Palm rolling over tennis ball x 2 min, med ball x 2 min        Supination R forearm  stretching w/ 10\" holds x 8         Tennis ball gripping x 20 x 5\"         R thumb adductions w/ manl. Resist x 30. Proprioceptive Activities:                                Manual Therapy: 8 min        Dorsal R 2-3-4-5  MCP STM to improve flexor tendon mobility x 4 min         Distal radius ap gliding to improve supination        2nd - 3rd MCP dorsal glides gr. 3-4               Therapeutic Activities:                                  HEP: Exercises  Access Code: [email or web address]  URL: [email or web address]  Date: 11/23/2021  Prepared by: Dina Nixon    Exercises  Seated Wrist Extension PROM - 2 x daily - 7 x weekly - 5 reps - 15 second hold  Seated Wrist Prayer Stretch - 2 x daily - 7 x weekly - 5 sets - 15 second hold  Seated Wrist Flexion with Overpressure - 2 x daily - 7 x weekly - 10 reps - 15 second hold  Seated Finger Composite Flexion Stretch - 2 x daily - 7 x weekly - 3 reps - 2 minuessccond hold  Wrist Pronation Stretch - 2 x daily - 7 x weekly - 5 sets - 15 second hold  Seated Wrist Supination Stretch - 2 x daily - 7 x weekly - 5 sets - 15 second hold    Added 12/10/2021: Emphasize 5 to 10 min holds on steady finger flexion stretch (MCP/IP to palm). OK to work on thumb-finger pinch using clothespin squeeze. [employer]  Treatment/Session Summary:    · Response to Treatment: Slow but steady gains in regaining flexor tendon gliding. Contrast baths introduced today to encourage further normalization of palmar/digital edema issues - first session today was helpful inthis regard and overall edema reduced to allow doral hand veins to be seen at end of treatments. · Communication/Consultation:  None today  · Equipment provided today:  None today  Recommendations/Intent for next treatment session: Emphasis on a/p/rrom to regain flexor tendon/fingerl flexion RODRIGEZ for normal /grasp. Continue contrast bath treatments.      Total Treatment Billable Duration:  55 min  PT Patient Time In/Time Out  Time In: 3653  Time Out: [address] [place] [address], PT    Future Appointments   Date Time Provider Kamille Reis   12/22/2021 11:45 AM [facility]   12/29/2021 11:45 AM Talib Gallagher, PT [facility]   1/3/2022 11:00 AM Emmit Men, PT [facility]   1/5/2022 11:00 AM Emmit Men, PT [facility]   1/7/2022 11:00 AM Emmit Men, PT [facility]   1/10/2022 11:00 AM Emmit Men, PT [facility]   1/12/2022 11:00 AM Emmit Men, PT [facility]   1/14/2022 11:00 AM Emmit Men, PT [facility]   1/17/2022 11:00 AM Emmit Men, PT [facility]   1/19/2022 11:00 AM Emmit Men, PT [facility]   1/21/2022 11:00 AM Emmit Men, PT [facility]   1/24/2022 11:00 AM Emmit Men, PT [facility]   1/26/2022 11:00 AM Emmit Men, PT [facility]   1/28/2022 11:00 AM Michelle Beasley PT [facility]   1/31/2022 11:00 AM Audrey Samaniego, PT [facility]

## 2021-12-22 ENCOUNTER — HOSPITAL ENCOUNTER (OUTPATIENT)
Dept: PHYSICAL THERAPY | Age: 81
Discharge: HOME | End: 2021-12-22
Payer: COMMERCIAL

## 2021-12-22 PROCEDURE — 97034 APP MDLTY 1+CNTRST BTH EA 15: CPT

## 2021-12-22 PROCEDURE — 97140 MANUAL THERAPY 1/> REGIONS: CPT

## 2021-12-22 PROCEDURE — 97110 THERAPEUTIC EXERCISES: CPT

## 2021-12-22 NOTE — PROGRESS NOTES
Olga Mandeep Black  : 1940  Primary: [facility]*  Secondary:  [address]  [phone]   [phone]      OUTPATIENT PHYSICAL THERAPY: Daily Treatment Note  2021   ICD-10: Treatment Diagnosis: Pain in right wrist (M25.531), Stiffness of right wrist, not elsewhere classified (M25.631), Pain in left wrist (M25.532), Stiffness of left wrist, not elsewhere classified (M25.632), Stiffness of right hand, not elsewhere classified (M25.641) and Stiffness of left hand, not elsewhere classified (M25.642)  MEDICAL/REFERRING DIAGNOSIS:  Status post bilateral distal radius fractures   TREATMENT PLAN:  Effective Dates: 2021 TO 2021 (60 days). Frequency/Duration: 2 times a week for 60 Days  GOALS: (Goals have been discussed and agreed upon with patient.)  Short-Term Functional Goals: Time Frame: 4 weeks:   1. Independent performance of home exercise program (MET)  2. Reduce R hand edema to normal levels to allow normal finger to palm fist motions (Progressing)  3. Increase R wrist/hand ROM's to 75% or better of normal  In all planes of motion (Progressing)  Discharge Goals: Time Frame: 12 weeks  1. Pt to demonstrate at least 35#   Strength L/R for normal weight carrying (progressing)  2. Pt to demonstrate L/R wrist/ hand ROM's 85% or better of normal all planes (progressing)  3. Improve Quick-DASH scores to 15/55 or better to reflect return to normal ADL function (progressing)  Rehabilitation Potential For Stated Goals: Good  _________________________________________________________________________  Pre-treatment Symptoms/Complaints: Pt states that  Her hand feels about the same, her motion is improved during therapy sessiosn but she has difficulty replicating this motion when at home, hand becomes more stiff. Swelling continues to decrease incrementally each day.    Pain: Initial: 5/10 Post Session:  No increase/10   Medications Last Reviewed:  12/22/2021  Updated Objective Findings:    Observation/Orthostatic Postural Assessment:    Pt is wearing bilateral wrist splints. Palpation:    Diffuse right digital swelling, mild dorsal R hand swelling, no pitting (12/16/2021)  ROM:    Left : Pt demonstrates ability to perform full left hook, open and full fists. AROM's : wrist flexion = 40 deg, extension = 45 deg, pronation/supination 75% of full AROM, Ulnar deviation = 18 deg, radial deviation = 20 deg. Right: Passive Meenu Rasher digit ROM's: 2: passive RODRIGEZ now 75%. (12/22/2021), MCP fleixon ~ 75 deg all. , Thumb IP = full extension, 50% flexion, MCP = full extension, 50% flexion. CMC: 25% flexion. CMC radial abduction 40 deg. Wrist flexion P/AROM = 40 deg, extension A/PROM = 35  Deg (12/9/2021), Ulnar deviation = 22 deg PROM, radial deviation = to ~15 deg. R forearm active/passive. pronation/supination  = ~ 60 deg to 75 deg (12/20/2021). 12/20/2021: Xvorohncqp-og-tinszzci palmar crease measures: R hand: index: 5.5cm, middle: 6 cm,      Strength:   Left:  digits all 4-/5, Wrist flex/ext all 3+/5  Right: digits all 2+/5, wrist flex/ext 2+/5   TREATMENT:   THERAPEUTIC ACTIVITY: ( see below for minutes): Therapeutic activities per grid below to improve mobility, strength, balance and coordination. Required minimal visual, verbal, manual and tactile cues to improve independence and safety with daily activities . THERAPEUTIC EXERCISE: (see below for minutes):  Exercises per grid below to improve mobility, strength, balance and coordination. Required minimal verbal and manual cues to promote proper body alignment, promote proper body posture and promote proper body mechanics. Progressed resistance, range, repetitions and complexity of movement as indicated.   MANUAL THERAPY: (see below for minutes): Joint mobilization and Soft tissue mobilization was utilized and necessary because of the patient's restricted joint motion, painful spasm, loss of articular motion and restricted motion of soft tissue. MODALITIES: (see below for minutes):      to decrease pain    SELF CARE: (see below for minutes): Procedure(s) (per grid) utilized to improve and/or restore self-care/home management as related to dressing, bathing and grooming. Required minimal verbal cueing to facilitate activities of daily living skills and compensatory activities. Date: 12/20/2021 (visit 16) 12/22/2021(visit 17)      Modalities: 11 min 9 min       R hand contrast baths:  Hot/cold (minutes) = 3/1/3/1/2/1 R hand contrast bath: hot/cold (minutes) 3/1/3/2                      Therapeutic Exercise: 36 min 32 min       R: 2-3-4-5 MCP, PIP PROM/end range stretching individual and combined: 15 min R 2-3 PIP/MCP/DIP end range stretching: individual and combined for RODRIGEZ       R wrist extension stretch w/ palm propped on ball: 4 min R wrist passive extension stretching : 5 min       Flexor tendon glides: open and full, w/ manl resist at fingertips: 15 each Flexor tendon glides w/ DIP blocking x 20       Palm rolling over tennis ball x 2 min, med ball x 2 min Wrist extn curls holding tennis ball x 20       Supination R forearm  stretching w/ 10\" holds x 8  Wrist extn/supination w/ 1# x 20       Tennis ball gripping x 20 x 5\"  R thumb/index spring clip squeezes        R thumb adductions w/ manl. Resist x 30. Proprioceptive Activities:                                Manual Therapy: 8 min 8 min       Dorsal R 2-3-4-5  MCP STM to improve flexor tendon mobility x 4 min  Dorsal R 2/3 MCP STM/milking massage        Distal radius ap gliding to improve supination Distal radiocarpal plantar glides. Gr. 3       2nd - 3rd MCP dorsal glides gr. 3-4               Therapeutic Activities:                                  HEP: Exercises  Access Code: [email or web address]  URL: [email or web address]  Date: 11/23/2021  Prepared by: Keisha Callejas    Exercises  Seated Wrist Extension PROM - 2 x daily - 7 x weekly - 5 reps - 15 second hold  Seated Wrist Prayer Stretch - 2 x daily - 7 x weekly - 5 sets - 15 second hold  Seated Wrist Flexion with Overpressure - 2 x daily - 7 x weekly - 10 reps - 15 second hold  Seated Finger Composite Flexion Stretch - 2 x daily - 7 x weekly - 3 reps - 2 minuessccond hold  Wrist Pronation Stretch - 2 x daily - 7 x weekly - 5 sets - 15 second hold  Seated Wrist Supination Stretch - 2 x daily - 7 x weekly - 5 sets - 15 second hold    Added 12/10/2021: Emphasize 5 to 10 min holds on steady finger flexion stretch (MCP/IP to palm). OK to work on thumb-finger pinch using clothespin squeeze. [employer]  Treatment/Session Summary:    · Response to Treatment: Able to finally passively oppose index finger past the thumb tip for tip to tip pinch. · Communication/Consultation:  None today  · Equipment provided today:  None today  Recommendations/Intent for next treatment session: Emphasis on a/p/rrom to regain flexor tendon/fingerl flexion RODRIGEZ for normal /grasp. Continue contrast bath treatments.      Total Treatment Billable Duration:  49 min  PT Patient Time In/Time Out  Time In: 0528  Time Out: [address] [place] [address], PT    Future Appointments   Date Time Provider Kamille Reis   12/29/2021 11:45 AM Mele Gutierrez, PT [facility]   1/3/2022 11:00 AM Mele Gutierrez, PT [facility]   1/5/2022 11:00 AM Mele Gutierrez, PT [facility]   1/7/2022 11:00 AM Mele Gutierrez, PT [facility]   1/10/2022 11:00 AM Mele Gutierrez, PT [facility]   1/12/2022 11:00 AM Mele Gutierrez, PT [facility]   1/14/2022 11:00 AM Mele Gutierrez, PT [facility]   1/17/2022 11:00 AM Mele Gutierrez, PT [facility]   1/19/2022 11:00 AM Mele Gutierrez, PT [facility]   1/21/2022 11:00 AM Russell Barragan, PT [facility] 1/24/2022 11:00 AM Cynthia Clancy, PT [facility]   1/26/2022 11:00 AM Cynthia Clancy, PT [facility]   1/28/2022 11:00 AM Cynthia Clancy, PT [facility]   1/31/2022 11:00 AM Ramesh Fox, PT [facility]

## 2021-12-29 ENCOUNTER — HOSPITAL ENCOUNTER (OUTPATIENT)
Dept: PHYSICAL THERAPY | Age: 81
Discharge: HOME | End: 2021-12-29
Payer: COMMERCIAL

## 2021-12-29 PROCEDURE — 97110 THERAPEUTIC EXERCISES: CPT

## 2021-12-29 PROCEDURE — 97140 MANUAL THERAPY 1/> REGIONS: CPT

## 2021-12-29 PROCEDURE — 97034 APP MDLTY 1+CNTRST BTH EA 15: CPT

## 2021-12-29 NOTE — PROGRESS NOTES
David Black  : 1940  Primary: [employer] [place]*  Secondary:  Ann Ashley @ [address]  [phone]   [phone]      OUTPATIENT PHYSICAL THERAPY: Progress Report/ Daily Treatment Note  2021   ICD-10: Treatment Diagnosis: Pain in right wrist (M25.531), Stiffness of right wrist, not elsewhere classified (M25.631), Pain in left wrist (M25.532), Stiffness of left wrist, not elsewhere classified (M25.632), Stiffness of right hand, not elsewhere classified (M25.641) and Stiffness of left hand, not elsewhere classified (M25.642)  MEDICAL/REFERRING DIAGNOSIS:  Status post bilateral distal radius fractures   TREATMENT PLAN:  Effective Dates: 2021 TO 2021 (60 days). Frequency/Duration: 2 times a week for 60 Days  GOALS: (Goals have been discussed and agreed upon with patient.)  Short-Term Functional Goals: Time Frame: 4 weeks:   1. Independent performance of home exercise program (MET)  2. Reduce R hand edema to normal levels to allow normal finger to palm fist motions (Progressing)  3. Increase R wrist/hand ROM's to 75% or better of normal  In all planes of motion (Progressing)  Discharge Goals: Time Frame: 12 weeks  1. Pt to demonstrate at least 35#   Strength L/R for normal weight carrying (progressing)  2. Pt to demonstrate L/R wrist/ hand ROM's 85% or better of normal all planes (progressing)  3. Improve Quick-DASH scores to 15/55 or better to reflect return to normal ADL function (progressing)  Rehabilitation Potential For Stated Goals: Good  _________________________________________________________________________  Pre-treatment Symptoms/Complaints: Pt states that's she's able to flex her fingers more than before. Continued to work on her hand last week while away from PT with assist of a friend.    Pain: Initial: 10 Post Session:  No increase/10   Medications Last Reviewed: 12/29/2021  Updated Objective Findings:    Observation/Orthostatic Postural Assessment:    Pt is wearing bilateral wrist splints. Palpation:    Diffuse right digital swelling, mild dorsal R hand swelling (12/29/2021)  ROM:    Left : Pt demonstrates ability to perform full left hook, open and full fists. AROM's : wrist flexion = 40 deg, extension = 45 deg, pronation/supination 75% of full AROM, Ulnar deviation = 18 deg, radial deviation = 20 deg. Right: Passive Unique Hard digit ROM's: 2: passive RODRIGEZ now 75%. (12/22/2021), MCP flexion ~ 75 deg all. , Thumb IP = full extension, 50% flexion, MCP = full extension, 50% flexion. CMC: 25% flexion. CMC radial abduction 40 deg. Wrist flexion P/AROM = 40 deg, extension A/PROM = 35 deg/40 deg  Deg (12/29/2021), Ulnar deviation = 15 deg PROM, radial deviation = to 15 deg. R forearm active/passive. pronation/supination  = ~ 60 deg to 75 deg (12/29/2021). 12/20/2021: Yragmmhdfk-cm-evdezebn palmar crease measures: R hand: index: 5.5cm, middle: 6 cm,      Strength:   Left:  digits all 4-/5, Wrist flex/ext all 3+/5  Right: digits all 2+/5, wrist flex/ext 2+/5   TREATMENT:   THERAPEUTIC ACTIVITY: ( see below for minutes): Therapeutic activities per grid below to improve mobility, strength, balance and coordination. Required minimal visual, verbal, manual and tactile cues to improve independence and safety with daily activities . THERAPEUTIC EXERCISE: (see below for minutes):  Exercises per grid below to improve mobility, strength, balance and coordination. Required minimal verbal and manual cues to promote proper body alignment, promote proper body posture and promote proper body mechanics. Progressed resistance, range, repetitions and complexity of movement as indicated.   MANUAL THERAPY: (see below for minutes): Joint mobilization and Soft tissue mobilization was utilized and necessary because of the patient's restricted joint motion, painful spasm, loss of articular motion and restricted motion of soft tissue. MODALITIES: (see below for minutes):      to decrease pain    SELF CARE: (see below for minutes): Procedure(s) (per grid) utilized to improve and/or restore self-care/home management as related to dressing, bathing and grooming. Required minimal verbal cueing to facilitate activities of daily living skills and compensatory activities. Date: 12/20/2021 (visit 16) 12/22/2021(visit 17) 12/29/2021 (visit 18)     Modalities: 11 min 9 min 10 min      R hand contrast baths:  Hot/cold (minutes) = 3/1/3/1/2/1 R hand contrast bath: hot/cold (minutes) 3/1/3/2 R hand contrast bath: hot/cold: 3/1/2/1/2/1                     Therapeutic Exercise: 36 min 32 min 26 min      R: 2-3-4-5 MCP, PIP PROM/end range stretching individual and combined: 15 min R 2-3 PIP/MCP/DIP end range stretching: individual and combined for RODRIGEZ R 2-3-4-5 PIP/MCP/DIP and thumb IP/MCP end range stretching: individual and combined for RODRIGEZ: 15 min      R wrist extension stretch w/ palm propped on ball: 4 min R wrist passive extension stretching : 5 min R wist passive extension stretch 3 min      Flexor tendon glides: open and full, w/ manl resist at fingertips: 15 each Flexor tendon glides w/ DIP blocking x 20 R wrist supination stretch:3 min      Palm rolling over tennis ball x 2 min, med ball x 2 min Wrist extn curls holding tennis ball x 20 Manl resist: wrist flexion and wrist extn: 2 x 15 each      Supination R forearm  stretching w/ 10\" holds x 8  Wrist extn/supination w/ 1# x 20 Manl resist finger flexion curls: 15x      Tennis ball gripping x 20 x 5\"  R thumb/index spring clip squeezes        R thumb adductions w/ manl. Resist x 30.                                                  Proprioceptive Activities:                                Manual Therapy: 8 min 8 min 8 min      Dorsal R 2-3-4-5  MCP STM to improve flexor tendon mobility x 4 min  Dorsal R 2/3 MCP STM/milking massage  Dorsal R 2/3 MCP STM/milking massage      Distal radius ap gliding to improve supination Distal radiocarpal plantar glides. Gr. 3 Distal radiocarpal plantar glides. Gr. 3      2nd - 3rd MCP dorsal glides gr. 3-4               Therapeutic Activities:                                  HEP: Exercises  Access Code: [email or web address]  URL: [email or web address]  Date: 11/23/2021  Prepared by: Eliceo Jamison    Exercises  Seated Wrist Extension PROM - 2 x daily - 7 x weekly - 5 reps - 15 second hold  Seated Wrist Prayer Stretch - 2 x daily - 7 x weekly - 5 sets - 15 second hold  Seated Wrist Flexion with Overpressure - 2 x daily - 7 x weekly - 10 reps - 15 second hold  Seated Finger Composite Flexion Stretch - 2 x daily - 7 x weekly - 3 reps - 2 minuessccond hold  Wrist Pronation Stretch - 2 x daily - 7 x weekly - 5 sets - 15 second hold  Seated Wrist Supination Stretch - 2 x daily - 7 x weekly - 5 sets - 15 second hold    Added 12/10/2021: Emphasize 5 to 10 min holds on steady finger flexion stretch (MCP/IP to palm). OK to work on thumb-finger pinch using clothespin squeeze. [employer]  Treatment/Session Summary:    · Response to Treatment: Slow but consistent treatment to treatment progress in reducing hand edema and regaining total active motion throughout R hand digits. Communication/Consultation:  None today  · Equipment provided today:  None today  Recommendations/Intent for next treatment session: Emphasis on a/p/rrom to regain flexor tendon/finger flexion RODRIGEZ for normal /grasp. Continue contrast bath treatments.      Total Treatment Billable Duration:  44 min  PT Patient Time In/Time Out  Time In: 5140  Time Out: [address] [place] [address], PT    Future Appointments   Date Time Provider Kamille Reis   1/3/2022 11:00 AM Iftikhar Landin PT [facility]   1/5/2022 11:00 AM Iftikhar Landin PT [facility]   1/7/2022 11:00 AM HARLEY BloomOFLESLI [facility]   1/10/2022 11:00 AM Julio Gutierrez José Luis Cintron, PT [facility]   1/12/2022 11:00 AM Jean Garrett, PT [facility]   1/14/2022 11:00 AM Jean Garrett, PT [facility]   1/17/2022 11:00 AM Jean Garrett, PT [facility]   1/19/2022 11:00 AM Jean Garrett, PT [facility]   1/21/2022 11:00 AM Jean Garrett, PT [facility]   1/24/2022 11:00 AM Jean Garrett, PT [facility]   1/26/2022 11:00 [facility]   1/28/2022 11:00 AM [facility]   1/31/2022 11:00 AM Sophie Schwartz, José Luis Cintron, PT [facility]

## 2022-01-03 ENCOUNTER — HOSPITAL ENCOUNTER (OUTPATIENT)
Dept: PHYSICAL THERAPY | Age: 82
Discharge: HOME | End: 2022-01-03
Payer: COMMERCIAL

## 2022-01-03 PROCEDURE — 97110 THERAPEUTIC EXERCISES: CPT

## 2022-01-03 PROCEDURE — 97140 MANUAL THERAPY 1/> REGIONS: CPT

## 2022-01-03 NOTE — PROGRESS NOTES
Cindy Black  : 1940  Primary: [facility] [employer] [place]*  Secondary:  [address] @ [place] [address].  [phone]   [phone]      OUTPATIENT PHYSICAL THERAPY:  Daily Treatment Note  1/3/2022   ICD-10: Treatment Diagnosis: Pain in right wrist (M25.531), Stiffness of right wrist, not elsewhere classified (M25.631), Pain in left wrist (M25.532), Stiffness of left wrist, not elsewhere classified (M25.632), Stiffness of right hand, not elsewhere classified (M25.641) and Stiffness of left hand, not elsewhere classified (M25.642)  MEDICAL/REFERRING DIAGNOSIS:  Status post bilateral distal radius fractures   TREATMENT PLAN:  Effective Dates: 2021 TO 2021 (60 days). Frequency/Duration: 2 times a week for 60 Days  GOALS: (Goals have been discussed and agreed upon with patient.)  Short-Term Functional Goals: Time Frame: 4 weeks:   1. Independent performance of home exercise program (MET)  2. Reduce R hand edema to normal levels to allow normal finger to palm fist motions (Progressing)  3. Increase R wrist/hand ROM's to 75% or better of normal  In all planes of motion (Progressing)  Discharge Goals: Time Frame: 12 weeks  1. Pt to demonstrate at least 35#   Strength L/R for normal weight carrying (progressing)  2. Pt to demonstrate L/R wrist/ hand ROM's 85% or better of normal all planes (progressing)  3. Improve Quick-DASH scores to 15/55 or better to reflect return to normal ADL function (progressing)  Rehabilitation Potential For Stated Goals: Good  _________________________________________________________________________  Pre-treatment Symptoms/Complaints: PT reporting thenar eminence pain. She's back to see her surgeon later this week. Function in fingers slowly improving.     Pain: Initial: 5/10 Post Session:  No increase/10   Medications Last Reviewed:  1/3/2022  Updated Objective Findings: Observation/Orthostatic Postural Assessment:    Pt is wearing bilateral wrist splints. Palpation:    Diffuse right digital swelling, mild dorsal R hand swelling (12/29/2021)  ROM:    Left : Pt demonstrates ability to perform full left hook, open and full fists. AROM's : wrist flexion = 40 deg, extension = 45 deg, pronation/supination 75% of full AROM, Ulnar deviation = 18 deg, radial deviation = 20 deg. Right: Passive [place] Bas[place] digit ROM's: 2: passive RODRIGEZ now 75%. (12/22/2021), MCP flexion ~ 75 deg all. , Thumb IP = full extension, 50% flexion, MCP = full extension, 50% flexion. CMC: 25% flexion. CMC radial abduction 40 deg. Wrist flexion P/AROM = 40 deg, extension A/PROM = 35 deg/40 deg  Deg (12/29/2021), Ulnar deviation = 15 deg PROM, radial deviation = to 15 deg. R forearm active/passive. pronation/supination  = ~ 60 deg to 75 deg (12/29/2021). 12/20/2021: Eohcibovtw-zv-pmckthcv palmar crease measures: R hand: index: 5.5cm, middle: 6 cm,      Strength:   Left:  digits all 4-/5, Wrist flex/ext all 3+/5  Right: digits all 2+/5, wrist flex/ext 2+/5   TREATMENT:   THERAPEUTIC ACTIVITY: ( see below for minutes): Therapeutic activities per grid below to improve mobility, strength, balance and coordination. Required minimal visual, verbal, manual and tactile cues to improve independence and safety with daily activities . THERAPEUTIC EXERCISE: (see below for minutes):  Exercises per grid below to improve mobility, strength, balance and coordination. Required minimal verbal and manual cues to promote proper body alignment, promote proper body posture and promote proper body mechanics. Progressed resistance, range, repetitions and complexity of movement as indicated.   MANUAL THERAPY: (see below for minutes): Joint mobilization and Soft tissue mobilization was utilized and necessary because of the patient's restricted joint motion, painful spasm, loss of articular motion and restricted motion of soft tissue. MODALITIES: (see below for minutes):      to decrease pain    SELF CARE: (see below for minutes): Procedure(s) (per grid) utilized to improve and/or restore self-care/home management as related to dressing, bathing and grooming. Required minimal verbal cueing to facilitate activities of daily living skills and compensatory activities. Date: 12/20/2021 (visit 16) 12/22/2021(visit 17) 12/29/2021 (visit 18) 1/3/2022 (visit 19)    Modalities: 11 min 9 min 10 min      R hand contrast baths:  Hot/cold (minutes) = 3/1/3/1/2/1 R hand contrast bath: hot/cold (minutes) 3/1/3/2 R hand contrast bath: hot/cold: 3/1/2/1/2/1                     Therapeutic Exercise: 36 min 32 min 26 min 36 min     R: 2-3-4-5 MCP, PIP PROM/end range stretching individual and combined: 15 min R 2-3 PIP/MCP/DIP end range stretching: individual and combined for RODRIGEZ R 2-3-4-5 PIP/MCP/DIP and thumb IP/MCP end range stretching: individual and combined for RODRIGEZ: 15 min Coban wrapping index and middle fingers x 5 min for edema reduction     R wrist extension stretch w/ palm propped on ball: 4 min R wrist passive extension stretching : 5 min R wist passive extension stretch 3 min R 2-3-4 -5 and thumb: IP and MCP joint manual end range stretching flexion - individual joint and RODRIGEZ: 20 min     Flexor tendon glides: open and full, w/ manl resist at fingertips: 15 each Flexor tendon glides w/ DIP blocking x 20 R wrist supination stretch:3 min R wrist - supination stretch : 2 min     Palm rolling over tennis ball x 2 min, med ball x 2 min Wrist extn curls holding tennis ball x 20 Manl resist: wrist flexion and wrist extn: 2 x 15 each Manl resist: hook and open fist : 2 x 15     Supination R forearm  stretching w/ 10\" holds x 8  Wrist extn/supination w/ 1# x 20 Manl resist finger flexion curls: 15x Manl resist: wrist flexion (2 x 15), extension, radial and ulnar deviation.  15x     Tennis ball gripping x 20 x 5\"  R thumb/index spring clip squeezes Thumb to index yellow putting pinches x 10     R thumb adductions w/ manl. Resist x 30. Proprioceptive Activities:                                Manual Therapy: 8 min 8 min 8 min 8 min     Dorsal R 2-3-4-5  MCP STM to improve flexor tendon mobility x 4 min  Dorsal R 2/3 MCP STM/milking massage  Dorsal R 2/3 MCP STM/milking massage Scar massage lateral wrist 3 min     Distal radius ap gliding to improve supination Distal radiocarpal plantar glides. Gr. 3 Distal radiocarpal plantar glides. Gr. 3 Distal radiocarpal distraction, plantar glides gr. 3-4, intercarpal glides gr. 3-4     2nd - 3rd MCP dorsal glides gr. 3-4               Therapeutic Activities:                                  HEP: Exercises  Exercises  Seated Wrist Extension PROM - 2 x daily - 7 x weekly - 5 reps - 15 second hold  Seated Wrist Prayer Stretch - 2 x daily - 7 x weekly - 5 sets - 15 second hold  Seated Wrist Flexion with Overpressure - 2 x daily - 7 x weekly - 10 reps - 15 second hold  Seated Finger Composite Flexion Stretch - 2 x daily - 7 x weekly - 3 reps - 2 minuessccond hold  Wrist Pronation Stretch - 2 x daily - 7 x weekly - 5 sets - 15 second hold  Seated Wrist Supination Stretch - 2 x daily - 7 x weekly - 5 sets - 15 second hold    Added 12/10/2021: Emphasize 5 to 10 min holds on steady finger flexion stretch (MCP/IP to palm). OK to work on thumb-finger pinch using clothespin squeeze. [employer]  Treatment/Session Summary:    · Response to Treatment: Combined/RODRIGEZ slowly increasing, with changes noted at PIP's,especially 4th/5th digits. Not able to oppose thumb to 3rd-5th digits yet. ·  Communication/Consultation:  None today  · Equipment provided today:  None today  Recommendations/Intent for next treatment session: Emphasis on a/p/rrom to regain flexor tendon/finger flexion RODRIGEZ for normal /grasp. Resume contrast bath treatments.      Total Treatment Billable Duration:  44 min  PT Patient Time In/Time Out  Time In: 1100  Time Out: 250 [facility], PT    Future Appointments   Date Time Provider Kamille Reis   1/5/2022 11:00 AM Terri Maple, PT [facility]   1/7/2022 11:00 AM [facility]   1/10/2022 11:00 AM Terri Maple, [facility]   1/12/2022 11:00 AM Terri Maple, PT [facility]   1/14/2022 11:00 AM Terri Maple, PT [facility]   1/17/2022 11:00 AM Terri Maple, PT [facility]   1/19/2022 11:00 AM Terri Maple, PT [facility]   1/21/2022 11:00 AM Terri Maple, PT [facility]   1/24/2022 11:00 AM Terri Maple, PT [facility]   1/26/2022 11:00 AM Terri Maple, PT [facility]   1/28/2022 11:00 AM Terri Maple, PT [facility]   1/31/2022 11:00 AM Hiwot Barragan, PT [facility]

## 2022-01-05 ENCOUNTER — HOSPITAL ENCOUNTER (OUTPATIENT)
Dept: PHYSICAL THERAPY | Age: 82
Discharge: HOME | End: 2022-01-05
Payer: COMMERCIAL

## 2022-01-05 PROCEDURE — 97140 MANUAL THERAPY 1/> REGIONS: CPT

## 2022-01-05 PROCEDURE — 97110 THERAPEUTIC EXERCISES: CPT

## 2022-01-05 NOTE — PROGRESS NOTES
Chris Black  : 1940  Primary: [facility]  Secondary:  Jennifer Mcnulty @ [address]  [phone]   [phone]      OUTPATIENT PHYSICAL THERAPY:  Daily Treatment Note  2022   ICD-10: Treatment Diagnosis: Pain in right wrist (M25.531), Stiffness of right wrist, not elsewhere classified (M25.631), Pain in left wrist (M25.532), Stiffness of left wrist, not elsewhere classified (M25.632), Stiffness of right hand, not elsewhere classified (M25.641) and Stiffness of left hand, not elsewhere classified (M25.642)  MEDICAL/REFERRING DIAGNOSIS:  Status post bilateral distal radius fractures   TREATMENT PLAN:  Effective Dates: 2021 TO 2021 (60 days). Frequency/Duration: 2 times a week for 60 Days  GOALS: (Goals have been discussed and agreed upon with patient.)  Short-Term Functional Goals: Time Frame: 4 weeks:   1. Independent performance of home exercise program (MET)  2. Reduce R hand edema to normal levels to allow normal finger to palm fist motions (Progressing)  3. Increase R wrist/hand ROM's to 75% or better of normal  In all planes of motion (Progressing)  Discharge Goals: Time Frame: 12 weeks  1. Pt to demonstrate at least 35#   Strength L/R for normal weight carrying (progressing)  2. Pt to demonstrate L/R wrist/ hand ROM's 85% or better of normal all planes (progressing)  3. Improve Quick-DASH scores to 15/55 or better to reflect return to normal ADL function (progressing)  Rehabilitation Potential For Stated Goals: Good  _________________________________________________________________________  Pre-treatment Symptoms/Complaints: Pt continues to perform regular active hand/finger ROM's , motion is slowly improving.  She experiences both  Left and right wrist pain (left medial, right lateral)   Pain: Initial: 5/10 Post Session:  No increase/10   Medications Last Reviewed: 1/5/2022  Updated Objective Findings:    Observation/Orthostatic Postural Assessment:    Pt is wearing bilateral wrist splints. Palpation:    Diffuse right digital swelling, mild dorsal R hand swelling (12/29/2021)  ROM:    Left : Pt demonstrates ability to perform full left hook, open and full fists. AROM's : wrist flexion = 40 deg, extension = 45 deg, pronation/supination 75% of full AROM, Ulnar deviation = 18 deg, radial deviation = 20 deg. Right: Passive Rogfrancisco javier Horsfall digit ROM's: 2: passive RODRIGEZ now 75%. (12/22/2021), MCP flexion ~ 75 deg all. , Thumb IP = full extension, 50% flexion, MCP = full extension, 50% flexion. CMC: 25% flexion. CMC radial abduction 40 deg. Wrist flexion P/AROM = 55 deg (1/5/2022), extension A/PROM = 40 deg/48 deg  Deg (1/5/22), Ulnar deviation = 15 deg PROM, radial deviation = to 15 deg. R forearm active/passive. pronation/supination  = ~ 60 deg to 75 deg (12/29/2021). 12/20/2021: [id number] palmar crease measures: R hand: index: 5.0cm (1/5/22), middle: 6 cm,      Strength:   Left:  digits all 4-/5, Wrist flex/ext all 3+/5  Right: digits all 2+/5, wrist flex/ext 2+/5   TREATMENT:   THERAPEUTIC ACTIVITY: ( see below for minutes): Therapeutic activities per grid below to improve mobility, strength, balance and coordination. Required minimal visual, verbal, manual and tactile cues to improve independence and safety with daily activities . THERAPEUTIC EXERCISE: (see below for minutes):  Exercises per grid below to improve mobility, strength, balance and coordination. Required minimal verbal and manual cues to promote proper body alignment, promote proper body posture and promote proper body mechanics. Progressed resistance, range, repetitions and complexity of movement as indicated.   MANUAL THERAPY: (see below for minutes): Joint mobilization and Soft tissue mobilization was utilized and necessary because of the patient's restricted joint motion, painful spasm, loss of articular motion and restricted motion of soft tissue. MODALITIES: (see below for minutes):      to decrease pain    SELF CARE: (see below for minutes): Procedure(s) (per grid) utilized to improve and/or restore self-care/home management as related to dressing, bathing and grooming. Required minimal verbal cueing to facilitate activities of daily living skills and compensatory activities.      Date: 12/20/2021 (visit 16) 12/22/2021(visit 17) 12/29/2021 (visit 18) 1/3/2022 (visit 19) 1/5/22 (visit 20)   Modalities: 11 min 9 min 10 min      R hand contrast baths:  Hot/cold (minutes) = 3/1/3/1/2/1 R hand contrast bath: hot/cold (minutes) 3/1/3/2 R hand contrast bath: hot/cold: 3/1/2/1/2/1                     Therapeutic Exercise: 36 min 32 min 26 min 36 min 34 min    R: 2-3-4-5 MCP, PIP PROM/end range stretching individual and combined: 15 min R 2-3 PIP/MCP/DIP end range stretching: individual and combined for RODRIGEZ R 2-3-4-5 PIP/MCP/DIP and thumb IP/MCP end range stretching: individual and combined for RODRIGEZ: 15 min Coban wrapping index and middle fingers x 5 min for edema reduction Coban wrapping index and middle fingers x 5 min for edema reduction    R wrist extension stretch w/ palm propped on ball: 4 min R wrist passive extension stretching : 5 min R wist passive extension stretch 3 min R 2-3-4 -5 and thumb: IP and MCP joint manual end range stretching flexion - individual joint and RODRIGEZ: 20 min R: 2-3 digits MCP, PIP/DIP and R thumb cmc/MCP/IP end range stretching: individual and RODRIGEZ, including thumb opposition to 5th: 20 min     Flexor tendon glides: open and full, w/ manl resist at fingertips: 15 each Flexor tendon glides w/ DIP blocking x 20 R wrist supination stretch:3 min R wrist - supination stretch : 2 min Manl resist at 2nd/3rd: flexion curls x 15    Palm rolling over tennis ball x 2 min, med ball x 2 min Wrist extn curls holding tennis ball x 20 Manl resist: wrist flexion and wrist extn: 2 x 15 each Manl resist: hook and open fist : 2 x 15 Manl resist elbow extn x 20    Supination R forearm  stretching w/ 10\" holds x 8  Wrist extn/supination w/ 1# x 20 Manl resist finger flexion curls: 15x Manl resist: wrist flexion (2 x 15), extension, radial and ulnar deviation. 15x Wrist 3 way (extension/flexion, radial deviation: 1# x 15 reps each    Tennis ball gripping x 20 x 5\"  R thumb/index spring clip squeezes   Thumb to index yellow putting pinches x 10 Forearm pron/sup w/ 1#: 15x    R thumb adductions w/ manl. Resist x 30. Elbow curls w 1# x 20                                           Proprioceptive Activities:                                Manual Therapy: 8 min 8 min 8 min 8 min 10  min    Dorsal R 2-3-4-5  MCP STM to improve flexor tendon mobility x 4 min  Dorsal R 2/3 MCP STM/milking massage  Dorsal R 2/3 MCP STM/milking massage Scar massage lateral wrist 3 min R 2nd/3rd PIP distraction gr. 3, 2ndd palmar glides gr. 3-4, R thumb MCP distraction gr. 3, wrist distraction gr. 3, palmar glides gr. 3-4    Distal radius ap gliding to improve supination Distal radiocarpal plantar glides. Gr. 3 Distal radiocarpal plantar glides. Gr. 3 Distal radiocarpal distraction, plantar glides gr. 3-4, intercarpal glides gr. 3-4     2nd - 3rd MCP dorsal glides gr.  3-4               Therapeutic Activities:                                  HEP: Exercises  Exercises  Seated Wrist Extension PROM - 2 x daily - 7 x weekly - 5 reps - 15 second hold  Seated Wrist Prayer Stretch - 2 x daily - 7 x weekly - 5 sets - 15 second hold  Seated Wrist Flexion with Overpressure - 2 x daily - 7 x weekly - 10 reps - 15 second hold  Seated Finger Composite Flexion Stretch - 2 x daily - 7 x weekly - 3 reps - 2 minuessccond hold  Wrist Pronation Stretch - 2 x daily - 7 x weekly - 5 sets - 15 second hold  Seated Wrist Supination Stretch - 2 x daily - 7 x weekly - 5 sets - 15 second hold    Added 12/10/2021: Emphasize 5 to 10 min holds on steady finger flexion stretch (MCP/IP to palm). OK to work on thumb-finger pinch using clothespin squeeze. 1/5/22: hand compression garments issued (incrediwear), wear as much as tolerated, including at night. [email or web address] Portal  Treatment/Session Summary:    · Response to Treatment: Gains today in index finger RODRIGEZ (5 mm increase), wrist flex/ext a/prom's increased as well, Able to advance into weighted training despite incomplete  ROM. ·  Communication/Consultation:  None today  · Equipment provided today:  None today  Recommendations/Intent for next treatment session: Emphasis on a/p/rrom to regain flexor tendon/finger flexion RODRIGEZ for normal /grasp, pronation/supination.      Total Treatment Billable Duration:  44 min  PT Patient Time In/Time Out  Time In: 1100  Time Out: 250 [facility],     Future Appointments   Date Time Provider Kamille Reis   1/7/2022 11:00 AM Clludya Gemma, PT [facility]   1/10/2022 11:00 AM [facility]   1/12/2022 11:00 AM Cly[facility] Conn, [facility]   1/14/2022 11:00 AM Clyda Conn, [facility]   1/17/2022 11:00 AM Clyda Conn, [facility]   1/19/2022 11:00 AM Clyda Conn, [facility]   1/21/2022 11:00 AM Clyda Conn, [facility]   1/24/2022 11:00 AM Clyda Conn, PT [facility]   1/26/2022 11:00 AM Clyda Conn, PT [facility]   1/28/2022 11:00 AM Kalebyda Conn, PT [facility]   1/31/2022 11:00 AM Steven Barragan, PT [facility]

## 2022-01-07 ENCOUNTER — HOSPITAL ENCOUNTER (OUTPATIENT)
Dept: PHYSICAL THERAPY | Age: 82
Discharge: HOME | End: 2022-01-07
Payer: COMMERCIAL

## 2022-01-07 PROCEDURE — 97110 THERAPEUTIC EXERCISES: CPT

## 2022-01-07 PROCEDURE — 97140 MANUAL THERAPY 1/> REGIONS: CPT

## 2022-01-07 NOTE — PROGRESS NOTES
Danis Black  : 1940  [facility]  Secondary:  Kiko Skaggs @ [place] [address].  [phone]   [phone]      OUTPATIENT PHYSICAL THERAPY:  Daily Treatment Note  2022   ICD-10: Treatment Diagnosis: Pain in right wrist (M25.531), Stiffness of right wrist, not elsewhere classified (M25.631), Pain in left wrist (M25.532), Stiffness of left wrist, not elsewhere classified (M25.632), Stiffness of right hand, not elsewhere classified (M25.641) and Stiffness of left hand, not elsewhere classified (M25.642)  MEDICAL/REFERRING DIAGNOSIS:  Status post bilateral distal radius fractures   TREATMENT PLAN:  Effective Dates: 2021 TO 2021 (60 days). Frequency/Duration: 2 times a week for 60 Days  GOALS: (Goals have been discussed and agreed upon with patient.)  Short-Term Functional Goals: Time Frame: 4 weeks:   1. Independent performance of home exercise program (MET)  2. Reduce R hand edema to normal levels to allow normal finger to palm fist motions (Progressing)  3. Increase R wrist/hand ROM's to 75% or better of normal  In all planes of motion (Progressing)  Discharge Goals: Time Frame: 12 weeks  1. Pt to demonstrate at least 35#   Strength L/R for normal weight carrying (progressing)  2. Pt to demonstrate L/R wrist/ hand ROM's 85% or better of normal all planes (progressing)  3. Improve Quick-DASH scores to 15/55 or better to reflect return to normal ADL function (progressing)  Rehabilitation Potential For Stated Goals: Good  _________________________________________________________________________  Pre-treatment Symptoms/Complaints: Pt reporting slow but steady improvement of ROM in hand. Tried manipulating a pen since last visit. Notes that her follow-up visit to her surgeon for today was postponed due to her driving having been ill.  Using her new compression garment is helping limit hand/finger swelling. Pain: Initial: 5/10 Post Session:  No increase/10   Medications Last Reviewed:  1/7/2022  Updated Objective Findings:    Observation/Orthostatic Postural Assessment:    Pt is wearing bilateral wrist splints. Palpation:    Diffuse right digital swelling, mild dorsal R hand swelling (12/29/2021)  ROM:    Left : Pt demonstrates ability to perform full left hook, open and full fists. AROM's : wrist flexion = 40 deg, extension = 45 deg, pronation/supination 75% of full AROM, Ulnar deviation = 18 deg, radial deviation = 20 deg. Right: Passive Gabriela Murders digit ROM's: 2: passive RODRIGEZ now 75%. (12/22/2021), MCP flexion ~ 75 deg all. , Thumb IP = full extension, 50% flexion, MCP = full extension, 50% flexion. CMC: 25% flexion. CMC radial abduction 40 deg. Wrist flexion P/AROM = 55 deg (1/5/2022), extension A/PROM = 40 deg/48 deg  Deg (1/5/22), Ulnar deviation = 15 deg PROM, radial deviation = to 15 deg. R forearm active/passive. pronation/supination  = ~ 60 deg to 75 deg (12/29/2021). 12/20/2021: [id number] palmar crease measures: R hand: index: 5.0cm (1/5/22), middle: 6 cm,  Strength:   Left:  digits all 4-/5, Wrist flex/ext all 3+/5  Right: digits all 2+/5, wrist flex/ext 2+/5   TREATMENT:   THERAPEUTIC ACTIVITY: ( see below for minutes): Therapeutic activities per grid below to improve mobility, strength, balance and coordination. Required minimal visual, verbal, manual and tactile cues to improve independence and safety with daily activities . THERAPEUTIC EXERCISE: (see below for minutes):  Exercises per grid below to improve mobility, strength, balance and coordination. Required minimal verbal and manual cues to promote proper body alignment, promote proper body posture and promote proper body mechanics. Progressed resistance, range, repetitions and complexity of movement as indicated.   MANUAL THERAPY: (see below for minutes): Joint mobilization and Soft tissue mobilization was utilized and necessary because of the patient's restricted joint motion, painful spasm, loss of articular motion and restricted motion of soft tissue. MODALITIES: (see below for minutes):      to decrease pain    SELF CARE: (see below for minutes): Procedure(s) (per grid) utilized to improve and/or restore self-care/home management as related to dressing, bathing and grooming. Required minimal verbal cueing to facilitate activities of daily living skills and compensatory activities. Date: 1/7/22 (visit 21)       Modalities:                                Therapeutic Exercise: 36 min        R 2-3-4 -5 and thumb: IP and MCP joint manual end range stretching flexion - individual joint and RODRIGEZ: 20 min        Light manl resist thumb to 5th opposition x 15 reps        Hook fist resisted 2nd-5th flexions: 2 x 15        [employer] web - hook fists 3 x 15, 2nd-5th finger adductions x 20        Manl resist: wrist extn, flexion, pronation, supination x 15 , radial dev. X 10 . R thumb to index putty pinches (brown putty) x 20                                                       Proprioceptive Activities:                                Manual Therapy: 8 min        Joint mobs: R 2nd and 3rd PIP /DIP distraction, PIP palmar glides, intercarpal glides - all gr.  3-4                               Therapeutic Activities:                                  HEP: Exercises  Exercises  Seated Wrist Extension PROM - 2 x daily - 7 x weekly - 5 reps - 15 second hold  Seated Wrist Prayer Stretch - 2 x daily - 7 x weekly - 5 sets - 15 second hold  Seated Wrist Flexion with Overpressure - 2 x daily - 7 x weekly - 10 reps - 15 second hold  Seated Finger Composite Flexion Stretch - 2 x daily - 7 x weekly - 3 reps - 2 minuessccond hold  Wrist Pronation Stretch - 2 x daily - 7 x weekly - 5 sets - 15 second hold  Seated Wrist Supination Stretch - 2 x daily - 7 x weekly - 5 sets - 15 second hold    Added 12/10/2021: Emphasize 5 to 10 min holds on steady finger flexion stretch (MCP/IP to palm). OK to work on thumb-finger pinch using clothespin squeeze. 1/5/22: hand compression garments issued (incrediwear), wear as much as tolerated, including at night. [employer] Portal  Treatment/Session Summary:    · Response to Treatment: Index finger RODRIGEZ now at 4.8 cm. Visible decrease in all digit edema since starting compression glove use last visit. · Communication/Consultation:  None today  · Equipment provided today:  None today   · Recommendations/Intent for next treatment session: Emphasis on a/p/rrom to regain flexor tendon/finger flexion RODRIGEZ for normal /grasp, pronation/supination.      Total Treatment Billable Duration:  44 min  PT Patient Time In/Time Out  Time In: 1120  Time Out: 620 [facility], PT    Future Appointments   Date Time Provider Kamille Reis   1/10/2022 11:00 AM Teetee Bella, PT [facility]   1/12/2022 11:00 AM Porfirioe Jena, PT [facility]   1/14/2022 11:00 AM Lonzie [place], PT [facility]   1/17/2022 11:00 AM Lonzie [place], PT [facility]   1/19/2022 11:00 AM [facility]ie [place], [facility]   1/21/2022 11:00 AM Lonzie [place], PT [facility]   1/24/2022 11:00 AM Porfirioe [place], PT [facility]   1/26/2022 11:00 AM Lonzie [place], PT [facility]   1/28/2022 11:00 AM Porfirioe [place], PT [facility]   1/31/2022 11:00 AM Figueroa Gilmore, PT [facility]

## 2022-01-10 ENCOUNTER — HOSPITAL ENCOUNTER (OUTPATIENT)
Dept: PHYSICAL THERAPY | Age: 82
Discharge: HOME | End: 2022-01-10
Payer: COMMERCIAL

## 2022-01-10 PROCEDURE — 97140 MANUAL THERAPY 1/> REGIONS: CPT

## 2022-01-10 PROCEDURE — 97110 THERAPEUTIC EXERCISES: CPT

## 2022-01-10 NOTE — THERAPY RECERTIFICATION
Tito Black  : 1940  Primary: [facility]iles[facility]   [place]*  Secondary:  [address] [place] [facility] @ [address]  [phone]   [phone]      OUTPATIENT PHYSICAL THERAPY:  Recertification/Daily Treatment Note  1/10/2022   ICD-10: Treatment Diagnosis: Pain in right wrist (M25.531), Stiffness of right wrist, not elsewhere classified (M25.631), Pain in left wrist (M25.532), Stiffness of left wrist, not elsewhere classified (M25.632), Stiffness of right hand, not elsewhere classified (M25.641) and Stiffness of left hand, not elsewhere classified (M25.642)  MEDICAL/REFERRING DIAGNOSIS:  Status post bilateral distal radius fractures   TREATMENT PLAN:  Effective Dates: 2022TO 3/2//2022 (60 days). Frequency/Duration: 2 times a week for 60 Days  GOALS: (Goals have been discussed and agreed upon with patient.)  Short-Term Functional Goals: Time Frame: 4 weeks:   1. Independent performance of home exercise program (MET)  2. Reduce R hand edema to normal levels to allow normal finger to palm fist motions (Progressing)  3. Increase R wrist/hand ROM's to 75% or better of normal  In all planes of motion (Progressing)  Discharge Goals: Time Frame: 12 weeks  1. Pt to demonstrate at least 35#   Strength L/R for normal weight carrying (progressing)  2. Pt to demonstrate L/R wrist/ hand ROM's 85% or better of normal all planes (progressing)  3. Improve Quick-DASH scores to 15/55 or better to reflect return to normal ADL function (progressing)  Rehabilitation Potential For Stated Goals: Good  Regarding Tito Black's therapy, I certify that the treatment plan above will be carried out by a therapist or under their direction.   Thank you for this referral,  Sada Goode, PT       Referring Physician Signature: Anastasia Pena MD                                            Date _________________________________________________________________________  Pre-treatment Symptoms/Complaints: Pt reporting slow but steady improvement of ROM in hand. Tried manipulating a pen since last visit. Notes that her follow-up visit to her surgeon for today was postponed due to her driving having been ill. Using her new compression garment is helping limit hand/finger swelling. Pain: Initial: 5/10 Post Session:  No increase/10   Medications Last Reviewed:  1/10/2022  Updated Objective Findings:    Observation/Orthostatic Postural Assessment:    Pt is wearing bilateral wrist splints. Palpation:    Diffuse right digital swelling, mild dorsal R hand swelling (12/29/2021)  ROM:    Left : Pt demonstrates ability to perform full left hook, open and full fists. AROM's : wrist flexion = 40 deg, extension = 45 deg, pronation/supination 75% of full AROM, Ulnar deviation = 18 deg, radial deviation = 20 deg. Right: Passive Jolly Bones digit ROM's: 2: passive RODRIGEZ now 75%. (12/22/2021), MCP flexion ~ 75 deg all. , Thumb IP = full extension, 50% flexion, MCP = full extension, 50% flexion. CMC: 25% flexion. CMC radial abduction 40 deg. Wrist flexion P/AROM = 55 deg (1/5/2022), extension A/PROM = 40 deg/48 deg  Deg (1/5/22), Ulnar deviation = 15 deg PROM, radial deviation = to 15 deg. R forearm active/passive. pronation/supination  = ~ 60 deg to 75 deg (12/29/2021). 1/10/22: Guszcyllda-ph-ydixbizo palmar crease measures: R hand: index: 4.6cm, middle: 5.8 cm,  Strength:   Left:  digits all 4-/5, Wrist flex/ext all 34/5  Right: digits all 3-/5, wrist flex/ext 4-/5   TREATMENT:   THERAPEUTIC ACTIVITY: ( see below for minutes): Therapeutic activities per grid below to improve mobility, strength, balance and coordination. Required minimal visual, verbal, manual and tactile cues to improve independence and safety with daily activities .   THERAPEUTIC EXERCISE: (see below for minutes):  Exercises per grid below to improve mobility, strength, balance and coordination. Required minimal verbal and manual cues to promote proper body alignment, promote proper body posture and promote proper body mechanics. Progressed resistance, range, repetitions and complexity of movement as indicated. MANUAL THERAPY: (see below for minutes): Joint mobilization and Soft tissue mobilization was utilized and necessary because of the patient's restricted joint motion, painful spasm, loss of articular motion and restricted motion of soft tissue. MODALITIES: (see below for minutes):      to decrease pain    SELF CARE: (see below for minutes): Procedure(s) (per grid) utilized to improve and/or restore self-care/home management as related to dressing, bathing and grooming. Required minimal verbal cueing to facilitate activities of daily living skills and compensatory activities. Date: 1/7/22 (visit 21) 1/10/22 (visit 22)      Modalities:                                Therapeutic Exercise: 36 min 30 min       R 2-3-4 -5 and thumb: IP and MCP joint manual end range stretching flexion - individual joint and RODRIGEZ: 20 min R thumb IP/MCP and digits 2-3 MCP/PIP/DIP end range flexion stretching: 15 min       Light manl resist thumb to 5th opposition x 15 reps Manl resist: combined finger flexion 2-5 3 x 15       Hook fist resisted 2nd-5th flexions: 2 x 15 Manl resist pron/sup R forearm 2 x 15       Green [employer] web - hook fists 3 x 15, 2nd-5th finger adductions x 20 Manl resist R wrist flex, R wrist ext: 2 x 10 each       Manl resist: wrist extn, flexion, pronation, supination x 15 , radial dev. X 10 . R thumb to index putty pinches (brown putty) x 20                                                       Proprioceptive Activities:                                Manual Therapy: 8 min 15 miin       Joint mobs: R 2nd and 3rd PIP /DIP distraction, PIP palmar glides, intercarpal glides - all gr. 3-4 Joint mobs:  Wrist distraction, palmar glide and intercarpal palmar glides - gr. 3-4, Distraction and palmar glides at thumb mcp, ip, digits 2-3 MCP,PIP, DIP's all gr. 3-4                              Therapeutic Activities:                                  HEP: Exercises  Exercises  Seated Wrist Extension PROM - 2 x daily - 7 x weekly - 5 reps - 15 second hold  Seated Wrist Prayer Stretch - 2 x daily - 7 x weekly - 5 sets - 15 second hold  Seated Wrist Flexion with Overpressure - 2 x daily - 7 x weekly - 10 reps - 15 second hold  Seated Finger Composite Flexion Stretch - 2 x daily - 7 x weekly - 3 reps - 2 minuessccond hold  Wrist Pronation Stretch - 2 x daily - 7 x weekly - 5 sets - 15 second hold  Seated Wrist Supination Stretch - 2 x daily - 7 x weekly - 5 sets - 15 second hold    Added 12/10/2021: Emphasize 5 to 10 min holds on steady finger flexion stretch (MCP/IP to palm). OK to work on thumb-finger pinch using clothespin squeeze. 1/5/22: hand compression garments issued ([employer]), wear as much as tolerated, including at night. [employer] Portal  Treatment/Session Summary:    · Response to Treatment: Slow but steady increases in digital total active motion with individual joint (MCP/IP) ROM's increasing. R 2nd and 3rd PIP flexion are the most limited, hindering full,hook and open fist creation and forceful  ability. · Communication/Consultation:  None today  · Equipment provided today:  None today   · Recommendations/Intent for next treatment session: Emphasis on a/p/rrom to regain flexor tendon/finger flexion RODRIGEZ for normal /grasp, pronation/supination.      Total Treatment Billable Duration:  45 min     Johnie Arevalo PT    Future Appointments   Date Time Provider Kamille Reis   1/10/2022 11:00 AM Sade Coto PT [facility]   1/12/2022 11:00 AM Sade Coto PT [facility]   1/14/2022 11:00 AM Sade Coto PT [facility]   1/17/2022 11:00 AM Sade Coto PT [facility]   1/19/2022 11:00 AM Dottie Cage, PT [facility]   1/21/2022 11:00 AM Dottie Cage, PT [facility]   1/24/2022 11:00 AM Dottie Cage, PT [facility]   1/26/2022 11:00 AM Dottie Cage, PT [facility]   1/28/2022 11:00 AM Dottie Cage, PT [facility]   1/31/2022 11:00 AM Quinton Munguia, PT [facility]

## 2022-01-12 ENCOUNTER — HOSPITAL ENCOUNTER (OUTPATIENT)
Dept: PHYSICAL THERAPY | Age: 82
Discharge: HOME | End: 2022-01-12
Payer: COMMERCIAL

## 2022-01-12 PROCEDURE — 97110 THERAPEUTIC EXERCISES: CPT

## 2022-01-12 PROCEDURE — 97140 MANUAL THERAPY 1/> REGIONS: CPT

## 2022-01-12 NOTE — PROGRESS NOTES
Bharati Black  : 1940  Primary: [facility]*  Secondary:  [address] [facility] @ [address] [place] [address].  [phone]   [phone]      OUTPATIENT PHYSICAL THERAPY:  Daily Treatment Note  2022   ICD-10: Treatment Diagnosis: Pain in right wrist (M25.531), Stiffness of right wrist, not elsewhere classified (M25.631), Pain in left wrist (M25.532), Stiffness of left wrist, not elsewhere classified (M25.632), Stiffness of right hand, not elsewhere classified (M25.641) and Stiffness of left hand, not elsewhere classified (M25.642)  MEDICAL/REFERRING DIAGNOSIS:  Status post bilateral distal radius fractures   TREATMENT PLAN:  Effective Dates: 2022TO 3/2//2022 (60 days). Frequency/Duration: 2 times a week for 60 Days  GOALS: (Goals have been discussed and agreed upon with patient.)  Short-Term Functional Goals: Time Frame: 4 weeks:   1. Independent performance of home exercise program (MET)  2. Reduce R hand edema to normal levels to allow normal finger to palm fist motions (Progressing)  3. Increase R wrist/hand ROM's to 75% or better of normal  In all planes of motion (Progressing)  Discharge Goals: Time Frame: 12 weeks  1. Pt to demonstrate at least 35#   Strength L/R for normal weight carrying (progressing)  2. Pt to demonstrate L/R wrist/ hand ROM's 85% or better of normal all planes (progressing)  3. Improve Quick-DASH scores to 15/55 or better to reflect return to normal ADL function (progressing)  Rehabilitation Potential For Stated Goals: Good  _______________________________________________________________________  Pre-treatment Symptoms/Complaints: Pt notes that inex finger flexion improving, but that 4th/5th finger flexion seem to be stuck, not flexing as well.   Pain: Initial: 5/10 Post Session:  No increase/10   Medications Last Reviewed:  2022  Updated Objective Findings:    Observation/Orthostatic Postural Assessment:    Pt is wearing bilateral wrist splints. Palpation:    Diffuse right digital swelling, mild dorsal R hand swelling (12/29/2021)  ROM:    Left : Pt demonstrates ability to perform full left hook, open and full fists. AROM's : wrist flexion = 40 deg, extension = 45 deg, pronation/supination 75% of full AROM, Ulnar deviation = 18 deg, radial deviation = 20 deg. Right: Passive Alois Mom digit ROM's: 2: passive RODRIGEZ now 75%. (12/22/2021), MCP flexion ~ 75 deg all. , Thumb IP = full extension, 50% flexion, MCP = full extension, 50% flexion. CMC: 25% flexion. CMC radial abduction 40 deg. Wrist flexion P/AROM = 55 deg (1/5/2022), extension A/PROM = 40 deg/48 deg  Deg (1/5/22), Ulnar deviation = 15 deg PROM, radial deviation = to 15 deg. R forearm active/passive. pronation/supination  = ~ 60 deg to 75 deg (12/29/2021). 1/10/22: Bbxolbdrpn-rs-vmheihzl palmar crease measures: R hand: index: 4.6cm, middle: 5.8 cm,  Strength:   Left:  digits all 4-/5, Wrist flex/ext all 34/5  Right: digits all 3-/5, wrist flex/ext 4-/5   TREATMENT:   THERAPEUTIC ACTIVITY: ( see below for minutes): Therapeutic activities per grid below to improve mobility, strength, balance and coordination. Required minimal visual, verbal, manual and tactile cues to improve independence and safety with daily activities . THERAPEUTIC EXERCISE: (see below for minutes):  Exercises per grid below to improve mobility, strength, balance and coordination. Required minimal verbal and manual cues to promote proper body alignment, promote proper body posture and promote proper body mechanics. Progressed resistance, range, repetitions and complexity of movement as indicated. MANUAL THERAPY: (see below for minutes): Joint mobilization and Soft tissue mobilization was utilized and necessary because of the patient's restricted joint motion, painful spasm, loss of articular motion and restricted motion of soft tissue.    MODALITIES: (see below for minutes):      to decrease pain    SELF CARE: (see below for minutes): Procedure(s) (per grid) utilized to improve and/or restore self-care/home management as related to dressing, bathing and grooming. Required minimal verbal cueing to facilitate activities of daily living skills and compensatory activities. Date: 1/7/22 (visit 21) 1/10/22 (visit 22) 1/12/22 (visit 23)     Modalities:                                Therapeutic Exercise: 36 min 30 min 27 min      R 2-3-4 -5 and thumb: IP and MCP joint manual end range stretching flexion - individual joint and RODRIGEZ: 20 min R thumb IP/MCP and digits 2-3 MCP/PIP/DIP end range flexion stretching: 15 min R thumb IP/MCP and digits 2-3-4-5 MCP/PIP/DIP end range flexion stretching: 15 min      Light manl resist thumb to 5th opposition x 15 reps Manl resist: combined finger flexion 2-5 3 x 15 Manl resist: combined finger flexion 2-5: 2  x15      Hook fist resisted 2nd-5th flexions: 2 x 15 Manl resist pron/sup R forearm 2 x 15 Manl resist pron/sup R forearm 2 x 15      Green Power web - hook fists 3 x 15, 2nd-5th finger adductions x 20 Manl resist R wrist flex, R wrist ext: 2 x 10 each Ball squeezing: 2 x 10      Manl resist: wrist extn, flexion, pronation, supination x 15 , radial dev. X 10 . R thumb to index putty pinches (brown putty) x 20                                                       Proprioceptive Activities:                                Manual Therapy: 8 min 15 miin 20 min      Joint mobs: R 2nd and 3rd PIP /DIP distraction, PIP palmar glides, intercarpal glides - all gr. 3-4 Joint mobs: Wrist distraction, palmar glide and intercarpal palmar glides - gr. 3-4, Distraction and palmar glides at thumb mcp, ip, digits 2-3 MCP,PIP, DIP's all gr. 3-4 Joint mobs:  Wrist distraction, palmar glide and intercarpal palmar glides - gr. 3-4,Distraction and palmar glides at 2-3 MCP,PIP, DIP's all gr. 3-4        Cross fiber massage at dorsal/palmar 2nd/3rd digit flexor/extensor tendon pathways over each phalanx                     Therapeutic Activities:                                  HEP: Exercises  Exercises  Seated Wrist Extension PROM - 2 x daily - 7 x weekly - 5 reps - 15 second hold  Seated Wrist Prayer Stretch - 2 x daily - 7 x weekly - 5 sets - 15 second hold  Seated Wrist Flexion with Overpressure - 2 x daily - 7 x weekly - 10 reps - 15 second hold  Seated Finger Composite Flexion Stretch - 2 x daily - 7 x weekly - 3 reps - 2 minuessccond hold  Wrist Pronation Stretch - 2 x daily - 7 x weekly - 5 sets - 15 second hold  Seated Wrist Supination Stretch - 2 x daily - 7 x weekly - 5 sets - 15 second hold    Added 12/10/2021: Emphasize 5 to 10 min holds on steady finger flexion stretch (MCP/IP to palm). OK to work on thumb-finger pinch using clothespin squeeze. 1/5/22: hand compression garments issued (incrediwear), wear as much as tolerated, including at night. [employer] Portal  Treatment/Session Summary:    · Response to Treatment: 4th /5th finger RODRIGEZ flexion lagging today compared to last few visits. 2nd digit RODRIGEZ improveed after cross fibermassage ofver tendon pathways. · Communication/Consultation:  None today  · Equipment provided today:  None today   · Recommendations/Intent for next treatment session: Emphasis on a/p/rrom to regain flexor tendon/finger flexion RODRIGEZ for normal /grasp, pronation/supination. Manual therapy for joint and tendon mobility.      Total Treatment Billable Duration:  47 min  PT Patient Time In/Time Out  Time In: 1100  Time Out: [address], PT    Future Appointments   Date Time Provider Kamille Reis   1/14/2022 11:00 AM Daria Weaver PT [facility]   1/17/2022 11:00 AM Daria Weaver PT [facility]   1/19/2022 11:00 AM HARLEY CristinaLESLI [facility]   1/21/2022 11:00 AM Daria Weaver PT [facility]   1/24/2022 11:00 AM Hiram Barragan, PT [facility] 1/26/2022 11:00 AM Torito Harrison, PT [facility]   1/28/2022 11:00 AM Torito Harrison, PT [facility]   1/31/2022 11:00 AM Rema Miller, PT [facility]

## 2022-01-14 ENCOUNTER — HOSPITAL ENCOUNTER (OUTPATIENT)
Dept: PHYSICAL THERAPY | Age: 82
Discharge: HOME | End: 2022-01-14
Payer: COMMERCIAL

## 2022-01-14 PROCEDURE — 97110 THERAPEUTIC EXERCISES: CPT

## 2022-01-14 PROCEDURE — 97140 MANUAL THERAPY 1/> REGIONS: CPT

## 2022-01-14 NOTE — PROGRESS NOTES
Danis Black  : 1940  Primary: [employer]*  Secondary:  Kiko Skaggs @ [address]  [address] [place] [address].  [phone]   [phone]      OUTPATIENT PHYSICAL THERAPY:  Progress Report /Daily Treatment Note  2022   ICD-10: Treatment Diagnosis: Pain in right wrist (M25.531), Stiffness of right wrist, not elsewhere classified (M25.631), Pain in left wrist (M25.532), Stiffness of left wrist, not elsewhere classified (M25.632), Stiffness of right hand, not elsewhere classified (M25.641) and Stiffness of left hand, not elsewhere classified (M25.642)  MEDICAL/REFERRING DIAGNOSIS:  Status post bilateral distal radius fractures   TREATMENT PLAN:  Effective Dates: 2022TO 3/2//2022 (60 days). Frequency/Duration: 2 times a week for 60 Days  GOALS: (Goals have been discussed and agreed upon with patient.)  Short-Term Functional Goals: Time Frame: 4 weeks:   1. Independent performance of home exercise program (MET)  2. Reduce R hand edema to normal levels to allow normal finger to palm fist motions (Progressing)  3. Increase R wrist/hand ROM's to 75% or better of normal  In all planes of motion (Progressing)  Discharge Goals: Time Frame: 12 weeks  1. Pt to demonstrate at least 35#   Strength L/R for normal weight carrying (progressing)  2. Pt to demonstrate L/R wrist/ hand ROM's 85% or better of normal all planes (progressing)  3. Improve Quick-DASH scores to 15/55 or better to reflect return to normal ADL function (progressing)  Rehabilitation Potential For Stated Goals: Good  _______________________________________________________________________  Pre-treatment Symptoms/Complaints: Thumb pain persists at thenar eminence, feels muscular.  Pt notes that index-to-thumb   Pain: Initial: 5/10 Post Session:  No increase/10   Medications Last Reviewed:  2022  Updated Objective Findings:    Observation/Orthostatic Postural Assessment:    Pt is wearing bilateral wrist splints. Palpation:    Diffuse right digital swelling, mild dorsal R hand swelling (12/29/2021)  ROM:    Left : Pt demonstrates ability to perform full left hook, open and full fists. AROM's : wrist flexion = 40 deg, extension = 45 deg, pronation/supination 75% of full AROM, Ulnar deviation = 18 deg, radial deviation = 20 deg. Right: Passive Mauricio Small digit ROM's: 2: passive RODRIGEZ now 75%. (12/22/2021), MCP flexion ~ 75 deg all. , Thumb IP = full extension, 50% flexion, MCP = full extension, 50% flexion. CMC: 25% flexion. CMC radial abduction 40 deg. Wrist flexion P/AROM = 55 deg (1/5/2022), extension A/PROM = 40 deg/48 deg  Deg (1/5/22), Ulnar deviation = 15 deg PROM, radial deviation = to 15 deg. R forearm active/passive. pronation/supination  = ~ 60 deg to 75 deg (12/29/2021). 1/10/22: [id number] palmar crease measures: R hand: index: 4.6cm, middle: 5.8 cm,  Strength:   Left:  digits all 4-/5, Wrist flex/ext all 34/5  Right: digits all 3-/5, wrist flex/ext 4-/5   TREATMENT:   THERAPEUTIC ACTIVITY: ( see below for minutes): Therapeutic activities per grid below to improve mobility, strength, balance and coordination. Required minimal visual, verbal, manual and tactile cues to improve independence and safety with daily activities . THERAPEUTIC EXERCISE: (see below for minutes):  Exercises per grid below to improve mobility, strength, balance and coordination. Required minimal verbal and manual cues to promote proper body alignment, promote proper body posture and promote proper body mechanics. Progressed resistance, range, repetitions and complexity of movement as indicated. MANUAL THERAPY: (see below for minutes): Joint mobilization and Soft tissue mobilization was utilized and necessary because of the patient's restricted joint motion, painful spasm, loss of articular motion and restricted motion of soft tissue.    MODALITIES: (see below for minutes):      to decrease pain    SELF CARE: (see below for minutes): Procedure(s) (per grid) utilized to improve and/or restore self-care/home management as related to dressing, bathing and grooming. Required minimal verbal cueing to facilitate activities of daily living skills and compensatory activities. Date: 1/7/22 (visit 21) 1/10/22 (visit 22) 1/12/22 (visit 23) 1/14/22 (visit 24)    Modalities:                                Therapeutic Exercise: 36 min 30 min 27 min 34 min     R 2-3-4 -5 and thumb: IP and MCP joint manual end range stretching flexion - individual joint and RODRIGEZ: 20 min R thumb IP/MCP and digits 2-3 MCP/PIP/DIP end range flexion stretching: 15 min R thumb IP/MCP and digits 2-3-4-5 MCP/PIP/DIP end range flexion stretching: 15 min R 2-5 digits Ip and MCP individual and combined stretches,including stretching over rubber dowel     Light manl resist thumb to 5th opposition x 15 reps Manl resist: combined finger flexion 2-5 3 x 15 Manl resist: combined finger flexion 2-5: 2  x15 Thumb cmc stretch: abduction, opposition     Hook fist resisted 2nd-5th flexions: 2 x 15 Manl resist pron/sup R forearm 2 x 15 Manl resist pron/sup R forearm 2 x 15 Gripping into rubber dowel: 2 x 20     Green Power web - hook fists 3 x 15, 2nd-5th finger adductions x 20 Manl resist R wrist flex, R wrist ext: 2 x 10 each Ball squeezing: 2 x 10 Wrist curls: 2 x 20 w/ robber dowel     Manl resist: wrist extn, flexion, pronation, supination x 15 , radial dev. X 10 . Reverse wrist curls 2 x 20 w/ rubber dowel     R thumb to index putty pinches (brown putty) x 20   Putty pinches: thumb to index and thumb to index/middle: 30x total        Palmar digit rolling over dowel : 2 min                                            Proprioceptive Activities:                                Manual Therapy: 8 min 15 miin 20 min 10 min     Joint mobs: R 2nd and 3rd PIP /DIP distraction, PIP palmar glides, intercarpal glides - all gr.  3-4 Joint mobs: Wrist distraction, palmar glide and intercarpal palmar glides - gr. 3-4, Distraction and palmar glides at thumb mcp, ip, digits 2-3 MCP,PIP, DIP's all gr. 3-4 Joint mobs: Wrist distraction, palmar glide and intercarpal palmar glides - gr. 3-4,Distraction and palmar glides at 2-3 MCP,PIP, DIP's all gr. 3-4 Joint mobs: distraction at IP's 2-5 and thumb, carpal palmar glides gr. 3.        Cross fiber massage at dorsal/palmar 2nd/3rd digit flexor/extensor tendon pathways over each phalanx                     Therapeutic Activities:                                  HEP: Exercises  Exercises  Seated Wrist Extension PROM - 2 x daily - 7 x weekly - 5 reps - 15 second hold  Seated Wrist Prayer Stretch - 2 x daily - 7 x weekly - 5 sets - 15 second hold  Seated Wrist Flexion with Overpressure - 2 x daily - 7 x weekly - 10 reps - 15 second hold  Seated Finger Composite Flexion Stretch - 2 x daily - 7 x weekly - 3 reps - 2 minuessccond hold  Wrist Pronation Stretch - 2 x daily - 7 x weekly - 5 sets - 15 second hold  Seated Wrist Supination Stretch - 2 x daily - 7 x weekly - 5 sets - 15 second hold    Added 12/10/2021: Emphasize 5 to 10 min holds on steady finger flexion stretch (MCP/IP to palm). OK to work on thumb-finger pinch using clothespin squeeze. 1/5/22: hand compression garments issued ([employer]), wear as much as tolerated, including at night. [employer] [facility]  Treatment/Session Summary:    · Response to Treatment: Overall progress towards meeting all goals remains slow but consistent. Pt's  not yet strong/mobile enough to allow  /grasp of steering wheel for driving or using pen to write. · Communication/Consultation:  None today  · Equipment provided today:  None today   · Recommendations/Intent for next treatment session: Emphasis on a/p/rrom to regain flexor tendon/finger flexion RODRIGEZ for normal /grasp, pronation/supination. Manual therapy for joint and tendon mobility.      Total Treatment Billable Duration:  44 minutes  PT Patient Time In/Time Out  Time In: 1100  Time Out: 250 [facility], PT    Future Appointments   Date Time Provider Kamille Reis   1/17/2022 11:00 AM Regina Burrell, PT [facility]   1/19/2022 11:00 AM [facility]   1/21/2022 11:00 AM [facility]   1/24/2022 11:00 AM [facility]   1/26/2022 11:00 AM [facility]   1/28/2022 11:00 AM [facility], [facility]   1/31/2022 11:00 AM Iqra Stokes, [facility]

## 2022-01-17 ENCOUNTER — HOSPITAL ENCOUNTER (OUTPATIENT)
Dept: PHYSICAL THERAPY | Age: 82
Discharge: HOME | End: 2022-01-17
Payer: COMMERCIAL

## 2022-01-18 NOTE — PROGRESS NOTES
Scheduled appointment cancelled due to inclement weather and Clinic closing.  POC will resume 1/19/2022    LESLI Duke, PT, DPT, OCS, MTC

## 2022-01-19 ENCOUNTER — HOSPITAL ENCOUNTER (OUTPATIENT)
Dept: PHYSICAL THERAPY | Age: 82
Discharge: HOME | End: 2022-01-19
Payer: COMMERCIAL

## 2022-01-19 PROCEDURE — 97110 THERAPEUTIC EXERCISES: CPT

## 2022-01-19 PROCEDURE — 97140 MANUAL THERAPY 1/> REGIONS: CPT

## 2022-01-19 NOTE — PROGRESS NOTES
Ekta Black  : 1940  Primary: [facility] hermila [place]*  Secondary:  [address] [facility] @ [address], [address] [place] [address]  [phone]   [phone]      OUTPATIENT PHYSICAL THERAPY:  Daily Treatment Note  2022   ICD-10: Treatment Diagnosis: Pain in right wrist (M25.531), Stiffness of right wrist, not elsewhere classified (M25.631), Pain in left wrist (M25.532), Stiffness of left wrist, not elsewhere classified (M25.632), Stiffness of right hand, not elsewhere classified (M25.641) and Stiffness of left hand, not elsewhere classified (M25.642)  MEDICAL/REFERRING DIAGNOSIS:  Status post bilateral distal radius fractures   TREATMENT PLAN:  Effective Dates: 2022TO 3/2//2022 (60 days). Frequency/Duration: 2 times a week for 60 Days  GOALS: (Goals have been discussed and agreed upon with patient.)  Short-Term Functional Goals: Time Frame: 4 weeks:   1. Independent performance of home exercise program (MET)  2. Reduce R hand edema to normal levels to allow normal finger to palm fist motions (Progressing)  3. Increase R wrist/hand ROM's to 75% or better of normal  In all planes of motion (Progressing)  Discharge Goals: Time Frame: 12 weeks  1. Pt to demonstrate at least 35#   Strength L/R for normal weight carrying (progressing)  2. Pt to demonstrate L/R wrist/ hand ROM's 85% or better of normal all planes (progressing)  3. Improve Quick-DASH scores to 15/55 or better to reflect return to normal ADL function (progressing)  Rehabilitation Potential For Stated Goals: Good  _______________________________________________________________________  Pre-treatment Symptoms/Complaints: PT states that she was forced to use her hand more than usual during the snow emergency.  Her normal helper couldn't make it over to help her with cooking, so she had to use a can opener for the first time since injury - it was really difficult to manipulate it but she was able to do so. Pain: Initial: 5/10 Post Session:  No increase/10   Medications Last Reviewed:  1/19/2022  Updated Objective Findings:    Observation/Orthostatic Postural Assessment:    Pt is wearing bilateral wrist splints. Palpation:    Diffuse right digital swelling, mild dorsal R hand swelling (12/29/2021)  ROM:    Left : Pt demonstrates ability to perform full left hook, open and full fists. AROM's : wrist flexion = 40 deg, extension = 45 deg, pronation/supination 75% of full AROM, Ulnar deviation = 18 deg, radial deviation = 20 deg. Right: Passive Yesenia Clas digit ROM's: 2: passive RODRIGEZ now 75%. (12/22/2021), MCP flexion ~ 75 deg all. , Thumb IP = full extension, 50% flexion, MCP = full extension, 50% flexion. CMC: 25% flexion. CMC radial abduction 40 deg. Wrist flexion P/AROM = 55 deg (1/5/2022), extension A/PROM = 40 deg/48 deg  Deg (1/5/22), Ulnar deviation = 15 deg PROM, radial deviation = to 15 deg. R forearm active/passive. pronation/supination  = ~ 60 deg to 75 deg (12/29/2021). [id number] palmar crease measures: R hand: index: 4.5 cm (1/19/22) middle: 5.8 cm (1/10/22)  Strength:   Left:  digits all 4-/5, Wrist flex/ext all 4/5  Right: digits all 3-/5, wrist flex/ext 4-/5   (1/19/22): 3rd run dynamometer: left = 32 #, right = 15#. TREATMENT:   THERAPEUTIC ACTIVITY: ( see below for minutes): Therapeutic activities per grid below to improve mobility, strength, balance and coordination. Required minimal visual, verbal, manual and tactile cues to improve independence and safety with daily activities . THERAPEUTIC EXERCISE: (see below for minutes):  Exercises per grid below to improve mobility, strength, balance and coordination. Required minimal verbal and manual cues to promote proper body alignment, promote proper body posture and promote proper body mechanics. Progressed resistance, range, repetitions and complexity of movement as indicated.   MANUAL THERAPY: (see below for minutes): Joint mobilization and Soft tissue mobilization was utilized and necessary because of the patient's restricted joint motion, painful spasm, loss of articular motion and restricted motion of soft tissue. MODALITIES: (see below for minutes):      to decrease pain    SELF CARE: (see below for minutes): Procedure(s) (per grid) utilized to improve and/or restore self-care/home management as related to dressing, bathing and grooming. Required minimal verbal cueing to facilitate activities of daily living skills and compensatory activities. Date: 1/7/22 (visit 21) 1/10/22 (visit 22) 1/12/22 (visit 23) 1/14/22 (visit 24) 1/19/22 (vsiit 25)   Modalities:                                Therapeutic Exercise: 36 min 30 min 27 min 34 min 34 min    R 2-3-4 -5 and thumb: IP and MCP joint manual end range stretching flexion - individual joint and RODRIGEZ: 20 min R thumb IP/MCP and digits 2-3 MCP/PIP/DIP end range flexion stretching: 15 min R thumb IP/MCP and digits 2-3-4-5 MCP/PIP/DIP end range flexion stretching: 15 min R 2-5 digits Ip and MCP individual and combined stretches,including stretching over rubber dowel R 2-5 MCP and ip joint, thumb IP and mcp : flexion end range stretching, gentle extension stretching - 20 min    Light manl resist thumb to 5th opposition x 15 reps Manl resist: combined finger flexion 2-5 3 x 15 Manl resist: combined finger flexion 2-5: 2  x15 Thumb cmc stretch: abduction, opposition Thumb cmc abduction, opposition stretching x 3 min    Hook fist resisted 2nd-5th flexions: 2 x 15 Manl resist pron/sup R forearm 2 x 15 Manl resist pron/sup R forearm 2 x 15 Gripping into rubber dowel: 2 x 20 Manl.  Resist ip flexions (hook fist) x 2 x 15    Green Power web - hook fists 3 x 15, 2nd-5th finger adductions x 20 Manl resist R wrist flex, R wrist ext: 2 x 10 each Ball squeezing: 2 x 10 Wrist curls: 2 x 20 w/ robber dowel End range stetching: wrist flexion, extension, end range supination, elbow flexion: 10 min    Manl resist: wrist extn, flexion, pronation, supination x 15 , radial dev. X 10 . Reverse wrist curls 2 x 20 w/ rubber dowel     R thumb to index putty pinches (brown putty) x 20   Putty pinches: thumb to index and thumb to index/middle: 30x total        Palmar digit rolling over dowel : 2 min                                            Proprioceptive Activities:                                Manual Therapy: 8 min 15 miin 20 min 10 min 10 min    Joint mobs: R 2nd and 3rd PIP /DIP distraction, PIP palmar glides, intercarpal glides - all gr. 3-4 Joint mobs: Wrist distraction, palmar glide and intercarpal palmar glides - gr. 3-4, Distraction and palmar glides at thumb mcp, ip, digits 2-3 MCP,PIP, DIP's all gr. 3-4 Joint mobs: Wrist distraction, palmar glide and intercarpal palmar glides - gr. 3-4,Distraction and palmar glides at 2-3 MCP,PIP, DIP's all gr. 3-4 Joint mobs: distraction at IP's 2-5 and thumb, carpal palmar glides gr. 3.  Joint mobs: distraction at thumb cmc, mcp and ip, fingers 2-3 MCP and pip, wrist distraction gr. 3-4, palmar glides gr. 4.       Cross fiber massage at dorsal/palmar 2nd/3rd digit flexor/extensor tendon pathways over each phalanx                     Therapeutic Activities:                                  HEP: Exercises  Exercises  Seated Wrist Extension PROM - 2 x daily - 7 x weekly - 5 reps - 15 second hold  Seated Wrist Prayer Stretch - 2 x daily - 7 x weekly - 5 sets - 15 second hold  Seated Wrist Flexion with Overpressure - 2 x daily - 7 x weekly - 10 reps - 15 second hold  Seated Finger Composite Flexion Stretch - 2 x daily - 7 x weekly - 3 reps - 2 minuessccond hold  Wrist Pronation Stretch - 2 x daily - 7 x weekly - 5 sets - 15 second hold  Seated Wrist Supination Stretch - 2 x daily - 7 x weekly - 5 sets - 15 second hold    Added 12/10/2021: Emphasize 5 to 10 min holds on steady finger flexion stretch (MCP/IP to palm).  OK to work on thumb-finger pinch using clothespin squeeze. 1/5/22: hand compression garments issued ([employer]), wear as much as tolerated, including at night. [facility] Portal  Treatment/Session Summary:    · Response to Treatment: PT finally able to generate force with gripping. Index to thumb flexion increasing, pt able to curl index to lateral pinch to DIP now. · Communication/Consultation:  None today  · Equipment provided today:  None today   · Recommendations/Intent for next treatment session: Emphasis on a/p/rrom to regain flexor tendon/finger flexion RODRIGEZ for normal /grasp, pronation/supination. Manual therapy for joint and tendon mobility.      Total Treatment Billable Duration:  44 minutes  PT Patient Time In/Time Out  Time In: 1100  Time Out: 250 [facility], PT    Future Appointments   Date Time Provider Kamille Reis   1/21/2022 11:00 AM Othelia Pod, PT [facility]   1/24/2022 11:00 AM Charma Fruits, Too Lot, PT [facility]   1/26/2022 11:00 AM Othelia Pod, PT [facility]   1/28/2022 11:00 AM Othelia Pod, PT [facility]   1/31/2022 11:00 AM Othelia Pod, PT [facility]   2/2/2022 11:00 AM Othelia Pod, PT [facility]   2/7/2022 11:00 AM Othelia Pod, PT [facility]   2/9/2022 11:00 AM Othelia Pod, PT [facility]   2/14/2022 11:00 AM Othelia Pod, PT [facility]   2/16/2022 11:00 AM Othelia Pod, PT [facility]   2/21/2022 11:00 AM Othelia Pod, PT [facility]   2/23/2022 11:00 AM [facility]   2/28/2022 11:00 AM Charma Fruits, Too Lot, PT [facility]

## 2022-01-21 ENCOUNTER — HOSPITAL ENCOUNTER (OUTPATIENT)
Dept: PHYSICAL THERAPY | Age: 82
Discharge: HOME | End: 2022-01-21
Payer: COMMERCIAL

## 2022-01-21 PROCEDURE — 97140 MANUAL THERAPY 1/> REGIONS: CPT

## 2022-01-21 PROCEDURE — 97110 THERAPEUTIC EXERCISES: CPT

## 2022-01-21 NOTE — PROGRESS NOTES
Fredi Black  : 1940  Primary: [facility] [place]*  Secondary:  [address] @ [place] [address].  [phone]   [phone]      OUTPATIENT PHYSICAL THERAPY:  Daily Treatment Note  2022   ICD-10: Treatment Diagnosis: Pain in right wrist (M25.531), Stiffness of right wrist, not elsewhere classified (M25.631), Pain in left wrist (M25.532), Stiffness of left wrist, not elsewhere classified (M25.632), Stiffness of right hand, not elsewhere classified (M25.641) and Stiffness of left hand, not elsewhere classified (M25.642)  MEDICAL/REFERRING DIAGNOSIS:  Status post bilateral distal radius fractures   TREATMENT PLAN:  Effective Dates: 2022TO 3/2//2022 (60 days). Frequency/Duration: 2 times a week for 60 Days  GOALS: (Goals have been discussed and agreed upon with patient.)  Short-Term Functional Goals: Time Frame: 4 weeks:   1. Independent performance of home exercise program (MET)  2. Reduce R hand edema to normal levels to allow normal finger to palm fist motions (Progressing)  3. Increase R wrist/hand ROM's to 75% or better of normal  In all planes of motion (Progressing)  Discharge Goals: Time Frame: 12 weeks  1. Pt to demonstrate at least 35#   Strength L/R for normal weight carrying (progressing)  2. Pt to demonstrate L/R wrist/ hand ROM's 85% or better of normal all planes (progressing)  3. Improve Quick-DASH scores to 15/55 or better to reflect return to normal ADL function (progressing)  Rehabilitation Potential For Stated Goals: Good  _______________________________________________________________________  Pre-treatment Symptoms/Complaints: Thumb pain and limited mobility is hindering ability to  Reji Hecole.    Pain: Initial: 5/10 Post Session:  No increase/10   Medications Last Reviewed:  2022  Updated Objective Findings:    Observation/Orthostatic Postural Assessment:    Pt is wearing right hand compressive glove. Palpation:    Diffuse right digital swelling, mild dorsal R hand swelling (12/29/2021)  ROM:    Left : Pt demonstrates ability to perform full left hook, open and full fists. AROM's : wrist flexion = 40 deg, extension = 45 deg, pronation/supination 75% of full AROM, Ulnar deviation = 18 deg, radial deviation = 20 deg. Right: Passive Oley Naegeli digit ROM's: 2: passive RODRIGEZ now 75%. (12/22/2021), MCP flexion ~ 75 deg all. , Thumb IP = full extension, 50% flexion, MCP = full extension, 50% flexion. CMC: 25% flexion. CMC radial abduction 40 deg. Wrist flexion P/AROM = 55 deg (1/5/2022), extension A/PROM = 40 deg/48 deg  Deg (1/5/22), Ulnar deviation = 15 deg PROM, radial deviation = to 15 deg. R forearm active/passive. pronation/supination  = ~ 60 deg to 75 deg (12/29/2021). Rwmfekklid-lp-jqdxhttp palmar crease measures: R hand: index: 4.5 cm (1/19/22) middle: 5.8 cm (1/10/22)  Strength:   Left:  digits all 4-/5, Wrist flex/ext all 4/5  Right: digits all 3-/5, wrist flex/ext 4-/5   (1/19/22): 3rd run dynamometer: left = 32 #, right = 15#. TREATMENT:   THERAPEUTIC ACTIVITY: ( see below for minutes): Therapeutic activities per grid below to improve mobility, strength, balance and coordination. Required minimal visual, verbal, manual and tactile cues to improve independence and safety with daily activities . THERAPEUTIC EXERCISE: (see below for minutes):  Exercises per grid below to improve mobility, strength, balance and coordination. Required minimal verbal and manual cues to promote proper body alignment, promote proper body posture and promote proper body mechanics. Progressed resistance, range, repetitions and complexity of movement as indicated.   MANUAL THERAPY: (see below for minutes): Joint mobilization and Soft tissue mobilization was utilized and necessary because of the patient's restricted joint motion, painful spasm, loss of articular motion and restricted motion of soft tissue. MODALITIES: (see below for minutes):      to decrease pain    SELF CARE: (see below for minutes): Procedure(s) (per grid) utilized to improve and/or restore self-care/home management as related to dressing, bathing and grooming. Required minimal verbal cueing to facilitate activities of daily living skills and compensatory activities. Date: 1/21/22 (visit 26)       Modalities:                                Therapeutic Exercise: 36 min        R MCP/PIP/DIP flexion/extension, thumb IP/MCP and cmc flexion, abduction and opposition (combined ROM stretches) x 20 min        R slow rep digits 2-3 flexions 2 x 10 rep        R thumb full range oppositions x 10 x 5\"        R thumb circumductions  X 4 x 10         Therabar red: 2 x 15 pronation bilat        Therabar red 2 x 15 supinations bilat        Therabar red alternating wrist flexion/extension 2 x 10 pronated, 1 x 10 supinated        Therabar red /pulls x 5 x 5\"        Passive wrist extn stretch x 2 min                               Proprioceptive Activities:                                Manual Therapy: 8 min        R MCP/PIP/DIP distractions digits 2-3-4-5, gr. 3, wrist palmar glides gr. 3                                Therapeutic Activities:                                  HEP: Exercises  Exercises  Seated Wrist Extension PROM - 2 x daily - 7 x weekly - 5 reps - 15 second hold  Seated Wrist Prayer Stretch - 2 x daily - 7 x weekly - 5 sets - 15 second hold  Seated Wrist Flexion with Overpressure - 2 x daily - 7 x weekly - 10 reps - 15 second hold  Seated Finger Composite Flexion Stretch - 2 x daily - 7 x weekly - 3 reps - 2 minuessccond hold  Wrist Pronation Stretch - 2 x daily - 7 x weekly - 5 sets - 15 second hold  Seated Wrist Supination Stretch - 2 x daily - 7 x weekly - 5 sets - 15 second hold    Added 12/10/2021: Emphasize 5 to 10 min holds on steady finger flexion stretch (MCP/IP to palm).  OK to work on thumb-finger pinch using clothespin squeeze. 1/5/22: hand compression garments issued (incrediwear), wear as much as tolerated, including at night. [employer] Portal  Treatment/Session Summary:    · Response to Treatment: Able to finally passively oppose to ring MCP  · Communication/Consultation:  None today  · Equipment provided today:  None today   · Recommendations/Intent for next treatment session: Emphasis on a/p/rrom to regain flexor tendon/finger flexion RODRIGEZ for normal /grasp, pronation/supination. Manual therapy for joint and tendon mobility.      Total Treatment Billable Duration:  44 minutes  PT Patient Time In/Time Out  Time In: 1100  Time Out: 250 [facility], PT    Future Appointments   Date Time Provider Kamille Reis   1/24/2022 11:00 AM Iftikhar Urvashi, PT [facility]   1/26/2022 11:00 AM Iftikhar Urvashi, PT [facility]   1/28/2022 11:00 AM Iftikhar Urvashi, PT [facility]   1/31/2022 11:00 AM Iftikhar Urvashi, PT [facility]   2/2/2022 11:00 AM Iftikhar Urvashi, PT [facility]   2/7/2022 11:00 AM Iftikhar Urvashi, PT [facility]   2/9/2022 11:00 AM Iftikhar Urvashi, PT [facility]   2/14/2022 11:00 AM Iftikhar Urvashi, PT [facility]   2/16/2022 11:00 AM Iftikhar Urvashi, PT [facility]   2/21/2022 11:00 [facility]   2/23/2022 11:00 AM [facility]   2/28/2022 11:00 AM Talita Monaco, PT [facility]

## 2022-01-24 ENCOUNTER — HOSPITAL ENCOUNTER (OUTPATIENT)
Dept: PHYSICAL THERAPY | Age: 82
Discharge: HOME | End: 2022-01-24
Payer: COMMERCIAL

## 2022-01-24 PROCEDURE — 97140 MANUAL THERAPY 1/> REGIONS: CPT

## 2022-01-24 PROCEDURE — 97110 THERAPEUTIC EXERCISES: CPT

## 2022-01-24 NOTE — PROGRESS NOTES
Maddie Black  : 1940  Primary: [facility] [place]*  Secondary:  [address] @ [place] [address].  [phone]   [phone]      OUTPATIENT PHYSICAL THERAPY:  Daily Treatment Note  2022   ICD-10: Treatment Diagnosis: Pain in right wrist (M25.531), Stiffness of right wrist, not elsewhere classified (M25.631), Pain in left wrist (M25.532), Stiffness of left wrist, not elsewhere classified (M25.632), Stiffness of right hand, not elsewhere classified (M25.641) and Stiffness of left hand, not elsewhere classified (M25.642)  MEDICAL/REFERRING DIAGNOSIS:  Status post bilateral distal radius fractures   TREATMENT PLAN:  Effective Dates: 2022TO 3/2//2022 (60 days). Frequency/Duration: 2 times a week for 60 Days  GOALS: (Goals have been discussed and agreed upon with patient.)  Short-Term Functional Goals: Time Frame: 4 weeks:   1. Independent performance of home exercise program (MET)  2. Reduce R hand edema to normal levels to allow normal finger to palm fist motions (Progressing)  3. Increase R wrist/hand ROM's to 75% or better of normal  In all planes of motion (Progressing)  Discharge Goals: Time Frame: 12 weeks  1. Pt to demonstrate at least 35#   Strength L/R for normal weight carrying (progressing)  2. Pt to demonstrate L/R wrist/ hand ROM's 85% or better of normal all planes (progressing)  3. Improve Quick-DASH scores to 15/55 or better to reflect return to normal ADL function (progressing)  Rehabilitation Potential For Stated Goals: Good  _______________________________________________________________________  Pre-treatment Symptoms/Complaints: About the same since last visit. Hopes to get enough hand mobility and strength to resume driving soon.    Pain: Initial: 5/10 Post Session:  No increase/10   Medications Last Reviewed:  2022  Updated Objective Findings:    Observation/Orthostatic Postural Assessment:    Pt is wearing right hand compressive glove. Palpation:    Diffuse right digital swelling, mild dorsal R hand swelling (12/29/2021)  ROM:    Left : Pt demonstrates ability to perform full left hook, open and full fists. AROM's : wrist flexion = 40 deg, extension = 45 deg, pronation/supination 75% of full AROM, Ulnar deviation = 18 deg, radial deviation = 20 deg. Right: Passive Mauricio Small digit ROM's: 2: passive RODRIGEZ now 75%. (12/22/2021), MCP flexion at index = 90 deg (1/22/22) , Thumb IP = full extension, 50% flexion, MCP = full extension, 50% flexion. CMC: 25% flexion. CMC radial abduction 40 deg. Wrist flexion P/AROM = 55 deg (1/5/2022), extension A/PROM = 40 deg/48 deg  Deg (1/5/22), Ulnar deviation = 15 deg PROM, radial deviation = to 15 deg. R forearm active/passive. pronation/supination  = ~ 60 deg to 75 deg (12/29/2021). Snwoslexzc-fg-yilyrecn palmar crease measures: R hand: index: 4.5 cm (1/19/22) middle: 5.8 cm (1/10/22)  Strength:   Left:  digits all 4-/5, Wrist flex/ext all 4/5  Right: digits all 3-/5, wrist flex/ext 4-/5   (1/19/22): 3rd run dynamometer: left = 32 #, right = 15#. TREATMENT:   THERAPEUTIC ACTIVITY: ( see below for minutes): Therapeutic activities per grid below to improve mobility, strength, balance and coordination. Required minimal visual, verbal, manual and tactile cues to improve independence and safety with daily activities . THERAPEUTIC EXERCISE: (see below for minutes):  Exercises per grid below to improve mobility, strength, balance and coordination. Required minimal verbal and manual cues to promote proper body alignment, promote proper body posture and promote proper body mechanics. Progressed resistance, range, repetitions and complexity of movement as indicated.   MANUAL THERAPY: (see below for minutes): Joint mobilization and Soft tissue mobilization was utilized and necessary because of the patient's restricted joint motion, painful spasm, loss of articular motion and restricted motion of soft tissue. MODALITIES: (see below for minutes):      to decrease pain    SELF CARE: (see below for minutes): Procedure(s) (per grid) utilized to improve and/or restore self-care/home management as related to dressing, bathing and grooming. Required minimal verbal cueing to facilitate activities of daily living skills and compensatory activities. Date: 1/21/22 (visit 26) 1/24/22 (visit 27)      Modalities:                                Therapeutic Exercise: 36 min 36 min       R MCP/PIP/DIP flexion/extension, thumb IP/MCP and cmc flexion, abduction and opposition (combined ROM stretches) x 20 min R MCP/PIP/DIP flexion/extension, thumb IP/MCP and cmc flexion, abduction and opposition (combined ROM stretches) x 20 min       R slow rep digits 2-3 flexions 2 x 10 rep Ball squeezes w/ IP emphasis x 30       R thumb full range oppositions x 10 x 5\"        R thumb circumductions  X 4 x 10  R thumb circumductions  X 4 x 15       Therabar red: 2 x 15 pronation bilat Therabar red: 30x pronation bilat       Therabar red 2 x 15 supinations bilat Therabar red 30x supinations bilat       Therabar red alternating wrist flexion/extension 2 x 10 pronated, 1 x 10 supinated Therabar red alternating wrist flexion/extension 20 pronated,        Therabar red /pulls x 5 x 5\" Therabar red /pulls x 10 x 5\"       Passive wrist extn stretch x 2 min Paper pinch index/thumb pulls 10 x 5\"        Passive wrist extn stretch x 2 min        manl resist thumb opposition x 10              Proprioceptive Activities:                                Manual Therapy: 8 min 8 min       R MCP/PIP/DIP distractions digits 2-3-4-5, gr. 3, wrist palmar glides gr. 3  R MCP/PIP/DIP distractions digits 2-3-4-5, gr. 3, r THUMB ip/mcp/cmc distraction gr. 3, wrist distraction gr.  3.                               Therapeutic Activities:                                  HEP: Exercises  Exercises  Seated Wrist Extension PROM - 2 x daily - 7 x weekly - 5 reps - 15 second hold  Seated Wrist Prayer Stretch - 2 x daily - 7 x weekly - 5 sets - 15 second hold  Seated Wrist Flexion with Overpressure - 2 x daily - 7 x weekly - 10 reps - 15 second hold  Seated Finger Composite Flexion Stretch - 2 x daily - 7 x weekly - 3 reps - 2 minuessccond hold  Wrist Pronation Stretch - 2 x daily - 7 x weekly - 5 sets - 15 second hold  Seated Wrist Supination Stretch - 2 x daily - 7 x weekly - 5 sets - 15 second hold    Added 12/10/2021: Emphasize 5 to 10 min holds on steady finger flexion stretch (MCP/IP to palm). OK to work on thumb-finger pinch using clothespin squeeze. 1/5/22: hand compression garments issued ([employer]), wear as much as tolerated, including at night. [employer] Portal  Treatment/Session Summary:    · Response to Treatment: R index MCP passively flexed to 90 deg angle for first time today. Finger extensors weak, hindering hook fist posturing. · Communication/Consultation:  None today  · Equipment provided today:  None today   · Recommendations/Intent for next treatment session: Emphasis on a/p/rrom to regain flexor tendon/finger flexion RODRIGEZ for normal /grasp, pronation/supination. Manual therapy for joint and tendon mobility.      Total Treatment Billable Duration:  44 minutes  PT Patient Time In/Time Out  Time In: 1100  Time Out: 250 [facility], PT    Future Appointments   Date Time Provider Kamille Reis   1/26/2022 11:00 AM Jailyn Martínez PT [facility]   1/28/2022 11:00 AM Jailyn Martínez PT [facility]   1/31/2022 11:45 AM Jailyn Martínez PT [facility]   2/2/2022 11:00 AM HARLEY Garcia [facility]   2/7/2022 11:00 AM Jailyn Martínez PT [facility]   2/9/2022 11:00 AM HARLEY Garcia [facility]   2/14/2022 11:00 AM HARLEY Garcia [facility]   2/16/2022 11:00 AM Aleck Ouch, PT [facility]   2/21/2022 11:00 AM Ivette Rodríguez, PT [facility]   2/23/2022 11:00 AM Ivette Rodríguez PT [facility]   2/28/2022 11:00 AM Hennie Epley, Haskell Spaniel, PT [facility]

## 2022-01-26 ENCOUNTER — HOSPITAL ENCOUNTER (OUTPATIENT)
Dept: PHYSICAL THERAPY | Age: 82
Discharge: HOME | End: 2022-01-26
Payer: COMMERCIAL

## 2022-01-26 PROCEDURE — 97110 THERAPEUTIC EXERCISES: CPT

## 2022-01-26 NOTE — PROGRESS NOTES
Mikaela Black  : 1940  Primary: [facility] Caceres [facility] [place]*  Secondary:  [address] [facility] @ [address] [place] [address].  [phone]   [phone]      OUTPATIENT PHYSICAL THERAPY:  Daily Treatment Note  2022   ICD-10: Treatment Diagnosis: Pain in right wrist (M25.531), Stiffness of right wrist, not elsewhere classified (M25.631), Pain in left wrist (M25.532), Stiffness of left wrist, not elsewhere classified (M25.632), Stiffness of right hand, not elsewhere classified (M25.641) and Stiffness of left hand, not elsewhere classified (M25.642)  MEDICAL/REFERRING DIAGNOSIS:  Status post bilateral distal radius fractures   TREATMENT PLAN:  Effective Dates: 2022TO 3/2//2022 (60 days). Frequency/Duration: 2 times a week for 60 Days  GOALS: (Goals have been discussed and agreed upon with patient.)  Short-Term Functional Goals: Time Frame: 4 weeks:   1. Independent performance of home exercise program (MET)  2. Reduce R hand edema to normal levels to allow normal finger to palm fist motions (Progressing)  3. Increase R wrist/hand ROM's to 75% or better of normal  In all planes of motion (Progressing)  Discharge Goals: Time Frame: 12 weeks  1. Pt to demonstrate at least 35#   Strength L/R for normal weight carrying (progressing)  2. Pt to demonstrate L/R wrist/ hand ROM's 85% or better of normal all planes (progressing)  3. Improve Quick-DASH scores to 15/55 or better to reflect return to normal ADL function (progressing)  Rehabilitation Potential For Stated Goals: Good  _______________________________________________________________________  Pre-treatment Symptoms/Complaints: Notes that her index finger function has improved,middle finger and ability to oppose thumb to middle tip.   Pain: Initial: 5/10 Post Session:  No increase/10   Medications Last Reviewed:  2022  Updated Objective Findings:    Observation/Orthostatic Postural Assessment:    Pt is wearing right hand compressive glove. Palpation:    Diffuse right digital swelling, mild dorsal R hand swelling (12/29/2021)  ROM:    Left : Pt demonstrates ability to perform full left hook, open and full fists. AROM's : wrist flexion = 40 deg, extension = 45 deg, pronation/supination 75% of full AROM, Ulnar deviation = 18 deg, radial deviation = 20 deg. Right: Passive Darío Cornet digit ROM's: 2: passive RODRIGEZ now 75%. (12/22/2021), MCP flexion at index = 90 deg (1/22/22) , Thumb IP = full extension, 50% flexion, MCP = full extension, 50% flexion. CMC: 25% flexion. CMC radial abduction 40 deg. Wrist flexion P/AROM = 55 deg (1/5/2022), extension A/PROM = 40 deg/48 deg  Deg (1/5/22), Ulnar deviation = 15 deg PROM, radial deviation = to 15 deg. R forearm active/passive. pronation/supination  = ~ 60 deg to 75 deg (12/29/2021). Tlfaiuhcsc-dn-egeeaikt palmar crease measures: R hand: index: 4.5 cm (1/19/22) middle: 5.5 cm (1/26/22)  Strength:   Left:  digits all 4-/5, Wrist flex/ext all 4/5  Right: digits all 3-/5, wrist flex/ext 4-/5   (1/19/22): 3rd run dynamometer: left = 32 #, right = 15#. TREATMENT:   THERAPEUTIC ACTIVITY: ( see below for minutes): Therapeutic activities per grid below to improve mobility, strength, balance and coordination. Required minimal visual, verbal, manual and tactile cues to improve independence and safety with daily activities . THERAPEUTIC EXERCISE: (see below for minutes):  Exercises per grid below to improve mobility, strength, balance and coordination. Required minimal verbal and manual cues to promote proper body alignment, promote proper body posture and promote proper body mechanics. Progressed resistance, range, repetitions and complexity of movement as indicated.   MANUAL THERAPY: (see below for minutes): Joint mobilization and Soft tissue mobilization was utilized and necessary because of the patient's restricted joint motion, painful spasm, loss of articular motion and restricted motion of soft tissue. MODALITIES: (see below for minutes):      to decrease pain    SELF CARE: (see below for minutes): Procedure(s) (per grid) utilized to improve and/or restore self-care/home management as related to dressing, bathing and grooming. Required minimal verbal cueing to facilitate activities of daily living skills and compensatory activities.      Date: 1/21/22 (visit 26) 1/24/22 (visit 27) 1/26/22 (visit 28)     Modalities:                                Therapeutic Exercise: 36 min 36 min 40 min      R MCP/PIP/DIP flexion/extension, thumb IP/MCP and cmc flexion, abduction and opposition (combined ROM stretches) x 20 min R MCP/PIP/DIP flexion/extension, thumb IP/MCP and cmc flexion, abduction and opposition (combined ROM stretches) x 20 min End range flexion with extension stretch rest breaks: thumb IP/MCP/CMC, index and ring PIP/DIP/MCP flexions (individual and combined flexion): 15 min      R slow rep digits 2-3 flexions 2 x 10 rep Ball squeezes w/ IP emphasis x 30 Hand squeezes; 2 min AROM, 2 min into green ball      R thumb full range oppositions x 10 x 5\"  Manl resist index/ring composite flexion: 3 x 12, f b/ 2 x 10 \" holds in flexion,  thumb composite flexion x 12      R thumb circumductions  X 4 x 10  R thumb circumductions  X 4 x 15 THerabar: pronation w/ green bar x 10, red bar x 30      Therabar red: 2 x 15 pronation bilat Therabar red: 30x pronation bilat Therabar supination w/ green bar x 10, red bar x 30      Therabar red 2 x 15 supinations bilat Therabar red 30x supinations bilat Therabar red alternating wrist flexion/extension 20 pronated,       Therabar red alternating wrist flexion/extension 2 x 10 pronated, 1 x 10 supinated Therabar red alternating wrist flexion/extension 20 pronated,  End range wrist extension stretch x 2 min, supination stretch x 2 min      Therabar red /pulls x 5 x 5\" Therabar red /pulls x 10 x 5\" Thumb-index pinches w/ red clip x 30      Passive wrist extn stretch x 2 min Paper pinch index/thumb pulls 10 x 5\"        Passive wrist extn stretch x 2 min        manl resist thumb opposition x 10              Proprioceptive Activities:                                Manual Therapy: 8 min 8 min 5 min      R MCP/PIP/DIP distractions digits 2-3-4-5, gr. 3, wrist palmar glides gr. 3  R MCP/PIP/DIP distractions digits 2-3-4-5, gr. 3, r THUMB ip/mcp/cmc distraction gr. 3, wrist distraction gr. 3.  R index and ring finger long axis tractioning (MCP-IP's)                             Therapeutic Activities:                                  HEP: Exercises  Exercises  Seated Wrist Extension PROM - 2 x daily - 7 x weekly - 5 reps - 15 second hold  Seated Wrist Prayer Stretch - 2 x daily - 7 x weekly - 5 sets - 15 second hold  Seated Wrist Flexion with Overpressure - 2 x daily - 7 x weekly - 10 reps - 15 second hold  Seated Finger Composite Flexion Stretch - 2 x daily - 7 x weekly - 3 reps - 2 minuessccond hold  Wrist Pronation Stretch - 2 x daily - 7 x weekly - 5 sets - 15 second hold  Seated Wrist Supination Stretch - 2 x daily - 7 x weekly - 5 sets - 15 second hold    Added 12/10/2021: Emphasize 5 to 10 min holds on steady finger flexion stretch (MCP/IP to palm). OK to work on thumb-finger pinch using clothespin squeeze. 1/5/22: hand compression garments issued ([employer]), wear as much as tolerated, including at night. [employer] Portal  Treatment/Session Summary:    · Response to Treatment: Able today to flex Middle digit PIP to 90 deg. With stretching, able to creasete thumb to middle tip pinch ROM bu tAROM for this ~ 1cm short. · Communication/Consultation:  None today  · Equipment provided today:  None today   · Recommendations/Intent for next treatment session: Emphasis on a/p/rrom to regain flexor tendon/finger flexion RODRIGEZ for normal /grasp, pronation/supination. Manual therapy for joint and tendon mobility. Total Treatment Billable Duration:  45 minutes  PT Patient Time In/Time Out  Time In: 1100  Time Out: 250 [facility], PT    Future Appointments   Date Time Provider Kamille Reis   1/28/2022 11:00 AM Aguilar Sleet, PT [facility]   1/31/2022 11:45 AM Alma RosaEdwardo Hong, PT [facility]   2/2/2022 11:00 AM Aguilar Sleet, PT [facility]   2/7/2022 11:00 AM Aguilar Sleet, PT [facility]   2/9/2022 11:00 AM Aguilar Sleet, PT [facility]   2/14/2022 11:00 AM Aguilar Sleet, PT [facility]   2/16/2022 11:00 AM [facility]   2/21/2022 11:00 AM Aguilar Sleet, PT [facility]   2/23/2022 11:00 AM Aguilar Sleet, PT [facility]   2/28/2022 11:00 AM Edward[facility] Perry, [facility]

## 2022-01-28 ENCOUNTER — HOSPITAL ENCOUNTER (OUTPATIENT)
Dept: PHYSICAL THERAPY | Age: 82
Discharge: HOME | End: 2022-01-28
Payer: COMMERCIAL

## 2022-01-28 PROCEDURE — 97110 THERAPEUTIC EXERCISES: CPT

## 2022-01-28 NOTE — PROGRESS NOTES
Odilia Paul   (TOD:9/42/5320) [address] [facility] [address]  [address] [place] [address].  [phone]   [phone]           PHYSICIAN COMMUNICATION    REFERRING PHYSICIAN: Dave Pena MD  Return Physician Appointment: 1/31/2022    MEDICAL/REFERRING DIAGNOSIS:  · Fracture of forearm [S52.90XA]  ATTENDANCE: Odilia Paul has attended 27 out of 28 visits, with 1 cancellation (weather emergency) and 0 no shows. ASSESSMENT:  DATE: 1/28/2022    PROGRESS: Odilia Paul continues to make slow but steady gains in right wrist and hand ROM's and strength.  strengths are at 15# right, 31# left (3rd rung dynamometer). ROM's are improving slowly in regards to regaining hook, open and full fist ROM's to approximately 75% of full and in regaining thumb to palm opposition (limited to mid palm). She reports left thenar eminence pain during attempts to oppose thumb and perform thumb to index pinching. Wrist extension/flexion ROM\"s = 50%, pronation/supinations now 75%+    RECOMMENDATIONS: Continue PT 2x/ week x 8 weeks with goals to gain back > 75% ROM in all wrist/hand/finger planes to allow using a pen, gripping and grasping for driving and utensil use, return to functional ADL performance with right hand.      Thank you for this referral,  Joann Varghese, PT     Referring Physician Signature: Dave Pena MD          Date

## 2022-01-28 NOTE — PROGRESS NOTES
Brenda Black  : 1940  Primary: [employer]  Secondary:  [address] @ [place] [address].  [phone]   [phone]      OUTPATIENT PHYSICAL THERAPY:  Daily Treatment Note  2022   ICD-10: Treatment Diagnosis: Pain in right wrist (M25.531), Stiffness of right wrist, not elsewhere classified (M25.631), Pain in left wrist (M25.532), Stiffness of left wrist, not elsewhere classified (M25.632), Stiffness of right hand, not elsewhere classified (M25.641) and Stiffness of left hand, not elsewhere classified (M25.642)  MEDICAL/REFERRING DIAGNOSIS:  Status post bilateral distal radius fractures   TREATMENT PLAN:  Effective Dates: 2022TO 3/2//2022 (60 days). Frequency/Duration: 2 times a week for 60 Days  GOALS: (Goals have been discussed and agreed upon with patient.)  Short-Term Functional Goals: Time Frame: 4 weeks:   1. Independent performance of home exercise program (MET)  2. Reduce R hand edema to normal levels to allow normal finger to palm fist motions (Progressing)  3. Increase R wrist/hand ROM's to 75% or better of normal  In all planes of motion (Progressing)  Discharge Goals: Time Frame: 12 weeks  1. Pt to demonstrate at least 35#   Strength L/R for normal weight carrying (progressing)  2. Pt to demonstrate L/R wrist/ hand ROM's 85% or better of normal all planes (progressing)  3. Improve Quick-DASH scores to 15/55 or better to reflect return to normal ADL function (progressing)  Rehabilitation Potential For Stated Goals: Good  _______________________________________________________________________  Pre-treatment Symptoms/Complaints: Thumb continues to have limited mobility as does middle finger.    Pain: Initial: 5/10 Post Session:  No increase/10   Medications Last Reviewed:  2022  Updated Objective Findings:    Observation/Orthostatic Postural Assessment:    Pt is wearing right hand compressive glove. Palpation:    Diffuse right digital swelling, mild dorsal R hand swelling (12/29/2021)  ROM:    Left : Pt demonstrates ability to perform full left hook, open and full fists. AROM's : wrist flexion = 40 deg, extension = 45 deg, pronation/supination 75% of full AROM, Ulnar deviation = 18 deg, radial deviation = 20 deg. Right: Passive Dorena Arm digit ROM's: 2: passive RODRIGEZ now 75%. (12/22/2021), MCP flexion at index = 90 deg (1/22/22) , Thumb IP = full extension, 50% flexion, MCP = full extension, 50% flexion. CMC: 25% flexion. CMC radial abduction 40 deg. Wrist flexion P/AROM = 55 deg (1/5/2022), extension A/PROM = 40 deg/48 deg  Deg (1/5/22), Ulnar deviation = 15 deg PROM, radial deviation = to 15 deg. R forearm active/passive. pronation/supination  = ~ 60 deg to 75 deg (12/29/2021). Wtyktqvmgl-jl-zpmvyrsg palmar crease measures: R hand: index: 4.5 cm (1/19/22) middle: 5.5 cm (1/26/22)  Strength:   Left:  digits all 4-/5, Wrist flex/ext all 4/5  Right: digits all 3-/5, wrist flex/ext 4-/5   (1/19/22): 3rd run dynamometer: left = 32 #, right = 15#. 1/28/22: right = 15#   TREATMENT:   THERAPEUTIC ACTIVITY: ( see below for minutes): Therapeutic activities per grid below to improve mobility, strength, balance and coordination. Required minimal visual, verbal, manual and tactile cues to improve independence and safety with daily activities . THERAPEUTIC EXERCISE: (see below for minutes):  Exercises per grid below to improve mobility, strength, balance and coordination. Required minimal verbal and manual cues to promote proper body alignment, promote proper body posture and promote proper body mechanics. Progressed resistance, range, repetitions and complexity of movement as indicated.   MANUAL THERAPY: (see below for minutes): Joint mobilization and Soft tissue mobilization was utilized and necessary because of the patient's restricted joint motion, painful spasm, loss of articular motion and restricted motion of soft tissue. MODALITIES: (see below for minutes):      to decrease pain    SELF CARE: (see below for minutes): Procedure(s) (per grid) utilized to improve and/or restore self-care/home management as related to dressing, bathing and grooming. Required minimal verbal cueing to facilitate activities of daily living skills and compensatory activities.      Date: 1/21/22 (visit 26) 1/24/22 (visit 27) 1/26/22 (visit 28) 1/28/22 (visit 29)    Modalities:                                Therapeutic Exercise: 36 min 36 min 40 min 40 min     R MCP/PIP/DIP flexion/extension, thumb IP/MCP and cmc flexion, abduction and opposition (combined ROM stretches) x 20 min R MCP/PIP/DIP flexion/extension, thumb IP/MCP and cmc flexion, abduction and opposition (combined ROM stretches) x 20 min End range flexion with extension stretch rest breaks: thumb IP/MCP/CMC, index and ring PIP/DIP/MCP flexions (individual and combined flexion): 15 min Wide gripping using hand web green x 2 min     R slow rep digits 2-3 flexions 2 x 10 rep Ball squeezes w/ IP emphasis x 30 Hand squeezes; 2 min AROM, 2 min into green ball End range R thumb IP/MCP and fingers 2-5 IP's/MCP flexion stretching (RODRIGEZ and individual : 20 min     R thumb full range oppositions x 10 x 5\"  Manl resist index/ring composite flexion: 3 x 12, f b/ 2 x 10 \" holds in flexion,  thumb composite flexion x 12 Manual resist thumb opposition x 15, 2nd/3rd finger flexions x 15     R thumb circumductions  X 4 x 10  R thumb circumductions  X 4 x 15 THerabar: pronation w/ green bar x 10, red bar x 30 Therabar red: 30x pronation bilat     Therabar red: 2 x 15 pronation bilat Therabar red: 30x pronation bilat Therabar supination w/ green bar x 10, red bar x 30 Therabar red 30x supinations bilat     Therabar red 2 x 15 supinations bilat Therabar red 30x supinations bilat Therabar red alternating wrist flexion/extension 20 pronated,  Therabar red alternating wrist flexion/extensions x 30  pronated,      Therabar red alternating wrist flexion/extension 2 x 10 pronated, 1 x 10 supinated Therabar red alternating wrist flexion/extension 20 pronated,  End range wrist extension stretch x 2 min, supination stretch x 2 min Thumb-index pinches w/ red clip x 30     Therabar red /pulls x 5 x 5\" Therabar red /pulls x 10 x 5\" Thumb-index pinches w/ red clip x 30 End range wrist extension stretch x 2 min,     Passive wrist extn stretch x 2 min Paper pinch index/thumb pulls 10 x 5\"  Index/ring finger static extension stretch hold x 2 min      Passive wrist extn stretch x 2 min        manl resist thumb opposition x 10              Proprioceptive Activities:                                Manual Therapy: 8 min 8 min 5 min 5 min     R MCP/PIP/DIP distractions digits 2-3-4-5, gr. 3, wrist palmar glides gr. 3  R MCP/PIP/DIP distractions digits 2-3-4-5, gr. 3, r THUMB ip/mcp/cmc distraction gr. 3, wrist distraction gr. 3.  R index and ring finger long axis tractioning (MCP-IP's) R Thumb, index and ring finger long axis tractioning (MCP-IP's)                            Therapeutic Activities:                                  HEP: Exercises  Exercises  Seated Wrist Extension PROM - 2 x daily - 7 x weekly - 5 reps - 15 second hold  Seated Wrist Prayer Stretch - 2 x daily - 7 x weekly - 5 sets - 15 second hold  Seated Wrist Flexion with Overpressure - 2 x daily - 7 x weekly - 10 reps - 15 second hold  Seated Finger Composite Flexion Stretch - 2 x daily - 7 x weekly - 3 reps - 2 minuessccond hold  Wrist Pronation Stretch - 2 x daily - 7 x weekly - 5 sets - 15 second hold  Seated Wrist Supination Stretch - 2 x daily - 7 x weekly - 5 sets - 15 second hold    Added 12/10/2021: Emphasize 5 to 10 min holds on steady finger flexion stretch (MCP/IP to palm). OK to work on thumb-finger pinch using clothespin squeeze.    1/5/22: hand compression garments issued (incrediwear), wear as much as tolerated, including at night. [employer]  Treatment/Session Summary:    · Response to Treatment: Able to pinch to index finger pad (not tip) for first time. · Communication/Consultation:  None today  · Equipment provided today:  None today   · Recommendations/Intent for next treatment session: Emphasis on a/p/rrom to regain flexor tendon/finger flexion RODRIGEZ for normal /grasp, pronation/supination. Manual therapy for joint and tendon mobility.      Total Treatment Billable Duration:  45 minutes  PT Patient Time In/Time Out  Time In: 1100  Time Out: [facility],     Future Appointments   Date Time Provider Kamille Reis   1/31/2022 11:45 AM Iftikhar Landin, PT [facility]   2/2/2022 11:00 AM Iftikhar Landin, PT [facility]   2/7/2022 11:00 AM Iftikhar Landin, PT [facility]   2/9/2022 11:00 AM Iftikhar Landin, PT [facility]   2/14/2022 11:00 AM Iftikhar Landin, PT [facility]   2/16/2022 11:00 AM Iftikhar Landin, PT [facility]   2/21/2022 11:00 AM Iftikhar Landin, PT [facility]   2/23/2022 11:00 AM Iftikhar Landin, PT [facility]   2/28/2022 11:00 AM Talita Monaco, PT [facility]

## 2022-01-31 ENCOUNTER — HOSPITAL ENCOUNTER (OUTPATIENT)
Dept: PHYSICAL THERAPY | Age: 82
Discharge: HOME | End: 2022-01-31
Payer: COMMERCIAL

## 2022-01-31 PROCEDURE — 97110 THERAPEUTIC EXERCISES: CPT

## 2022-01-31 NOTE — PROGRESS NOTES
Danis Black  : 1940  Primary: [employer]  Secondary:  [address] @ [place] [address].  [phone]   [phone]      OUTPATIENT PHYSICAL THERAPY:  Daily Treatment Note  2022   ICD-10: Treatment Diagnosis: Pain in right wrist (M25.531), Stiffness of right wrist, not elsewhere classified (M25.631), Pain in left wrist (M25.532), Stiffness of left wrist, not elsewhere classified (M25.632), Stiffness of right hand, not elsewhere classified (M25.641) and Stiffness of left hand, not elsewhere classified (M25.642)  MEDICAL/REFERRING DIAGNOSIS:  Status post bilateral distal radius fractures   TREATMENT PLAN:  Effective Dates: 2022TO 3/2//2022 (60 days). Frequency/Duration: 2 times a week for 60 Days  GOALS: (Goals have been discussed and agreed upon with patient.)  Short-Term Functional Goals: Time Frame: 4 weeks:   1. Independent performance of home exercise program (MET)  2. Reduce R hand edema to normal levels to allow normal finger to palm fist motions (Progressing)  3. Increase R wrist/hand ROM's to 75% or better of normal  In all planes of motion (Progressing)  Discharge Goals: Time Frame: 12 weeks  1. Pt to demonstrate at least 35#   Strength L/R for normal weight carrying (progressing)  2. Pt to demonstrate L/R wrist/ hand ROM's 85% or better of normal all planes (progressing)  3. Improve Quick-DASH scores to 15/55 or better to reflect return to normal ADL function (progressing)  Rehabilitation Potential For Stated Goals: Good  _______________________________________________________________________  Pre-treatment Symptoms/Complaints: Pt was back to therapy earlier today - states that her surgeon urged her to redouble her efforts with her hand rehab and to work it frequently when outside of therapy sessions. She states that he was pleased with her wrist ROM.    Pain: Initial: 5/10 Post Session: No increase/10   Medications Last Reviewed:  1/31/2022  Updated Objective Findings:    Observation/Orthostatic Postural Assessment:    Pt is wearing right hand compressive glove. Palpation:    Diffuse right digital swelling, mild dorsal R hand swelling (12/29/2021)  ROM:    Left : Pt demonstrates ability to perform full left hook, open and full fists. AROM's : wrist flexion = 40 deg, extension = 45 deg, pronation/supination 75% of full AROM, Ulnar deviation = 18 deg, radial deviation = 20 deg. Right: Passive Cristina Mandril digit ROM's: 2: passive RODRIGEZ now 75%. (12/22/2021), MCP flexion at index = 90 deg (1/22/22) , Thumb IP = full extension, 50% flexion, MCP = full extension, 50% flexion. CMC: 25% flexion. CMC radial abduction 40 deg. Wrist flexion P/AROM = 55 deg (1/5/2022), extension A/PROM = 40 deg/48 deg  Deg (1/5/22), Ulnar deviation = 15 deg PROM, radial deviation = to 15 deg. R forearm active/passive. pronation/supination  = ~ 60 deg to 75 deg (12/29/2021). Dexfbmflom-rt-xvpglbkt palmar crease measures: R hand: index and ring 4.5 cm (1/31/22)  Strength:   Left:  digits all 4-/5, Wrist flex/ext all 4/5  Right: digits all 3-/5, wrist flex/ext 4-/5   (1/19/22): 3rd run dynamometer: left = 32 #, right = 15#. 1/28/22: right = 15#   TREATMENT:   THERAPEUTIC ACTIVITY: ( see below for minutes): Therapeutic activities per grid below to improve mobility, strength, balance and coordination. Required minimal visual, verbal, manual and tactile cues to improve independence and safety with daily activities . THERAPEUTIC EXERCISE: (see below for minutes):  Exercises per grid below to improve mobility, strength, balance and coordination. Required minimal verbal and manual cues to promote proper body alignment, promote proper body posture and promote proper body mechanics. Progressed resistance, range, repetitions and complexity of movement as indicated.   MANUAL THERAPY: (see below for minutes): Joint mobilization and Soft tissue mobilization was utilized and necessary because of the patient's restricted joint motion, painful spasm, loss of articular motion and restricted motion of soft tissue. MODALITIES: (see below for minutes):      to decrease pain    SELF CARE: (see below for minutes): Procedure(s) (per grid) utilized to improve and/or restore self-care/home management as related to dressing, bathing and grooming. Required minimal verbal cueing to facilitate activities of daily living skills and compensatory activities.      Date: 1/21/22 (visit 26) 1/24/22 (visit 27) 1/26/22 (visit 28) 1/28/22 (visit 29) 1/31/22 (visit 30)   Modalities:                                Therapeutic Exercise: 36 min 36 min 40 min 40 min 53 min    R MCP/PIP/DIP flexion/extension, thumb IP/MCP and cmc flexion, abduction and opposition (combined ROM stretches) x 20 min R MCP/PIP/DIP flexion/extension, thumb IP/MCP and cmc flexion, abduction and opposition (combined ROM stretches) x 20 min End range flexion with extension stretch rest breaks: thumb IP/MCP/CMC, index and ring PIP/DIP/MCP flexions (individual and combined flexion): 15 min Wide gripping using hand web green x 2 min Coban wrapping for static flexion stretch: Digits 4-5 x 8 min, Digits 2-3 x 5 min    R slow rep digits 2-3 flexions 2 x 10 rep Ball squeezes w/ IP emphasis x 30 Hand squeezes; 2 min AROM, 2 min into green ball End range R thumb IP/MCP and fingers 2-5 IP's/MCP flexion stretching (RODRIGEZ and individual : 20 min End range stretching: flexion: PIP/DIP/MCP digits 2-5, individual and combined, emphasis on full fist and hook fists x 15 min     R thumb full range oppositions x 10 x 5\"  Manl resist index/ring composite flexion: 3 x 12, f b/ 2 x 10 \" holds in flexion,  thumb composite flexion x 12 Manual resist thumb opposition x 15, 2nd/3rd finger flexions x 15 End range thumb stretching: CMC/MCP/IP for full flexion and opposition : 5 min    R thumb circumductions  X 4 x 10  R thumb circumductions  X 4 x 15 THerabar: pronation w/ green bar x 10, red bar x 30 Therabar red: 30x pronation bilat Resisted finger flexion: digits 2-3 x 2 x 15, digits 4-5 x 15    Therabar red: 2 x 15 pronation bilat Therabar red: 30x pronation bilat Therabar supination w/ green bar x 10, red bar x 30 Therabar red 30x supinations bilat Resisted finger extension x 15     Therabar red 2 x 15 supinations bilat Therabar red 30x supinations bilat Therabar red alternating wrist flexion/extension 20 pronated,  Therabar red alternating wrist flexion/extensions x 30  pronated,  Putty yellow pinching: thumb to index x 15, thumb to index/middle x 15    Therabar red alternating wrist flexion/extension 2 x 10 pronated, 1 x 10 supinated Therabar red alternating wrist flexion/extension 20 pronated,  End range wrist extension stretch x 2 min, supination stretch x 2 min Thumb-index pinches w/ red clip x 30 Finger adduction w/ yellow putty: 10 each at 2-3, 3-4, 4-5    Therabar red /pulls x 5 x 5\" Therabar red /pulls x 10 x 5\" Thumb-index pinches w/ red clip x 30 End range wrist extension stretch x 2 min, Yellow ball gripping x 10, thumb flexion x 20    Passive wrist extn stretch x 2 min Paper pinch index/thumb pulls 10 x 5\"  Index/ring finger static extension stretch hold x 2 min Therabar red:  pronations x 30, supinations x 30, wrist flexion/extn x 2 x 10      Passive wrist extn stretch x 2 min   R hand therabar /pulling x 10 x 5\"      manl resist thumb opposition x 10              Proprioceptive Activities:                                Manual Therapy: 8 min 8 min 5 min 5 min     R MCP/PIP/DIP distractions digits 2-3-4-5, gr. 3, wrist palmar glides gr. 3  R MCP/PIP/DIP distractions digits 2-3-4-5, gr. 3, r THUMB ip/mcp/cmc distraction gr. 3, wrist distraction gr.  3.  R index and ring finger long axis tractioning (MCP-IP's) R Thumb, index and ring finger long axis tractioning (MCP-IP's) Therapeutic Activities:                                  HEP: Exercises  Exercises  Seated Wrist Extension PROM - 2 x daily - 7 x weekly - 5 reps - 15 second hold  Seated Wrist Prayer Stretch - 2 x daily - 7 x weekly - 5 sets - 15 second hold  Seated Wrist Flexion with Overpressure - 2 x daily - 7 x weekly - 10 reps - 15 second hold  Seated Finger Composite Flexion Stretch - 2 x daily - 7 x weekly - 3 reps - 2 minuessccond hold  Wrist Pronation Stretch - 2 x daily - 7 x weekly - 5 sets - 15 second hold  Seated Wrist Supination Stretch - 2 x daily - 7 x weekly - 5 sets - 15 second hold    Added 12/10/2021: Emphasize 5 to 10 min holds on steady finger flexion stretch (MCP/IP to palm). OK to work on thumb-finger pinch using clothespin squeeze. 1/5/22: hand compression garments issued ([employer]wear), wear as much as tolerated, including at night. [employer]  Treatment/Session Summary:    · Response to Treatment: Use fo static hold stretching introduced today - overall combined finger flexion better and able to flex each finger MCP to approximately 90 deg by end of reatment  · Communication/Consultation:  None today  · Equipment provided today:  None today   · Recommendations/Intent for next treatment session: Emphasis on a/p/rrom to regain flexor tendon/finger flexion RODRIGEZ for normal /grasp, pronation/supination. Manual therapy for joint and tendon mobility.      Total Treatment Billable Duration:  53 minutes  PT Patient Time In/Time Out  Time In: 2099  Time Out: [id number] [facility], PT    Future Appointments   Date Time Provider Kamille Reis   2/2/2022 11:00 AM Ivette Rodríguez PT [facility]   2/7/2022 11:00 AM Ivette Rodríguez PT [facility]   2/9/2022 11:00 AM HARLEY [facility]   2/14/2022 11:00 AM Ivette Rodríguez PT [facility]   2/16/2022 11:00 AM Ivette Rodríguez PT [facility]   2/21/2022 11:00 AM Hennie Epley, Haskell Spaniel, PT [facility]   2/23/2022 11:00 AM Terri Jack, HARLEY [facility]   2/28/2022 11:00 AM Hiwot Velasquez, PT [facility]

## 2022-02-02 ENCOUNTER — HOSPITAL ENCOUNTER (OUTPATIENT)
Dept: PHYSICAL THERAPY | Age: 82
Discharge: HOME | End: 2022-02-02
Payer: COMMERCIAL

## 2022-02-02 PROCEDURE — 97110 THERAPEUTIC EXERCISES: CPT

## 2022-02-02 NOTE — PROGRESS NOTES
Grace Black  : 1940  Primary: [facility]  Secondary:  [address] @ [place] [address].  [phone]   [phone]      OUTPATIENT PHYSICAL THERAPY:  Daily Treatment Note  2022   ICD-10: Treatment Diagnosis: Pain in right wrist (M25.531), Stiffness of right wrist, not elsewhere classified (M25.631), Pain in left wrist (M25.532), Stiffness of left wrist, not elsewhere classified (M25.632), Stiffness of right hand, not elsewhere classified (M25.641) and Stiffness of left hand, not elsewhere classified (M25.642)  MEDICAL/REFERRING DIAGNOSIS:  Status post bilateral distal radius fractures   TREATMENT PLAN:  Effective Dates: 2022TO 3/2//2022 (60 days). Frequency/Duration: 2 times a week for 60 Days  GOALS: (Goals have been discussed and agreed upon with patient.)  Short-Term Functional Goals: Time Frame: 4 weeks:   1. Independent performance of home exercise program (MET)  2. Reduce R hand edema to normal levels to allow normal finger to palm fist motions (Progressing)  3. Increase R wrist/hand ROM's to 75% or better of normal  In all planes of motion (Progressing)  Discharge Goals: Time Frame: 12 weeks  1. Pt to demonstrate at least 35#   Strength L/R for normal weight carrying (progressing)  2. Pt to demonstrate L/R wrist/ hand ROM's 85% or better of normal all planes (progressing)  3. Improve Quick-DASH scores to 15/55 or better to reflect return to normal ADL function (progressing)  Rehabilitation Potential For Stated Goals: Good  _______________________________________________________________________  Pre-treatment Symptoms/Complaints: Fingers were painful the day after the last treatment but taking Advil relieved the pain.    Pain: Initial: 510 Post Session:  No increase/10   Medications Last Reviewed:  2022  Updated Objective Findings:    Observation/Orthostatic Postural Assessment:    Pt is wearing right hand compressive glove. Palpation:    Diffuse right digital swelling, mild dorsal R hand swelling (12/29/2021)  ROM:    Left : Pt demonstrates ability to perform full left hook, open and full fists. AROM's : wrist flexion = 40 deg, extension = 45 deg, pronation/supination 75% of full AROM, Ulnar deviation = 18 deg, radial deviation = 20 deg. Right: Passive [place] digit ROM's: 2: passive RODRIGEZ now 75%. (12/22/2021), MCP flexion at index = 90 deg (1/22/22) , Thumb IP = full extension, 50% flexion, MCP = full extension, 50% flexion. CMC: 25% flexion. CMC radial abduction 40 deg. Wrist flexion P/AROM = 55 deg (1/5/2022), extension A/PROM = 40 deg/48 deg  Deg (1/5/22), Ulnar deviation = 15 deg PROM, radial deviation = to 15 deg. R forearm active/passive. pronation/supination  = ~ 60 deg to 75 deg (12/29/2021). Iikujzgusj-lu-hgimizlt palmar crease measures: R hand: index and ring 4.5 cm (1/31/22)  Strength:   Left:  digits all 4-/5, Wrist flex/ext all 4/5  Right: digits all 3-/5, wrist flex/ext 4-/5   (1/19/22): 3rd run dynamometer: left = 32 #, right = 15#. 1/28/22: right = 15#   TREATMENT:   THERAPEUTIC ACTIVITY: ( see below for minutes): Therapeutic activities per grid below to improve mobility, strength, balance and coordination. Required minimal visual, verbal, manual and tactile cues to improve independence and safety with daily activities . THERAPEUTIC EXERCISE: (see below for minutes):  Exercises per grid below to improve mobility, strength, balance and coordination. Required minimal verbal and manual cues to promote proper body alignment, promote proper body posture and promote proper body mechanics. Progressed resistance, range, repetitions and complexity of movement as indicated.   MANUAL THERAPY: (see below for minutes): Joint mobilization and Soft tissue mobilization was utilized and necessary because of the patient's restricted joint motion, painful spasm, loss of articular motion and restricted motion of soft tissue. MODALITIES: (see below for minutes):      to decrease pain    SELF CARE: (see below for minutes): Procedure(s) (per grid) utilized to improve and/or restore self-care/home management as related to dressing, bathing and grooming. Required minimal verbal cueing to facilitate activities of daily living skills and compensatory activities.      Date: 1/21/22 (visit 26) 1/24/22 (visit 27) 1/26/22 (visit 28) 1/28/22 (visit 29) 1/31/22 (visit 30) 2/2/22 (visit 31)   Modalities:                                    Therapeutic Exercise: 36 min 36 min 40 min 40 min 53 min 38  min    R MCP/PIP/DIP flexion/extension, thumb IP/MCP and cmc flexion, abduction and opposition (combined ROM stretches) x 20 min R MCP/PIP/DIP flexion/extension, thumb IP/MCP and cmc flexion, abduction and opposition (combined ROM stretches) x 20 min End range flexion with extension stretch rest breaks: thumb IP/MCP/CMC, index and ring PIP/DIP/MCP flexions (individual and combined flexion): 15 min Wide gripping using hand web green x 2 min Coban wrapping for static flexion stretch: Digits 4-5 x 8 min, Digits 2-3 x 5 min Coban wrapping for static flexion stretch: Digits 4-5 x 10 min, Digits 2-3 x 5 min    R slow rep digits 2-3 flexions 2 x 10 rep Ball squeezes w/ IP emphasis x 30 Hand squeezes; 2 min AROM, 2 min into green ball End range R thumb IP/MCP and fingers 2-5 IP's/MCP flexion stretching (RODRIGEZ and individual : 20 min End range stretching: flexion: PIP/DIP/MCP digits 2-5, individual and combined, emphasis on full fist and hook fists x 15 min  End range stretching: flexion: PIP/DIP/MCP digits 2-5, individual and combined, emphasis on full fist and hook fists x 15 min     R thumb full range oppositions x 10 x 5\"  Manl resist index/ring composite flexion: 3 x 12, f b/ 2 x 10 \" holds in flexion,  thumb composite flexion x 12 Manual resist thumb opposition x 15, 2nd/3rd finger flexions x 15 End range thumb stretching: CMC/MCP/IP for full flexion and opposition : 5 min End range thumb stretching: CMC/MCP/IP for full flexion and opposition : 3 min    R thumb circumductions  X 4 x 10  R thumb circumductions  X 4 x 15 THerabar: pronation w/ green bar x 10, red bar x 30 Therabar red: 30x pronation bilat Resisted finger flexion: digits 2-3 x 2 x 15, digits 4-5 x 15 Resisted finger flexion: digits 2-3 x 2 x 15, all digits x 15    Therabar red: 2 x 15 pronation bilat Therabar red: 30x pronation bilat Therabar supination w/ green bar x 10, red bar x 30 Therabar red 30x supinations bilat Resisted finger extension x 15  Gripper flexions  X 20    Therabar red 2 x 15 supinations bilat Therabar red 30x supinations bilat Therabar red alternating wrist flexion/extension 20 pronated,  Therabar red alternating wrist flexion/extensions x 30  pronated,  Putty yellow pinching: thumb to index x 15, thumb to index/middle x 15 Putty yellow pinching: thumb to index x 15, thumb to index/middle x 15    Therabar red alternating wrist flexion/extension 2 x 10 pronated, 1 x 10 supinated Therabar red alternating wrist flexion/extension 20 pronated,  End range wrist extension stretch x 2 min, supination stretch x 2 min Thumb-index pinches w/ red clip x 30 Finger adduction w/ yellow putty: 10 each at 2-3, 3-4, 4-5 Yellow ball thumb flexion x 20    Therabar red /pulls x 5 x 5\" Therabar red /pulls x 10 x 5\" Thumb-index pinches w/ red clip x 30 End range wrist extension stretch x 2 min, Yellow ball gripping x 10, thumb flexion x 20 All finger extension stretching x 2 min    Passive wrist extn stretch x 2 min Paper pinch index/thumb pulls 10 x 5\"  Index/ring finger static extension stretch hold x 2 min Therabar red:  pronations x 30, supinations x 30, wrist flexion/extn x 2 x 10       Passive wrist extn stretch x 2 min   R hand therabar /pulling x 10 x 5\"       manl resist thumb opposition x 10                Proprioceptive Activities: Manual Therapy: 8 min 8 min 5 min 5 min  5 min    R MCP/PIP/DIP distractions digits 2-3-4-5, gr. 3, wrist palmar glides gr. 3  R MCP/PIP/DIP distractions digits 2-3-4-5, gr. 3, r THUMB ip/mcp/cmc distraction gr. 3, wrist distraction gr. 3.  R index and ring finger long axis tractioning (MCP-IP's) R Thumb, index and ring finger long axis tractioning (MCP-IP's)  R Thumb, index and ring finger long axis tractioning (MCP-IP's                              Therapeutic Activities:                                      HEP: Exercises  Exercises  Seated Wrist Extension PROM - 2 x daily - 7 x weekly - 5 reps - 15 second hold  Seated Wrist Prayer Stretch - 2 x daily - 7 x weekly - 5 sets - 15 second hold  Seated Wrist Flexion with Overpressure - 2 x daily - 7 x weekly - 10 reps - 15 second hold  Seated Finger Composite Flexion Stretch - 2 x daily - 7 x weekly - 3 reps - 2 minuessccond hold  Wrist Pronation Stretch - 2 x daily - 7 x weekly - 5 sets - 15 second hold  Seated Wrist Supination Stretch - 2 x daily - 7 x weekly - 5 sets - 15 second hold    Added 12/10/2021: Emphasize 5 to 10 min holds on steady finger flexion stretch (MCP/IP to palm). OK to work on thumb-finger pinch using clothespin squeeze. 1/5/22: hand compression garments issued ([employer]), wear as much as tolerated, including at night. [employer]  Treatment/Session Summary:    · Response to Treatment: Improvement - able to again flex MCP's 2-5 to 90 deg point and create full fist position to ~ 80%+ of normal ROM  · Communication/Consultation:  None today  · Equipment provided today:  None today   · Recommendations/Intent for next treatment session: Emphasis on a/p/rrom to regain flexor tendon/finger flexion RODRIGEZ for normal /grasp, pronation/supination. Manual therapy for joint and tendon mobility.      Total Treatment Billable Duration:  43 minutes  PT Patient Time In/Time Out  Time In: 1100  Time Out: 1145  Viral LAWRENCE Triston Arevalo, PT    Future Appointments   Date Time Provider Kamille Smiley   2/7/2022 11:00 AM Floy Lime, PT [facility]   2/9/2022 11:00 AM Floy Lime, PT [facility]   2/11/2022 11:00 AM Floy Lime, PT [facility]   2/14/2022 11:00 AM Floy Lime, PT [facility]   2/16/2022 11:00 AM Floy Lime, PT [facility]   2/18/2022 11:00 AM Floy Lime, PT [facility]   2/21/2022 11:00 AM Floy Lime, PT [facility]   2/23/2022 11:00 AM Floy Lime, PT [facility]   2/25/2022 11:00 AM Floy Lime, PT [facility]   2/28/2022 11:00 AM Rema Miller, PT [facility]

## 2022-02-07 ENCOUNTER — HOSPITAL ENCOUNTER (OUTPATIENT)
Dept: PHYSICAL THERAPY | Age: 82
Discharge: HOME | End: 2022-02-07
Payer: COMMERCIAL

## 2022-02-07 PROCEDURE — 97110 THERAPEUTIC EXERCISES: CPT

## 2022-02-07 NOTE — PROGRESS NOTES
Tito Black  : 1940  Primary: [facility]  Secondary:  [facility] @ [address]  [phone]   [phone]      OUTPATIENT PHYSICAL THERAPY: Progress Report/ Daily Treatment Note  2022   ICD-10: Treatment Diagnosis: Pain in right wrist (M25.531), Stiffness of right wrist, not elsewhere classified (M25.631), Pain in left wrist (M25.532), Stiffness of left wrist, not elsewhere classified (M25.632), Stiffness of right hand, not elsewhere classified (M25.641) and Stiffness of left hand, not elsewhere classified (M25.642)  MEDICAL/REFERRING DIAGNOSIS:  Status post bilateral distal radius fractures   TREATMENT PLAN:  Effective Dates: 2022TO 3/2//2022 (60 days). Frequency/Duration: 2 times a week for 60 Days  GOALS: (Goals have been discussed and agreed upon with patient.)  Short-Term Functional Goals: Time Frame: 4 weeks:   1. Independent performance of home exercise program (MET)  2. Reduce R hand edema to normal levels to allow normal finger to palm fist motions (Progressing)  3. Increase R wrist/hand ROM's to 75% or better of normal  In all planes of motion (MET)  Discharge Goals: Time Frame: 12 weeks  1. Pt to demonstrate at least 35#   Strength L/R for normal weight carrying (progressing)  2. Pt to demonstrate L/R wrist/ hand ROM's 85% or better of normal all planes (progressing)  3. Improve Quick-DASH scores to 15/55 or better to reflect return to normal ADL function (progressing)  Rehabilitation Potential For Stated Goals: Good  _______________________________________________________________________  Pre-treatment Symptoms/Complaints: Finger stiffness continues to limit her. She's staying active with exercising the hand and trying to use it for ADL's throughout the day. After therapy sessions, the stiffness returns.    Pain: Initial: 5/10 Post Session:  No increase/10   Medications Last Reviewed:  2/7/2022  Updated Objective Findings:    Observation/Orthostatic Postural Assessment:    Pt is wearing right hand compressive glove. Palpation:    Diffuse right digital swelling, mild dorsal R hand swelling (12/29/2021)  ROM:    Left : Pt demonstrates ability to perform full left hook, open and full fists. AROM's : wrist flexion = 40 deg, extension = 45 deg, pronation/supination 75% of full AROM, Ulnar deviation = 18 deg, radial deviation = 20 deg. Right: Passive [place] digit ROM's: 2: passive RODRIGEZ now 75%. (12/22/2021), MCP flexion at index = 90 deg (1/22/22) , Thumb IP = full extension, 50% flexion, MCP = full extension, 50% flexion. CMC: 25% flexion. CMC radial abduction 40 deg. Wrist flexion P/AROM = 55 deg (1/5/2022), extension A/PROM = 40 deg/48 deg  Deg (1/5/22), Ulnar deviation = 15 deg PROM, radial deviation = to 15 deg. R forearm active/passive. pronation/supination  = ~ 60 deg to 75 deg (12/29/2021). Qsbqkyvtet-gf-hbdwqqhc palmar crease measures: R hand: index and ring 4.5 cm (1/31/22)  Strength:   Left:  digits all 4-/5, Wrist flex/ext all 4/5  Right: digits all 3-/5, wrist flex/ext 4-/5   (1/19/22): 3rd run dynamometer: left = 32 #, right = 15#. 1/28/22: right = 15#    2/7/22: Fingernail to palm finger flexion measures R hand: Index = 3.5cm, Middle: 3.5 cm, Ring: 3.4 cm, 5th finger: 3.4 cm. Able to flex each MCP at digits 2-5 to 90 deg, and at each PIP to 90 deg, each w/stiff end feel. Able to lateral pinch with index to middle phalanx and tip, able to pinch to tip of middle digit with effort.  strength at 3rd rung = 17#. Quick-DASH score: 29/55     TREATMENT:   THERAPEUTIC ACTIVITY: ( see below for minutes): Therapeutic activities per grid below to improve mobility, strength, balance and coordination. Required minimal visual, verbal, manual and tactile cues to improve independence and safety with daily activities .   THERAPEUTIC EXERCISE: (see below for minutes): Exercises per grid below to improve mobility, strength, balance and coordination. Required minimal verbal and manual cues to promote proper body alignment, promote proper body posture and promote proper body mechanics. Progressed resistance, range, repetitions and complexity of movement as indicated. MANUAL THERAPY: (see below for minutes): Joint mobilization and Soft tissue mobilization was utilized and necessary because of the patient's restricted joint motion, painful spasm, loss of articular motion and restricted motion of soft tissue. MODALITIES: (see below for minutes):      to decrease pain    SELF CARE: (see below for minutes): Procedure(s) (per grid) utilized to improve and/or restore self-care/home management as related to dressing, bathing and grooming. Required minimal verbal cueing to facilitate activities of daily living skills and compensatory activities. Date: 2/7/22 (visit 32/PN)        Modalities:                                    Therapeutic Exercise: 40 min         Static stretch- full fist with 5 minute hold at digits 2-5         Static stretch Digit 2-5 flexion at MCP's: 5 min         Flexion stretch combined thumb (IP, MCP/CMC flexion ) x 2 min         Manual resist individual digits 2 5- flexion x 15 reps, with end range flexion holds x 5 \"         Finger extension stretching digits 2-3 (MCP/IP's_         Red theraball gripping  W/ 2\" holds x 4 min. Manl resist thumb flexion's x 15         Tangier pickups w/ index/thumb x 15         Alternating finger abd/add's w/ manl resist 15 each digit. Lateral pinch w/ spring clip: 15 w/ index, 15 w/ index/middle.                            Proprioceptive Activities:                                    Manual Therapy:                                             Therapeutic Activities:                                      HEP: Exercises  Exercises  Seated Wrist Extension PROM - 2 x daily - 7 x weekly - 5 reps - 15 second hold  Seated Wrist Prayer Stretch - 2 x daily - 7 x weekly - 5 sets - 15 second hold  Seated Wrist Flexion with Overpressure - 2 x daily - 7 x weekly - 10 reps - 15 second hold  Seated Finger Composite Flexion Stretch - 2 x daily - 7 x weekly - 3 reps - 2 minuessccond hold  Wrist Pronation Stretch - 2 x daily - 7 x weekly - 5 sets - 15 second hold  Seated Wrist Supination Stretch - 2 x daily - 7 x weekly - 5 sets - 15 second hold    Added 12/10/2021: Emphasize 5 to 10 min holds on steady finger flexion stretch (MCP/IP to palm). OK to work on thumb-finger pinch using clothespin squeeze. 1/5/22: hand compression garments issued ([employer]), wear as much as tolerated, including at night. 2/7/22: start static hold full fist stretches x 5 min, 3x/day, and MCP flexion stretches w/ holds x 5 min. [employer] Portal  Treatment/Session Summary:    · Response to Treatment: STG's mostly met at this point - slowly closing in on regaining full fist ROM. Pt able to articulate marbles with thumb/index/middle pinching. · Communication/Consultation:  None today  · Equipment provided today:  None today   · Recommendations/Intent for next treatment session: Emphasis on a/p/rrom to regain flexor tendon/finger flexion RODRIGEZ for normal /grasp, pronation/supination. Manual therapy for joint and tendon mobility.      Total Treatment Billable Duration:  40 minutes  PT Patient Time In/Time Out  Time In: 1100  Time Out: 250 [facility], PT    Future Appointments   Date Time Provider Kamille Reis   2/9/2022 11:00 AM Terri Margyle, PT [facility]   2/11/2022 11:00 AM Terri Margyle, PT [facility]   2/14/2022 11:00 AM Terri Margyle, PT [facility]   2/16/2022 11:00 AM Terri Margyle, PT [facility]   2/18/2022 11:00 AM Terri Margyle, PT [facility]   2/21/2022 11:00 AM Terri Margyle, PT [facility]   2/23/2022 11:00 AM Hiwot Velasquez, PT [facility] 2/25/2022 11:00 AM [facility]les[facility]   2/28/2022 11:00 AM Wai Brown, [facility]

## 2022-02-09 ENCOUNTER — HOSPITAL ENCOUNTER (OUTPATIENT)
Dept: PHYSICAL THERAPY | Age: 82
Discharge: HOME | End: 2022-02-09
Payer: COMMERCIAL

## 2022-02-09 PROCEDURE — 97110 THERAPEUTIC EXERCISES: CPT

## 2022-02-09 NOTE — PROGRESS NOTES
Rolene Koyanagi Pate  : 1940  Primary: [facility]  Secondary:  [address] @ [place] [address].  [phone]   [phone]      OUTPATIENT PHYSICAL THERAPY: Daily Treatment Note  2022   ICD-10: Treatment Diagnosis: Pain in right wrist (M25.531), Stiffness of right wrist, not elsewhere classified (M25.631), Pain in left wrist (M25.532), Stiffness of left wrist, not elsewhere classified (M25.632), Stiffness of right hand, not elsewhere classified (M25.641) and Stiffness of left hand, not elsewhere classified (M25.642)  MEDICAL/REFERRING DIAGNOSIS:  Status post bilateral distal radius fractures   TREATMENT PLAN:  Effective Dates: 2022TO 3/2//2022 (60 days). Frequency/Duration: 2 times a week for 60 Days  GOALS: (Goals have been discussed and agreed upon with patient.)  Short-Term Functional Goals: Time Frame: 4 weeks:   1. Independent performance of home exercise program (MET)  2. Reduce R hand edema to normal levels to allow normal finger to palm fist motions (Progressing)  3. Increase R wrist/hand ROM's to 75% or better of normal  In all planes of motion (MET)  Discharge Goals: Time Frame: 12 weeks  1. Pt to demonstrate at least 35#   Strength L/R for normal weight carrying (progressing)  2. Pt to demonstrate L/R wrist/ hand ROM's 85% or better of normal all planes (progressing)  3. Improve Quick-DASH scores to 15/55 or better to reflect return to normal ADL function (progressing)  Rehabilitation Potential For Stated Goals: Good  _______________________________________________________________________  Pre-treatment Symptoms/Complaints: Wants to measure stiffness sin fingers at start of treatment today and compare this to stiffness at her first visit next week. Feels ready to try to start driving next week and plans to do so shorter distances with another person in the car with her.     Pain: Initial: 5/10 Post Session:  No increase/10   Medications Last Reviewed:  2/9/2022  Updated Objective Findings:    Observation/Orthostatic Postural Assessment:    Pt is wearing right hand compressive glove. Palpation:    Diffuse right digital swelling, mild dorsal R hand swelling (12/29/2021)  ROM:    Left : Pt demonstrates ability to perform full left hook, open and full fists. AROM's : wrist flexion = 40 deg, extension = 45 deg, pronation/supination 75% of full AROM, Ulnar deviation = 18 deg, radial deviation = 20 deg. Right: Passive Macy Shelling digit ROM's: 2: passive RODRIGEZ now 75%. (12/22/2021), MCP flexion at index = 90 deg (1/22/22) , Thumb IP = full extension, 50% flexion, MCP = full extension, 50% flexion. CMC: 25% flexion. CMC radial abduction 40 deg. Wrist flexion P/AROM = 55 deg (1/5/2022), extension A/PROM = 40 deg/48 deg  Deg (1/5/22), Ulnar deviation = 15 deg PROM, radial deviation = to 15 deg. R forearm active/passive. pronation/supination  = ~ 60 deg to 75 deg (12/29/2021). Zxtmziztld-qc-bpldbeis palmar crease measures: R hand: index and ring 4.5 cm (1/31/22)  Strength:   Left:  digits all 4-/5, Wrist flex/ext all 4/5  Right: digits all 3-/5, wrist flex/ext 4-/5   (1/19/22): 3rd run dynamometer: left = 32 #, right = 15#. 1/28/22: right = 15#    2/7/22: Fingernail to palm finger flexion measures R hand: Index = 3.5cm, Middle: 3.5 cm, Ring: 3.4 cm, 5th finger: 3.4 cm. Able to flex each MCP at digits 2-5 to 90 deg, and at each PIP to 90 deg, each w/stiff end feel. Able to lateral pinch with index to middle phalanx and tip, able to pinch to tip of middle digit with effort.  strength at 3rd rung = 17#. Quick-DASH score: 29/55  2/9/22: Pre treatment:   R hand ROM's: MCP flexion# 2 = 40 deg, #3 = 40 deg, # 4 = 40 deg, #5 = 30 deg. PIP flexion: #2 = 40 deg, # 3 = 50 deg, # 4 = 52 deg, # 5 = 50 deg,   DIP fleixon: #2 = 40 deg, # 3 = 30 deg, # 4 = 30 deg, # 5 = 28 deg.    Fingernail to proximal palmar crease distances: #2 = 6cm, # 3 = 6 cm, # 4 = 5.5 cm, # 5 = 4.3 cm. Post treatment:   MCP flexion : #2 = 60 deg, # 3 = 55 deg  PIP fleixon: #2 = 88 deg, # 3 = 70 deg. TREATMENT:   THERAPEUTIC ACTIVITY: ( see below for minutes): Therapeutic activities per grid below to improve mobility, strength, balance and coordination. Required minimal visual, verbal, manual and tactile cues to improve independence and safety with daily activities . THERAPEUTIC EXERCISE: (see below for minutes):  Exercises per grid below to improve mobility, strength, balance and coordination. Required minimal verbal and manual cues to promote proper body alignment, promote proper body posture and promote proper body mechanics. Progressed resistance, range, repetitions and complexity of movement as indicated. MANUAL THERAPY: (see below for minutes): Joint mobilization and Soft tissue mobilization was utilized and necessary because of the patient's restricted joint motion, painful spasm, loss of articular motion and restricted motion of soft tissue. MODALITIES: (see below for minutes):      to decrease pain    SELF CARE: (see below for minutes): Procedure(s) (per grid) utilized to improve and/or restore self-care/home management as related to dressing, bathing and grooming. Required minimal verbal cueing to facilitate activities of daily living skills and compensatory activities.      Date: 2/7/22 (visit 32/PN) 2/9/22 (visit 33)       Modalities:                                    Therapeutic Exercise: 40 min 43 min        Static stretch- full fist with 5 minute hold at digits 2-5 Static stretch: hook fist x 5 min, MCP flexion only x 3 min        Static stretch Digit 2-5 flexion at MCP's: 5 min Flexion stretch combined thumb (IP, MCP/CMC flexion ) x 2 min and IP MCP x 2 min        Flexion stretch combined thumb (IP, MCP/CMC flexion ) x 2 min Manual resist individual digits 2 - 5 flexion and combined flexion 2-3, 2-3-4 x 15 reps, with end range flexion holds x 5 \"        Manual resist individual digits 2 5- flexion x 15 reps, with end range flexion holds x 5 \" Theraputty red hook fist gripping x 20        Finger extension stretching digits 2-3 (MCP/IP's_ L thumb flexion presses and curls (15 each)        Red theraball gripping  W/ 2\" holds x 4 min. Therabar green: pronation bilat 3 x 10 , supination 3 x 10, wrist flex/ext x 20        Manl resist thumb flexion's x 15         [place] pickups w/ index/thumb x 15         Alternating finger abd/add's w/ manl resist 15 each digit. Lateral pinch w/ spring clip: 15 w/ index, 15 w/ index/middle. Proprioceptive Activities:                                    Manual Therapy:                                             Therapeutic Activities:                                      HEP: Exercises  Exercises  Seated Wrist Extension PROM - 2 x daily - 7 x weekly - 5 reps - 15 second hold  Seated Wrist Prayer Stretch - 2 x daily - 7 x weekly - 5 sets - 15 second hold  Seated Wrist Flexion with Overpressure - 2 x daily - 7 x weekly - 10 reps - 15 second hold  Seated Finger Composite Flexion Stretch - 2 x daily - 7 x weekly - 3 reps - 2 minuessccond hold  Wrist Pronation Stretch - 2 x daily - 7 x weekly - 5 sets - 15 second hold  Seated Wrist Supination Stretch - 2 x daily - 7 x weekly - 5 sets - 15 second hold    Added 12/10/2021: Emphasize 5 to 10 min holds on steady finger flexion stretch (MCP/IP to palm). OK to work on thumb-finger pinch using clothespin squeeze. 1/5/22: hand compression garments issued (incrediwear), wear as much as tolerated, including at night. 2/7/22: start static hold full fist stretches x 5 min, 3x/day, and MCP flexion stretches w/ holds x 5 min. [employer]  Treatment/Session Summary:    · Response to Treatment: Able to achieve 80-85 % of full RODRIGEZ with finger flexion/fist formation now, but this fades after treatments. · Communication/Consultation:  None today  · Equipment provided today:  None today   · Recommendations/Intent for next treatment session: Emphasis on a/p/rrom to regain flexor tendon/finger flexion RODRIGEZ for normal /grasp, pronation/supination. Manual therapy for joint and tendon mobility.      Total Treatment Billable Duration:  43 minutes     Padmini Gonzalez, PT    Future Appointments   Date Time Provider Kamille Reis   2/9/2022 11:00 AM [facility], PT [facility]   2/11/2022 11:00 AM [facility], PT [facility]   2/14/2022 11:00 AM [facility], PT [facility]   2/16/2022 11:00 AM [facility], PT [facility]   2/18/2022 11:00 AM [facility], PT [facility]   2/21/2022 11:00 AM [facility], PT [facility]   2/23/2022 11:00 AM [facility], PT [facility]   2/25/2022 11:00 AM [facility], PT [facility]   2/28/2022 11:00 AM Saima Anguiano, PT [facility]

## 2022-02-11 ENCOUNTER — HOSPITAL ENCOUNTER (OUTPATIENT)
Dept: PHYSICAL THERAPY | Age: 82
Discharge: HOME | End: 2022-02-11
Payer: COMMERCIAL

## 2022-02-11 PROCEDURE — 97110 THERAPEUTIC EXERCISES: CPT

## 2022-02-11 NOTE — PROGRESS NOTES
Rolene Koyanagi Pate  : 1940  Primary: [facility]  Secondary:  [address] @ [place] [address].  [phone]   [phone]      OUTPATIENT PHYSICAL THERAPY: Daily Treatment Note  2022   ICD-10: Treatment Diagnosis: Pain in right wrist (M25.531), Stiffness of right wrist, not elsewhere classified (M25.631), Pain in left wrist (M25.532), Stiffness of left wrist, not elsewhere classified (M25.632), Stiffness of right hand, not elsewhere classified (M25.641) and Stiffness of left hand, not elsewhere classified (M25.642)  MEDICAL/REFERRING DIAGNOSIS:  Status post bilateral distal radius fractures   TREATMENT PLAN:  Effective Dates: 2022TO 3/2//2022 (60 days). Frequency/Duration: 2 times a week for 60 Days  GOALS: (Goals have been discussed and agreed upon with patient.)  Short-Term Functional Goals: Time Frame: 4 weeks:   1. Independent performance of home exercise program (MET)  2. Reduce R hand edema to normal levels to allow normal finger to palm fist motions (Progressing)  3. Increase R wrist/hand ROM's to 75% or better of normal  In all planes of motion (MET)  Discharge Goals: Time Frame: 12 weeks  1. Pt to demonstrate at least 35#   Strength L/R for normal weight carrying (progressing)  2. Pt to demonstrate L/R wrist/ hand ROM's 85% or better of normal all planes (progressing)  3. Improve Quick-DASH scores to 15/55 or better to reflect return to normal ADL function (progressing)  Rehabilitation Potential For Stated Goals: Good  _______________________________________________________________________  Pre-treatment Symptoms/Complaints: Very sore thumb yesterday, so she held off from performing her HEP yesterday.   Pain: Initial: 4/10 Post Session:  No increase/10   Medications Last Reviewed:  2022  Updated Objective Findings:    Observation/Orthostatic Postural Assessment:    Pt is wearing right hand compressive glove. Palpation:    Diffuse right digital swelling, mild dorsal R hand swelling (12/29/2021)  ROM:    Left : Pt demonstrates ability to perform full left hook, open and full fists. AROM's : wrist flexion = 40 deg, extension = 45 deg, pronation/supination 75% of full AROM, Ulnar deviation = 18 deg, radial deviation = 20 deg. Right: Passive Kenny Nuñez digit ROM's: 2: passive RODRIGEZ now 75%. (12/22/2021), MCP flexion at index = 90 deg (1/22/22) , Thumb IP = full extension, 50% flexion, MCP = full extension, 50% flexion. CMC: 25% flexion. CMC radial abduction 40 deg. Wrist flexion P/AROM = 55 deg (1/5/2022), extension A/PROM = 40 deg/48 deg  Deg (1/5/22), Ulnar deviation = 15 deg PROM, radial deviation = to 15 deg. R forearm active/passive. pronation/supination  = ~ 60 deg to 75 deg (12/29/2021). Bihxdskxdu-jk-inpsvndy palmar crease measures: R hand: index and ring 4.5 cm (1/31/22)  Strength:   Left:  digits all 4-/5, Wrist flex/ext all 4/5  Right: digits all 3-/5, wrist flex/ext 4-/5   (1/19/22): 3rd run dynamometer: left = 32 #, right = 15#. 1/28/22: right = 15#    2/7/22: Fingernail to palm finger flexion measures R hand: Index = 3.5cm, Middle: 3.5 cm, Ring: 3.4 cm, 5th finger: 3.4 cm. Able to flex each MCP at digits 2-5 to 90 deg, and at each PIP to 90 deg, each w/stiff end feel. Able to lateral pinch with index to middle phalanx and tip, able to pinch to tip of middle digit with effort.  strength at 3rd rung = 17#. Quick-DASH score: 29/55  2/9/22: Pre treatment:   R hand ROM's: MCP flexion# 2 = 40 deg, #3 = 40 deg, # 4 = 40 deg, #5 = 30 deg. PIP flexion: #2 = 40 deg, # 3 = 50 deg, # 4 = 52 deg, # 5 = 50 deg,   DIP fleixon: #2 = 40 deg, # 3 = 30 deg, # 4 = 30 deg, # 5 = 28 deg. Fingernail to proximal palmar crease distances: #2 = 6cm, # 3 = 6 cm, # 4 = 5.5 cm, # 5 = 4.3 cm. Post treatment:   MCP flexion : #2 = 60 deg, # 3 = 55 deg  PIP fleixon: #2 = 88 deg, # 3 = 70 deg. TREATMENT:   THERAPEUTIC ACTIVITY: ( see below for minutes): Therapeutic activities per grid below to improve mobility, strength, balance and coordination. Required minimal visual, verbal, manual and tactile cues to improve independence and safety with daily activities . THERAPEUTIC EXERCISE: (see below for minutes):  Exercises per grid below to improve mobility, strength, balance and coordination. Required minimal verbal and manual cues to promote proper body alignment, promote proper body posture and promote proper body mechanics. Progressed resistance, range, repetitions and complexity of movement as indicated. MANUAL THERAPY: (see below for minutes): Joint mobilization and Soft tissue mobilization was utilized and necessary because of the patient's restricted joint motion, painful spasm, loss of articular motion and restricted motion of soft tissue. MODALITIES: (see below for minutes):      to decrease pain    SELF CARE: (see below for minutes): Procedure(s) (per grid) utilized to improve and/or restore self-care/home management as related to dressing, bathing and grooming. Required minimal verbal cueing to facilitate activities of daily living skills and compensatory activities.      Date: 2/7/22 (visit 32/PN) 2/9/22 (visit 33) 2/11/22 (visit 34)      Modalities:                                    Therapeutic Exercise: 40 min 43 min 43 min       Static stretch- full fist with 5 minute hold at digits 2-5 Static stretch: hook fist x 5 min, MCP flexion only x 3 min Finger extension static stretching, extension - individual x 1 min each 1-5, combined 2-5        Static stretch Digit 2-5 flexion at MCP's: 5 min Flexion stretch combined thumb (IP, MCP/CMC flexion ) x 2 min and IP MCP x 2 min Flexion stretch hook fist static hold x 4 min, MP flexion static hold x 3 min       Flexion stretch combined thumb (IP, MCP/CMC flexion ) x 2 min Manual resist individual digits 2 - 5 flexion and combined flexion 2-3, 2-3-4 x 15 reps, with end range flexion holds x 5 \" Yellow ball gripping x 30 , f/b 10 x 5\" holds       Manual resist individual digits 2 5- flexion x 15 reps, with end range flexion holds x 5 \" Theraputty red hook fist gripping x 20 Manual resist 2nd-3rd and all finger flexion curls x 15 rep each       Finger extension stretching digits 2-3 (MCP/IP's_ L thumb flexion presses and curls (15 each) Theraputty gray hook fists x 20, thumb pressses x 5       Red theraball gripping  W/ 2\" holds x 4 min. Therabar green: pronation bilat 3 x 10 , supination 3 x 10, wrist flex/ext x 20 Finger dexterity: rapid opposition thumb to index/middle/ring finger x 15       Manl resist thumb flexion's x 15  Biceps curls w/ 3#: partial range and full range x 30 each       [place] pickups w/ index/thumb x 15  Pronation/supination w/ 3# dumbbell x 20       Alternating finger abd/add's w/ manl resist 15 each digit. Lateral pinch w/ thumb to index/ring x 20       Lateral pinch w/ spring clip: 15 w/ index, 15 w/ index/middle. Proprioceptive Activities:                                    Manual Therapy:                                             Therapeutic Activities:                                      HEP: Exercises  Exercises  Seated Wrist Extension PROM - 2 x daily - 7 x weekly - 5 reps - 15 second hold  Seated Wrist Prayer Stretch - 2 x daily - 7 x weekly - 5 sets - 15 second hold  Seated Wrist Flexion with Overpressure - 2 x daily - 7 x weekly - 10 reps - 15 second hold  Seated Finger Composite Flexion Stretch - 2 x daily - 7 x weekly - 3 reps - 2 minuessccond hold  Wrist Pronation Stretch - 2 x daily - 7 x weekly - 5 sets - 15 second hold  Seated Wrist Supination Stretch - 2 x daily - 7 x weekly - 5 sets - 15 second hold    Added 12/10/2021: Emphasize 5 to 10 min holds on steady finger flexion stretch (MCP/IP to palm). OK to work on thumb-finger pinch using clothespin squeeze.    1/5/22: hand compression garments issued (incrediwear), wear as much as tolerated, including at night. 2/7/22: start static hold full fist stretches x 5 min, 3x/day, and MCP flexion stretches w/ holds x 5 min. [employer]  Treatment/Session Summary:    · Response to Treatment: More quickly able to stretch fingers into partial hook fist position now. Finally able to pinch to middle and ring finger, but with effort and limited thumb opposition ROM. · Communication/Consultation:  None today  · Equipment provided today:  None today   · Recommendations/Intent for next treatment session: Emphasis on a/p/rrom to regain flexor tendon/finger flexion RODRIGEZ and thumb opposition for normal /grasp, pronation/supination. Manual therapy for joint and tendon mobility.      Total Treatment Billable Duration:  43 minutes  PT Patient Time In/Time Out  Time In: 1100  Time Out: 250 [facility], PT    Future Appointments   Date Time Provider Kamille Reis   2/14/2022 11:00 AM [facility], PT [facility]   2/16/2022 11:00 AM [facility]   2/18/2022 11:00 AM [facility], PT [facility]   2/21/2022 11:00 AM [facility], PT [facility]   2/23/2022 11:00 AM [facility], PT [facility]   2/25/2022 11:00 AM [facility], PT [facility]   2/28/2022 11:00 AM [facility] Saima Magana, PT [facility]

## 2022-02-14 ENCOUNTER — HOSPITAL ENCOUNTER (OUTPATIENT)
Dept: PHYSICAL THERAPY | Age: 82
Discharge: HOME | End: 2022-02-14
Payer: COMMERCIAL

## 2022-02-14 PROCEDURE — 97110 THERAPEUTIC EXERCISES: CPT

## 2022-02-14 NOTE — PROGRESS NOTES
Ekta Black  : 1940  Primary: [facility] [employer]*  Secondary:  Ashley Cedeño @ [address] Attila YUKO Farias.  [phone]   [phone]      OUTPATIENT PHYSICAL THERAPY: Daily Treatment Note  2022   ICD-10: Treatment Diagnosis: Pain in right wrist (M25.531), Stiffness of right wrist, not elsewhere classified (M25.631), Pain in left wrist (M25.532), Stiffness of left wrist, not elsewhere classified (M25.632), Stiffness of right hand, not elsewhere classified (M25.641) and Stiffness of left hand, not elsewhere classified (M25.642)  MEDICAL/REFERRING DIAGNOSIS:  Status post bilateral distal radius fractures   TREATMENT PLAN:  Effective Dates: 2022TO 3/2//2022 (60 days). Frequency/Duration: 2 times a week for 60 Days  GOALS: (Goals have been discussed and agreed upon with patient.)  Short-Term Functional Goals: Time Frame: 4 weeks:   1. Independent performance of home exercise program (MET)  2. Reduce R hand edema to normal levels to allow normal finger to palm fist motions (Progressing)  3. Increase R wrist/hand ROM's to 75% or better of normal  In all planes of motion (MET)  Discharge Goals: Time Frame: 12 weeks  1. Pt to demonstrate at least 35#   Strength L/R for normal weight carrying (progressing)  2. Pt to demonstrate L/R wrist/ hand ROM's 85% or better of normal all planes (progressing)  3. Improve Quick-DASH scores to 15/55 or better to reflect return to normal ADL function (progressing)  Rehabilitation Potential For Stated Goals: Good  _______________________________________________________________________  Pre-treatment Symptoms/Complaints: Hands continue to improve but thumb continues to be difficult. Drove to therapy today for first time since injury, states that turning her steering wheel L with her R hand was the most difficult thing to accomplish.    Pain: Initial: 10 Post Session:  No increase/10 Medications Last Reviewed:  2/14/2022  Updated Objective Findings:    Observation/Orthostatic Postural Assessment:    Pt is wearing right hand compressive glove. Palpation:    Diffuse right digital swelling, mild dorsal R hand swelling (12/29/2021)  ROM:    Left : Pt demonstrates ability to perform full left hook, open and full fists. AROM's : wrist flexion = 40 deg, extension = 45 deg, pronation/supination 75% of full AROM, Ulnar deviation = 18 deg, radial deviation = 20 deg. Right: Passive Charleston Gomez digit ROM's: 2: passive RODRIGEZ now 75%. (12/22/2021), MCP flexion at index = 90 deg (1/22/22) , Thumb IP = full extension, 50% flexion, MCP = full extension, 50% flexion. CMC: 25% flexion. CMC radial abduction 40 deg. Wrist flexion P/AROM = 55 deg (1/5/2022), extension A/PROM = 40 deg/48 deg  Deg (1/5/22), Ulnar deviation = 15 deg PROM, radial deviation = to 15 deg. R forearm active/passive. pronation/supination  = ~ 60 deg to 75 deg (12/29/2021). Jeredvunfz-de-tdbucqwz palmar crease measures: R hand: index and ring 4.5 cm (1/31/22)  Strength:   Left:  digits all 4-/5, Wrist flex/ext all 4/5  Right: digits all 3-/5, wrist flex/ext 4-/5   (1/19/22): 3rd run dynamometer: left = 32 #, right = 15#. 1/28/22: right = 15#    2/7/22: Fingernail to palm finger flexion measures R hand: Index = 3.5cm, Middle: 3.5 cm, Ring: 3.4 cm, 5th finger: 3.4 cm. Able to flex each MCP at digits 2-5 to 90 deg, and at each PIP to 90 deg, each w/stiff end feel. Able to lateral pinch with index to middle phalanx and tip, able to pinch to tip of middle digit with effort.  strength at 3rd rung = 17#. Quick-DASH score: 29/55  2/9/22: Pre treatment:   R hand ROM's: MCP flexion# 2 = 40 deg, #3 = 40 deg, # 4 = 40 deg, #5 = 30 deg. PIP flexion: #2 = 40 deg, # 3 = 50 deg, # 4 = 52 deg, # 5 = 50 deg,   DIP fleixon: #2 = 40 deg, # 3 = 30 deg, # 4 = 30 deg, # 5 = 28 deg.    Fingernail to proximal palmar crease distances: #2 = 6cm, # 3 = 6 cm, # 4 = 5.5 cm, # 5 = 4.3 cm. Post treatment:   MCP flexion : #2 = 60 deg, # 3 = 55 deg  PIP fleixon: #2 = 88 deg, # 3 = 70 deg. TREATMENT:   THERAPEUTIC ACTIVITY: ( see below for minutes): Therapeutic activities per grid below to improve mobility, strength, balance and coordination. Required minimal visual, verbal, manual and tactile cues to improve independence and safety with daily activities . THERAPEUTIC EXERCISE: (see below for minutes):  Exercises per grid below to improve mobility, strength, balance and coordination. Required minimal verbal and manual cues to promote proper body alignment, promote proper body posture and promote proper body mechanics. Progressed resistance, range, repetitions and complexity of movement as indicated. MANUAL THERAPY: (see below for minutes): Joint mobilization and Soft tissue mobilization was utilized and necessary because of the patient's restricted joint motion, painful spasm, loss of articular motion and restricted motion of soft tissue. MODALITIES: (see below for minutes):      to decrease pain    SELF CARE: (see below for minutes): Procedure(s) (per grid) utilized to improve and/or restore self-care/home management as related to dressing, bathing and grooming. Required minimal verbal cueing to facilitate activities of daily living skills and compensatory activities.      Date: 2/7/22 (visit 32/PN) 2/9/22 (visit 33) 2/11/22 (visit 34) 2/14/22 (visit 35)     Modalities:                                    Therapeutic Exercise: 40 min 43 min 43 min 39 min      Static stretch- full fist with 5 minute hold at digits 2-5 Static stretch: hook fist x 5 min, MCP flexion only x 3 min Finger extension static stretching, extension - individual x 1 min each 1-5, combined 2-5  Finger extension static stretching at MCP/PIP/DIP's digits 2-5 x 3 min      Static stretch Digit 2-5 flexion at MCP's: 5 min Flexion stretch combined thumb (IP, MCP/CMC flexion ) x 2 min and IP MCP x 2 min Flexion stretch hook fist static hold x 4 min, MP flexion static hold x 3 min Flexion stretch digits 2-5: MCP flexion hold x 3 min, hook fist x 2 min, full fist x 3 min      Flexion stretch combined thumb (IP, MCP/CMC flexion ) x 2 min Manual resist individual digits 2 - 5 flexion and combined flexion 2-3, 2-3-4 x 15 reps, with end range flexion holds x 5 \" Yellow ball gripping x 30 , f/b 10 x 5\" holds Manual resist full fist flexion x 15      Manual resist individual digits 2 5- flexion x 15 reps, with end range flexion holds x 5 \" Theraputty red hook fist gripping x 20 Manual resist 2nd-3rd and all finger flexion curls x 15 rep each Theraputty flexion 2 x 15 hook fist, 1 s 15 thum flexion      Finger extension stretching digits 2-3 (MCP/IP's_ L thumb flexion presses and curls (15 each) Theraputty gray hook fists x 20, thumb pressses x 5 Finger pinch squeezes (index, middle, ring) x 10 x 2\"       Red theraball gripping  W/ 2\" holds x 4 min. Therabar green: pronation bilat 3 x 10 , supination 3 x 10, wrist flex/ext x 20 Finger dexterity: rapid opposition thumb to index/middle/ring finger x 15 Finger abduction 2-3 , 4-5 and 2-5 - doubled rubber band resist x 15 each      Manl resist thumb flexion's x 15  Biceps curls w/ 3#: partial range and full range x 30 each Wrist curls /reverse curls R w 2# x 15 each      [place] pickups w/ index/thumb x 15  Pronation/supination w/ 3# dumbbell x 20 Pronation / supination w/ 2# (held at end) x 20      Alternating finger abd/add's w/ manl resist 15 each digit. Lateral pinch w/ thumb to index/ring x 20       Lateral pinch w/ spring clip: 15 w/ index, 15 w/ index/middle.                            Proprioceptive Activities:                                    Manual Therapy:    5 min         Gr. 3 long axis traction: digits 2-3-4-5 at MCP's and PIP's : 5 min total                                Therapeutic Activities:                                      HEP: Exercises  Exercises  Seated Wrist Extension PROM - 2 x daily - 7 x weekly - 5 reps - 15 second hold  Seated Wrist Prayer Stretch - 2 x daily - 7 x weekly - 5 sets - 15 second hold  Seated Wrist Flexion with Overpressure - 2 x daily - 7 x weekly - 10 reps - 15 second hold  Seated Finger Composite Flexion Stretch - 2 x daily - 7 x weekly - 3 reps - 2 minuessccond hold  Wrist Pronation Stretch - 2 x daily - 7 x weekly - 5 sets - 15 second hold  Seated Wrist Supination Stretch - 2 x daily - 7 x weekly - 5 sets - 15 second hold    Added 12/10/2021: Emphasize 5 to 10 min holds on steady finger flexion stretch (MCP/IP to palm). OK to work on thumb-finger pinch using clothespin squeeze. 1/5/22: hand compression garments issued (incrediwear), wear as much as tolerated, including at night. 2/7/22: start static hold full fist stretches x 5 min, 3x/day, and MCP flexion stretches w/ holds x 5 min. [employer] Portal  Treatment/Session Summary:    · Response to Treatment: Finger flexor  strength enough to  whidde /steering wheel now. · Communication/Consultation:  None today  · Equipment provided today:  None today   · Recommendations/Intent for next treatment session: Emphasis on a/p/rrom to regain flexor tendon/finger flexion RODRIGEZ and thumb opposition for normal /grasp, pronation/supination. Manual therapy for joint and tendon mobility.      Total Treatment Billable Duration:  44 minutes  PT Patient Time In/Time Out  Time In: 1100  Time Out: 250 [facility], PT    Future Appointments   Date Time Provider Kamille Reis   2/16/2022 11:00 AM Terri Jack PT [facility]   2/18/2022 11:00 AM Terri Jack PT [facility]   2/21/2022 11:00 AM Terri Jack PT [facility]   2/23/2022 11:00 AM Terri Jack, PT [facility]   2/25/2022 11:00 AM Terri Jack PT [facility]   2/28/2022 11:00 AM Hiwot Velasquez, PT [facility]

## 2022-02-16 ENCOUNTER — HOSPITAL ENCOUNTER (OUTPATIENT)
Dept: PHYSICAL THERAPY | Age: 82
Discharge: HOME | End: 2022-02-16
Payer: COMMERCIAL

## 2022-02-16 PROCEDURE — 97110 THERAPEUTIC EXERCISES: CPT

## 2022-02-16 NOTE — PROGRESS NOTES
Alize Black  : 1940  Primary: [facility] [employer]  Secondary:  [facility] @ [address]  [address] [place] [address]  [phone]   [phone]      OUTPATIENT PHYSICAL THERAPY: Daily Treatment Note  2022   ICD-10: Treatment Diagnosis: Pain in right wrist (M25.531), Stiffness of right wrist, not elsewhere classified (M25.631), Pain in left wrist (M25.532), Stiffness of left wrist, not elsewhere classified (M25.632), Stiffness of right hand, not elsewhere classified (M25.641) and Stiffness of left hand, not elsewhere classified (M25.642)  MEDICAL/REFERRING DIAGNOSIS:  Status post bilateral distal radius fractures   TREATMENT PLAN:  Effective Dates: 2022TO 3/2//2022 (60 days). Frequency/Duration: 2 times a week for 60 Days  GOALS: (Goals have been discussed and agreed upon with patient.)  Short-Term Functional Goals: Time Frame: 4 weeks:   1. Independent performance of home exercise program (MET)  2. Reduce R hand edema to normal levels to allow normal finger to palm fist motions (Progressing)  3. Increase R wrist/hand ROM's to 75% or better of normal  In all planes of motion (MET)  Discharge Goals: Time Frame: 12 weeks  1. Pt to demonstrate at least 35#   Strength L/R for normal weight carrying (progressing)  2. Pt to demonstrate L/R wrist/ hand ROM's 85% or better of normal all planes (progressing)  3. Improve Quick-DASH scores to 15/55 or better to reflect return to normal ADL function (progressing)  Rehabilitation Potential For Stated Goals: Good  _______________________________________________________________________  Pre-treatment Symptoms/Complaints: Thumb pain cna be quite strong (7/10) but brief - Only certain gripping motions reproduce this. Doesn't notice much change in finger motion since last visit.    Pain: Initial: 4/10 Post Session:  No increase/10   Medications Last Reviewed:  2022  Updated Objective Findings:    Observation/Orthostatic Postural Assessment:    Pt is wearing right hand compressive glove. Palpation:    Diffuse right digital swelling, mild dorsal R hand swelling (12/29/2021)  ROM:    Left : Pt demonstrates ability to perform full left hook, open and full fists. AROM's : wrist flexion = 40 deg, extension = 45 deg, pronation/supination 75% of full AROM, Ulnar deviation = 18 deg, radial deviation = 20 deg. Right: Passive Marilyn Maryanne digit ROM's: 2: passive RODRIGEZ now 75%. (12/22/2021), MCP flexion at index = 90 deg (1/22/22) , Thumb IP = full extension, 50% flexion, MCP = full extension, 50% flexion. CMC: 25% flexion. CMC radial abduction 40 deg. Wrist flexion P/AROM = 55 deg (1/5/2022), extension A/PROM = 40 deg/48 deg  Deg (1/5/22), Ulnar deviation = 15 deg PROM, radial deviation = to 15 deg. R forearm active/passive. pronation/supination  = ~ 60 deg to 75 deg (12/29/2021). Lanahcytfb-uf-lctzizgo palmar crease measures: R hand: index and ring 4.5 cm (1/31/22)  Strength:   Left:  digits all 4-/5, Wrist flex/ext all 4/5  Right: digits all 3-/5, wrist flex/ext 4-/5   (1/19/22): 3rd run dynamometer: left = 32 #, right = 15#. 1/28/22: right = 15#    2/7/22: Fingernail to palm finger flexion measures R hand: Index = 3.5cm, Middle: 3.5 cm, Ring: 3.4 cm, 5th finger: 3.4 cm. Able to flex each MCP at digits 2-5 to 90 deg, and at each PIP to 90 deg, each w/stiff end feel. Able to lateral pinch with index to middle phalanx and tip, able to pinch to tip of middle digit with effort.  strength at 3rd rung = 17#. Quick-DASH score: 29/55  2/9/22: Pre treatment:   R hand ROM's: MCP flexion# 2 = 40 deg, #3 = 40 deg, # 4 = 40 deg, #5 = 30 deg. PIP flexion: #2 = 40 deg, # 3 = 50 deg, # 4 = 52 deg, # 5 = 50 deg,   DIP fleixon: #2 = 40 deg, # 3 = 30 deg, # 4 = 30 deg, # 5 = 28 deg. Fingernail to proximal palmar crease distances: #2 = 6cm, # 3 = 6 cm, # 4 = 5.5 cm, # 5 = 4.3 cm.    Post treatment:   MCP flexion : #2 = 60 deg, # 3 = 55 deg  PIP fleixon: #2 = 88 deg, # 3 = 70 deg. 2/16/22:  Pre treatment:   R hand ROM's: MCP flexion# 2 = 54 deg, #3 = 44 deg, # 4 = 32 deg, #5 = 26 deg. PIP flexion: #2 = 40 deg, # 3 = 50 deg, # 4 = 55 deg, # 5 = 50 deg,   DIP fleixon: #2 = 32 deg, # 3 = 32 deg, # 4 = 42 deg, # 5 = 44 deg. Post treatment fingertipe to prox. Palmar crease distance: index = 4.6cm, middle = 4.6 cm. : #2: left = 16#, right = 26#, #4: left = 18#, right = 32#. TREATMENT:   THERAPEUTIC ACTIVITY: ( see below for minutes): Therapeutic activities per grid below to improve mobility, strength, balance and coordination. Required minimal visual, verbal, manual and tactile cues to improve independence and safety with daily activities . THERAPEUTIC EXERCISE: (see below for minutes):  Exercises per grid below to improve mobility, strength, balance and coordination. Required minimal verbal and manual cues to promote proper body alignment, promote proper body posture and promote proper body mechanics. Progressed resistance, range, repetitions and complexity of movement as indicated. MANUAL THERAPY: (see below for minutes): Joint mobilization and Soft tissue mobilization was utilized and necessary because of the patient's restricted joint motion, painful spasm, loss of articular motion and restricted motion of soft tissue. MODALITIES: (see below for minutes):      to decrease pain    SELF CARE: (see below for minutes): Procedure(s) (per grid) utilized to improve and/or restore self-care/home management as related to dressing, bathing and grooming. Required minimal verbal cueing to facilitate activities of daily living skills and compensatory activities.      Date: 2/7/22 (visit 32/PN) 2/9/22 (visit 33) 2/11/22 (visit 34) 2/14/22 (visit 35) 2/16/22 (visit 36)    Modalities:                                    Therapeutic Exercise: 40 min 43 min 43 min 39 min 44 min     Static stretch- full fist with 5 minute hold at digits 2-5 Static stretch: hook fist x 5 min, MCP flexion only x 3 min Finger extension static stretching, extension - individual x 1 min each 1-5, combined 2-5  Finger extension static stretching at MCP/PIP/DIP's digits 2-5 x 3 min Finger flexion stretchin-3-4-5: MCP isolated and combined IP flexion static holds for 2-3 min each, finger abd x 1 min, thumb IP/MCP combined stretch x 3 min, abduction x 1 min     Static stretch Digit 2-5 flexion at MCP's: 5 min Flexion stretch combined thumb (IP, MCP/CMC flexion ) x 2 min and IP MCP x 2 min Flexion stretch hook fist static hold x 4 min, MP flexion static hold x 3 min Flexion stretch digits 2-5: MCP flexion hold x 3 min, hook fist x 2 min, full fist x 3 min Finger extension stretching /tractioning x 3 min     Flexion stretch combined thumb (IP, MCP/CMC flexion ) x 2 min Manual resist individual digits 2 - 5 flexion and combined flexion 2-3, 2-3-4 x 15 reps, with end range flexion holds x 5 \" Yellow ball gripping x 30 , f/b 10 x 5\" holds Manual resist full fist flexion x 15 Yellow ball gripping x 2 min     Manual resist individual digits 2 5- flexion x 15 reps, with end range flexion holds x 5 \" Theraputty red hook fist gripping x 20 Manual resist 2nd-3rd and all finger flexion curls x 15 rep each Theraputty flexion 2 x 15 hook fist, 1 s 15 thum flexion Manl . Resist 2-3 and 2-3-4-5 curls in 15 rep sets  X 2 . Finger extension stretching digits 2-3 (MCP/IP's_ L thumb flexion presses and curls (15 each) Theraputty gray hook fists x 20, thumb pressses x 5 Finger pinch squeezes (index, middle, ring) x 10 x 2\"  Finger dexterity: alternating index/middle / ring to thumb touches at pads x 2 min     Red theraball gripping  W/ 2\" holds x 4 min.   Therabar green: pronation bilat 3 x 10 , supination 3 x 10, wrist flex/ext x 20 Finger dexterity: rapid opposition thumb to index/middle/ring finger x 15 Finger abduction 2-3 , 4-5 and 2-5 - doubled rubber band resist x 15 each Theraputty brown pinch /pulls at index and index/middle fingers: 10 rep sets each. Manl resist thumb flexion's x 15  Biceps curls w/ 3#: partial range and full range x 30 each Wrist curls /reverse curls R w 2# x 15 each End range wrist flex and extn stretching      [place] pickups w/ index/thumb x 15  Pronation/supination w/ 3# dumbbell x 20 Pronation / supination w/ 2# (held at end) x 20      Alternating finger abd/add's w/ manl resist 15 each digit. Lateral pinch w/ thumb to index/ring x 20       Lateral pinch w/ spring clip: 15 w/ index, 15 w/ index/middle. Proprioceptive Activities:                                    Manual Therapy:    5 min         Gr. 3 long axis traction: digits 2-3-4-5 at MCP's and PIP's : 5 min total                                Therapeutic Activities:                                      HEP: Exercises  Exercises  Seated Wrist Extension PROM - 2 x daily - 7 x weekly - 5 reps - 15 second hold  Seated Wrist Prayer Stretch - 2 x daily - 7 x weekly - 5 sets - 15 second hold  Seated Wrist Flexion with Overpressure - 2 x daily - 7 x weekly - 10 reps - 15 second hold  Seated Finger Composite Flexion Stretch - 2 x daily - 7 x weekly - 3 reps - 2 minuessccond hold  Wrist Pronation Stretch - 2 x daily - 7 x weekly - 5 sets - 15 second hold  Seated Wrist Supination Stretch - 2 x daily - 7 x weekly - 5 sets - 15 second hold    Added 12/10/2021: Emphasize 5 to 10 min holds on steady finger flexion stretch (MCP/IP to palm). OK to work on thumb-finger pinch using clothespin squeeze. 1/5/22: hand compression garments issued ([employer]), wear as much as tolerated, including at night. 2/7/22: start static hold full fist stretches x 5 min, 3x/day, and MCP flexion stretches w/ holds x 5 min.      [employer] Portal  Treatment/Session Summary:    · Response to Treatment: Mixed ROM changes in fingers  With better MCP flexion at fingers 2/3, at 4th PIP and 4th/5th DIP's. Mild increase in . · Communication/Consultation:  None today  · Equipment provided today:  None today   · Recommendations/Intent for next treatment session: Emphasis on a/p/rrom to regain flexor tendon/finger flexion RODRIGEZ and thumb opposition for normal /grasp, pronation/supination. Manual therapy for joint and tendon mobility.      Total Treatment Billable Duration:  44 minutes  PT Patient Time In/Time Out  Time In: 1100  Time Out: 250 [facility], PT    Future Appointments   Date Time Provider Kamille Ries   2/18/2022 11:00 AM Yanna Stanley, PT [facility]   2/21/2022 11:00 AM Yanna Stanley, PT [facility]   2/23/2022 11:00 AM Yanna Stanley, PT [facility]   2/25/2022 11:00 AM Yanna Stanley, PT [facility]   2/28/2022 11:00 AM Devin Beltran, PT [facility]

## 2022-02-18 ENCOUNTER — HOSPITAL ENCOUNTER (OUTPATIENT)
Dept: PHYSICAL THERAPY | Age: 82
Discharge: HOME | End: 2022-02-18
Payer: COMMERCIAL

## 2022-02-18 PROCEDURE — 97110 THERAPEUTIC EXERCISES: CPT

## 2022-02-18 NOTE — PROGRESS NOTES
Maria Ines Black  : 1940  Primary: [facility]*  Secondary:  [address] @ [address] Attila YUKO Farias.  [phone]   [phone]      OUTPATIENT PHYSICAL THERAPY: Daily Treatment Note  2022   ICD-10: Treatment Diagnosis: Pain in right wrist (M25.531), Stiffness of right wrist, not elsewhere classified (M25.631), Pain in left wrist (M25.532), Stiffness of left wrist, not elsewhere classified (M25.632), Stiffness of right hand, not elsewhere classified (M25.641) and Stiffness of left hand, not elsewhere classified (M25.642)  MEDICAL/REFERRING DIAGNOSIS:  Status post bilateral distal radius fractures   TREATMENT PLAN:  Effective Dates: 2022TO 3/2//2022 (60 days). Frequency/Duration: 2 times a week for 60 Days  GOALS: (Goals have been discussed and agreed upon with patient.)  Short-Term Functional Goals: Time Frame: 4 weeks:   1. Independent performance of home exercise program (MET)  2. Reduce R hand edema to normal levels to allow normal finger to palm fist motions (Progressing)  3. Increase R wrist/hand ROM's to 75% or better of normal  In all planes of motion (MET)  Discharge Goals: Time Frame: 12 weeks  1. Pt to demonstrate at least 35#   Strength L/R for normal weight carrying (progressing)  2. Pt to demonstrate L/R wrist/ hand ROM's 85% or better of normal all planes (progressing)  3. Improve Quick-DASH scores to 15/55 or better to reflect return to normal ADL function (progressing)  Rehabilitation Potential For Stated Goals: Good  _______________________________________________________________________  Pre-treatment Symptoms/Complaints: Hand stiffness by the time she comes back to therapy appointments, ROM is better on days after therapy appointments. Thumb pain varies day to day.    Pain: Initial: 4/10 Post Session:  No increase/10   Medications Last Reviewed:  2022  Updated Objective Findings: Observation/Orthostatic Postural Assessment:    Pt is wearing right hand compressive glove. Palpation:    Diffuse right digital swelling, mild dorsal R hand swelling (12/29/2021)  ROM:    Left : Pt demonstrates ability to perform full left hook, open and full fists. AROM's : wrist flexion = 40 deg, extension = 45 deg, pronation/supination 75% of full AROM, Ulnar deviation = 18 deg, radial deviation = 20 deg. Right: Passive Darío Cornet digit ROM's: 2: passive RODRIGEZ now 75%. (12/22/2021), MCP flexion at index = 90 deg (1/22/22) , Thumb IP = full extension, 50% flexion, MCP = full extension, 50% flexion. CMC: 25% flexion. CMC radial abduction 40 deg. Wrist flexion P/AROM = 55 deg (1/5/2022), extension A/PROM = 40 deg/48 deg  Deg (1/5/22), Ulnar deviation = 15 deg PROM, radial deviation = to 15 deg. R forearm active/passive. pronation/supination  = ~ 60 deg to 75 deg (12/29/2021). Icceqcxapt-[id number]-cuyiidof palmar crease measures: R hand: index and ring 4.5 cm (1/31/22)  Strength:   Left:  digits all 4-/5, Wrist flex/ext all 4/5  Right: digits all 3-/5, wrist flex/ext 4-/5   (1/19/22): 3rd run dynamometer: left = 32 #, right = 15#. 1/28/22: right = 15#    2/7/22: Fingernail to palm finger flexion measures R hand: Index = 3.5cm, Middle: 3.5 cm, Ring: 3.4 cm, 5th finger: 3.4 cm. Able to flex each MCP at digits 2-5 to 90 deg, and at each PIP to 90 deg, each w/stiff end feel. Able to lateral pinch with index to middle phalanx and tip, able to pinch to tip of middle digit with effort.  strength at 3rd rung = 17#. Quick-DASH score: 29/55  2/9/22: Pre treatment:   R hand ROM's: MCP flexion# 2 = 40 deg, #3 = 40 deg, # 4 = 40 deg, #5 = 30 deg. PIP flexion: #2 = 40 deg, # 3 = 50 deg, # 4 = 52 deg, # 5 = 50 deg,   DIP fleixon: #2 = 40 deg, # 3 = 30 deg, # 4 = 30 deg, # 5 = 28 deg. Fingernail to proximal palmar crease distances: #2 = 6cm, # 3 = 6 cm, # 4 = 5.5 cm, # 5 = 4.3 cm.    Post treatment:   MCP flexion : #2 = 60 deg, # 3 = 55 deg  PIP fleixon: #2 = 88 deg, # 3 = 70 deg. 2/16/22:  Pre treatment:   R hand ROM's: MCP flexion# 2 = 54 deg, #3 = 44 deg, # 4 = 32 deg, #5 = 26 deg. PIP flexion: #2 = 40 deg, # 3 = 50 deg, # 4 = 55 deg, # 5 = 50 deg,   DIP fleixon: #2 = 32 deg, # 3 = 32 deg, # 4 = 42 deg, # 5 = 44 deg. Post treatment fingertipe to prox. Palmar crease distance: index = 4.6cm, middle = 4.6 cm. : #2: left = 16#, right = 26#, #4: left = 18#, right = 32#. TREATMENT:   THERAPEUTIC ACTIVITY: ( see below for minutes): Therapeutic activities per grid below to improve mobility, strength, balance and coordination. Required minimal visual, verbal, manual and tactile cues to improve independence and safety with daily activities . THERAPEUTIC EXERCISE: (see below for minutes):  Exercises per grid below to improve mobility, strength, balance and coordination. Required minimal verbal and manual cues to promote proper body alignment, promote proper body posture and promote proper body mechanics. Progressed resistance, range, repetitions and complexity of movement as indicated. MANUAL THERAPY: (see below for minutes): Joint mobilization and Soft tissue mobilization was utilized and necessary because of the patient's restricted joint motion, painful spasm, loss of articular motion and restricted motion of soft tissue. MODALITIES: (see below for minutes):      to decrease pain    SELF CARE: (see below for minutes): Procedure(s) (per grid) utilized to improve and/or restore self-care/home management as related to dressing, bathing and grooming. Required minimal verbal cueing to facilitate activities of daily living skills and compensatory activities.      Date: 2/7/22 (visit 32/PN) 2/9/22 (visit 33) 2/11/22 (visit 34) 2/14/22 (visit 35) 2/16/22 (visit 36) 2/18/22 (visit 37)   Modalities:                                    Therapeutic Exercise: 40 min 43 min 43 min 39 min 44 min 39 min    Static stretch- full fist with 5 minute hold at digits 2-5 Static stretch: hook fist x 5 min, MCP flexion only x 3 min Finger extension static stretching, extension - individual x 1 min each 1-5, combined 2-5  Finger extension static stretching at MCP/PIP/DIP's digits 2-5 x 3 min Finger flexion stretchin-3-4-5: MCP isolated and combined IP flexion static holds for 2-3 min each, finger abd x 1 min, thumb IP/MCP combined stretch x 3 min, abduction x 1 min Finger flexor stretchin-3-4-5: MCP static holds, PIP static holds, combined full fist and hook fists with tendon gliding. Thumb IP and MCP combined static hold x 3 min    Static stretch Digit 2-5 flexion at MCP's: 5 min Flexion stretch combined thumb (IP, MCP/CMC flexion ) x 2 min and IP MCP x 2 min Flexion stretch hook fist static hold x 4 min, MP flexion static hold x 3 min Flexion stretch digits 2-5: MCP flexion hold x 3 min, hook fist x 2 min, full fist x 3 min Finger extension stretching /tractioning x 3 min Finger extension stretching /tractioning x 3 min    Flexion stretch combined thumb (IP, MCP/CMC flexion ) x 2 min Manual resist individual digits 2 - 5 flexion and combined flexion 2-3, 2-3-4 x 15 reps, with end range flexion holds x 5 \" Yellow ball gripping x 30 , f/b 10 x 5\" holds Manual resist full fist flexion x 15 Yellow ball gripping x 2 min Hand gripper (light) squeezes for hook fist x 30     Manual resist individual digits 2 5- flexion x 15 reps, with end range flexion holds x 5 \" Theraputty red hook fist gripping x 20 Manual resist 2nd-3rd and all finger flexion curls x 15 rep each Theraputty flexion 2 x 15 hook fist, 1 s 15 thum flexion Manl . Resist 2-3 and 2-3-4-5 curls in 15 rep sets  X 2 . Manl. Resist: 2, 2-3, 2-3-4-5 full fist curls: 2 x 15 each.      Finger extension stretching digits 2-3 (MCP/IP's_ L thumb flexion presses and curls (15 each) Theraputty gray hook fists x 20, thumb pressses x 5 Finger pinch squeezes (index, middle, ring) x 10 x 2\"  Finger dexterity: alternating index/middle / ring to thumb touches at pads x 2 min Thumb flexion curls manl resist x 15    Red theraball gripping  W/ 2\" holds x 4 min. Therabar green: pronation bilat 3 x 10 , supination 3 x 10, wrist flex/ext x 20 Finger dexterity: rapid opposition thumb to index/middle/ring finger x 15 Finger abduction 2-3 , 4-5 and 2-5 - doubled rubber band resist x 15 each Theraputty brown pinch /pulls at index and index/middle fingers: 10 rep sets each. Wrist extn. Stretch w/ carpal glides x 3 min    Manl resist thumb flexion's x 15  Biceps curls w/ 3#: partial range and full range x 30 each Wrist curls /reverse curls R w 2# x 15 each End range wrist flex and extn stretching  Dowel gripping: wide/medium w/ pronation /supination AROM 15 each, narrow repeated gripping w/ assist for end range 2nd-3rd flexion x 15    Waite pickups w/ index/thumb x 15  Pronation/supination w/ 3# dumbbell x 20 Pronation / supination w/ 2# (held at end) x 20      Alternating finger abd/add's w/ manl resist 15 each digit. Lateral pinch w/ thumb to index/ring x 20       Lateral pinch w/ spring clip: 15 w/ index, 15 w/ index/middle.                            Proprioceptive Activities:                                    Manual Therapy:    5 min  4 min       Gr. 3 long axis traction: digits 2-3-4-5 at MCP's and PIP's : 5 min total  Wrist distraction /palmar glides/carpal palmar glides gr. 3-4         Palmar STM x 1 min                     Therapeutic Activities:                                      HEP: Exercises  Exercises  Seated Wrist Extension PROM - 2 x daily - 7 x weekly - 5 reps - 15 second hold  Seated Wrist Prayer Stretch - 2 x daily - 7 x weekly - 5 sets - 15 second hold  Seated Wrist Flexion with Overpressure - 2 x daily - 7 x weekly - 10 reps - 15 second hold  Seated Finger Composite Flexion Stretch - 2 x daily - 7 x weekly - 3 reps - 2 minuessccond hold  Wrist Pronation Stretch - 2 x daily - 7 x weekly - 5 sets - 15 second hold  Seated Wrist Supination Stretch - 2 x daily - 7 x weekly - 5 sets - 15 second hold    Added 12/10/2021: Emphasize 5 to 10 min holds on steady finger flexion stretch (MCP/IP to palm). OK to work on thumb-finger pinch using clothespin squeeze. 1/5/22: hand compression garments issued (incrediwear), wear as much as tolerated, including at night. 2/7/22: start static hold full fist stretches x 5 min, 3x/day, and MCP flexion stretches w/ holds x 5 min. [facility] Portal  Treatment/Session Summary:    · Response to Treatment: Now able to passively flex all digits to 85-90% of full fist ROM, but active finger flexion into full fist continues to lag at ~ 75% after exercise/stretching/mobilization, less than this at start of therapy sessions. .     · Communication/Consultation:  None today  · Equipment provided today:  None today   · Recommendations/Intent for next treatment session: Emphasis on a/p/rrom to regain flexor tendon/finger flexion RODRIGEZ and thumb opposition for normal /grasp, pronation/supination. Manual therapy for joint and tendon mobility.      Total Treatment Billable Duration:  43 minutes  PT Patient Time In/Time Out  Time In: 0667  Time Out: 119 Ankur Turner, PT    Future Appointments   Date Time Provider Kamille Reis   2/21/2022 11:00 AM Sandra Hankins, PT [facility]   2/23/2022 11:00 AM Sandra Hankins, PT [facility]   2/25/2022 11:00 AM Sandra Hankins, PT [facility]   2/28/2022 11:00 AM Sandra Hankins, PT [facility]   3/2/2022 11:00 AM Sandra Hankins, PT [facility]   3/3/2022 11:00 AM Sandra Hankins, PT [facility]   3/7/2022 11:00 AM Sandra Hankins, PT [facility]   3/9/2022 11:00 AM Sandra Hankins, PT [facility]   3/11/2022 11:00 AM Sandra Hankins, PT [facility]   3/14/2022 11:00 AM Sandra Hankins, PT [facility]   3/16/2022 11:00 AM Karolyn Klein, PT [facility] 3/18/2022 11:00 AM Dottie Cage, PT [facility]   3/21/2022 11:00 AM Dottie Cage, PT [facility]   3/23/2022 11:00 AM Dottie Cage, PT [facility]   3/25/2022 11:00 AM Dottie Cage, PT [facility]   3/28/2022 11:00 AM Dottie Cage, PT [facility]   3/30/2022 11:00 AM Quinton Munguia, PT [facility]

## 2022-02-21 ENCOUNTER — HOSPITAL ENCOUNTER (OUTPATIENT)
Dept: PHYSICAL THERAPY | Age: 82
Discharge: HOME | End: 2022-02-21
Payer: COMMERCIAL

## 2022-02-21 PROCEDURE — 97110 THERAPEUTIC EXERCISES: CPT

## 2022-02-21 NOTE — PROGRESS NOTES
Donovan Black  : 1940  Primary: [facility]*  Secondary:  Francisco Segura @ [place] [address].  [phone]   [phone]      OUTPATIENT PHYSICAL THERAPY: Daily Treatment Note  2022   ICD-10: Treatment Diagnosis: Pain in right wrist (M25.531), Stiffness of right wrist, not elsewhere classified (M25.631), Pain in left wrist (M25.532), Stiffness of left wrist, not elsewhere classified (M25.632), Stiffness of right hand, not elsewhere classified (M25.641) and Stiffness of left hand, not elsewhere classified (M25.642)  MEDICAL/REFERRING DIAGNOSIS:  Status post bilateral distal radius fractures   TREATMENT PLAN:  Effective Dates: 2022TO 3/2//2022 (60 days). Frequency/Duration: 2 times a week for 60 Days  GOALS: (Goals have been discussed and agreed upon with patient.)  Short-Term Functional Goals: Time Frame: 4 weeks:   1. Independent performance of home exercise program (MET)  2. Reduce R hand edema to normal levels to allow normal finger to palm fist motions (Progressing)  3. Increase R wrist/hand ROM's to 75% or better of normal  In all planes of motion (MET)  Discharge Goals: Time Frame: 12 weeks  1. Pt to demonstrate at least 35#   Strength L/R for normal weight carrying (progressing)  2. Pt to demonstrate L/R wrist/ hand ROM's 85% or better of normal all planes (progressing)  3. Improve Quick-DASH scores to 15/55 or better to reflect return to normal ADL function (progressing)  Rehabilitation Potential For Stated Goals: Good  _______________________________________________________________________  Pre-treatment Symptoms/Complaints: Slowly increasing hand range, thumb doesn't hurt today. Pain: Initial: 0/10 Post Session:  No increase/10   Medications Last Reviewed:  2022  Updated Objective Findings:    Observation/Orthostatic Postural Assessment:    Pt is wearing right hand compressive glove. Palpation:    Diffuse right digital swelling, mild dorsal R hand swelling (12/29/2021)  ROM:    Left : Pt demonstrates ability to perform full left hook, open and full fists. AROM's : wrist flexion = 40 deg, extension = 45 deg, pronation/supination 75% of full AROM, Ulnar deviation = 18 deg, radial deviation = 20 deg. Right: Passive Harrison Andrea digit ROM's: 2: passive RODRIGEZ now 75%. (12/22/2021), MCP flexion at index = 90 deg (1/22/22) , Thumb IP = full extension, 50% flexion, MCP = full extension, 50% flexion. CMC: 25% flexion. CMC radial abduction 40 deg. Wrist flexion P/AROM = 55 deg (1/5/2022), extension A/PROM = 40 deg/48 deg  Deg (1/5/22), Ulnar deviation = 15 deg PROM, radial deviation = to 15 deg. R forearm active/passive. pronation/supination  = ~ 60 deg to 75 deg (12/29/2021). [id number] palmar crease measures: R hand: index and ring 4.5 cm (1/31/22)  Strength:   Left:  digits all 4-/5, Wrist flex/ext all 4/5  Right: digits all 3-/5, wrist flex/ext 4-/5   (1/19/22): 3rd run dynamometer: left = 32 #, right = 15#. 1/28/22: right = 15#    2/7/22: Fingernail to palm finger flexion measures R hand: Index = 3.5cm, Middle: 3.5 cm, Ring: 3.4 cm, 5th finger: 3.4 cm. Able to flex each MCP at digits 2-5 to 90 deg, and at each PIP to 90 deg, each w/stiff end feel. Able to lateral pinch with index to middle phalanx and tip, able to pinch to tip of middle digit with effort.  strength at 3rd rung = 17#. Quick-DASH score: 29/55  2/9/22: Pre treatment:   R hand ROM's: MCP flexion# 2 = 40 deg, #3 = 40 deg, # 4 = 40 deg, #5 = 30 deg. PIP flexion: #2 = 40 deg, # 3 = 50 deg, # 4 = 52 deg, # 5 = 50 deg,   DIP fleixon: #2 = 40 deg, # 3 = 30 deg, # 4 = 30 deg, # 5 = 28 deg. Fingernail to proximal palmar crease distances: #2 = 6cm, # 3 = 6 cm, # 4 = 5.5 cm, # 5 = 4.3 cm. Post treatment:   MCP flexion : #2 = 60 deg, # 3 = 55 deg  PIP fleixon: #2 = 88 deg, # 3 = 70 deg.    2/16/22:  Pre treatment:   R hand ROM's: MCP flexion# 2 = 54 deg, #3 = 44 deg, # 4 = 32 deg, #5 = 26 deg. PIP flexion: #2 = 40 deg, # 3 = 50 deg, # 4 = 55 deg, # 5 = 50 deg,   DIP fleixon: #2 = 32 deg, # 3 = 32 deg, # 4 = 42 deg, # 5 = 44 deg. Post treatment fingertipe to prox. Palmar crease distance: index = 4.6cm, middle = 4.6 cm. : #2: left = 16#, right = 26#, #4: left = 18#, right = 32#. TREATMENT:   THERAPEUTIC ACTIVITY: ( see below for minutes): Therapeutic activities per grid below to improve mobility, strength, balance and coordination. Required minimal visual, verbal, manual and tactile cues to improve independence and safety with daily activities . THERAPEUTIC EXERCISE: (see below for minutes):  Exercises per grid below to improve mobility, strength, balance and coordination. Required minimal verbal and manual cues to promote proper body alignment, promote proper body posture and promote proper body mechanics. Progressed resistance, range, repetitions and complexity of movement as indicated. MANUAL THERAPY: (see below for minutes): Joint mobilization and Soft tissue mobilization was utilized and necessary because of the patient's restricted joint motion, painful spasm, loss of articular motion and restricted motion of soft tissue. MODALITIES: (see below for minutes):      to decrease pain    SELF CARE: (see below for minutes): Procedure(s) (per grid) utilized to improve and/or restore self-care/home management as related to dressing, bathing and grooming. Required minimal verbal cueing to facilitate activities of daily living skills and compensatory activities.      Date: 2/21/22 (visit 38)        Modalities:                                    Therapeutic Exercise: 43 min         Finger flexion stretching: MCP 2-3-4-5  static hold x  2 min, PIP static holds x 4 min, combined hook fist and full fist flexion holds x 10-15\" reps x 10 min         Finger extension stretching /tractioning x 2 min 2-3-4-5          Manl resist MCP/PIP fleixon 3 x 15 2-3, 2/x 15 4-5, and full fist flexions x 15         Thumb stretching: MCP-PIP x 2 min, CMC abduction x 1 min, flexion x 1 min         Thumb flexion curls manl resist x 15         Theraputty brown pinch/pulls bilat index-thumb x 15         PIP-DIP 2-5 flexions into theraputty brown x 15         Tumb flexions into putty x 15,          Manl resist thumb abduction x 15                                   Proprioceptive Activities:                                    Manual Therapy:                                             Therapeutic Activities:                                      HEP: Exercises  Exercises  Seated Wrist Extension PROM - 2 x daily - 7 x weekly - 5 reps - 15 second hold  Seated Wrist Prayer Stretch - 2 x daily - 7 x weekly - 5 sets - 15 second hold  Seated Wrist Flexion with Overpressure - 2 x daily - 7 x weekly - 10 reps - 15 second hold  Seated Finger Composite Flexion Stretch - 2 x daily - 7 x weekly - 3 reps - 2 minuessccond hold  Wrist Pronation Stretch - 2 x daily - 7 x weekly - 5 sets - 15 second hold  Seated Wrist Supination Stretch - 2 x daily - 7 x weekly - 5 sets - 15 second hold    Added 12/10/2021: Emphasize 5 to 10 min holds on steady finger flexion stretch (MCP/IP to palm). OK to work on thumb-finger pinch using clothespin squeeze. 1/5/22: hand compression garments issued ([employer]wear), wear as much as tolerated, including at night. 2/7/22: start static hold full fist stretches x 5 min, 3x/day, and MCP flexion stretches w/ holds x 5 min. [employer] [facility]  Treatment/Session Summary:    · Response to Treatment: R 3rd finger PIP flexion to 90 deg today.  No pain with resisted thumb trainign, which hasn't happened since start of PT .     · Communication/Consultation:  None today  · Equipment provided today:  None today   · Recommendations/Intent for next treatment session: Emphasis on a/p/rrom to regain flexor tendon/finger flexion RODRIGEZ and thumb opposition for normal /grasp, pronation/supination. Manual therapy for joint and tendon mobility.      Total Treatment Billable Duration:  43 minutes  PT Patient Time In/Time Out  Time In: 1100  Time Out: 250 [facility], PT    Future Appointments   Date Time Provider Kamille Reis   2/23/2022 11:00 AM Casie Ee, PT [facility]   2/25/2022 11:00 AM Casie Ee, PT [facility]   2/28/2022 11:00 AM Casie Ee, PT [facility]   3/2/2022 11:00 AM Casie Ee, PT [facility]   3/3/2022 11:00 AM Casie Ee, PT [facility]   3/7/2022 11:00 AM Casie Ee, PT [facility]   3/9/2022 11:00 AM Casie Ee, PT [facility]   3/11/2022 11:00 AM Casie Ee, PT [facility]   3/14/2022 11:00 AM Casie Ee, PT [facility]   3/16/2022 11:00 AM Casie Ee, PT [facility]   3/18/2022 11:00 AM Casie Ee, PT [facility]   3/21/2022 11:00 AM Casie Ee, PT [facility]   3/23/2022 11:00 AM Casie Ee, PT [facility]   3/25/2022 11:00 AM Casie Ee, PT [facility]   3/28/2022 11:00 AM Casie Ee, PT [facility]   3/30/2022 11:00 AM Kurfman, Garen Hodgkin, PT [facility]

## 2022-02-23 ENCOUNTER — HOSPITAL ENCOUNTER (OUTPATIENT)
Dept: PHYSICAL THERAPY | Age: 82
Discharge: HOME | End: 2022-02-23
Payer: COMMERCIAL

## 2022-02-23 PROCEDURE — 97110 THERAPEUTIC EXERCISES: CPT

## 2022-02-23 NOTE — PROGRESS NOTES
Brenda Black  : 1940  Primary: [facility]  Secondary:  Vy Hinton @ [address] [place].  [phone]   [phone]      OUTPATIENT PHYSICAL THERAPY: Daily Treatment Note  2022   ICD-10: Treatment Diagnosis: Pain in right wrist (M25.531), Stiffness of right wrist, not elsewhere classified (M25.631), Pain in left wrist (M25.532), Stiffness of left wrist, not elsewhere classified (M25.632), Stiffness of right hand, not elsewhere classified (M25.641) and Stiffness of left hand, not elsewhere classified (M25.642)  MEDICAL/REFERRING DIAGNOSIS:  Status post bilateral distal radius fractures   TREATMENT PLAN:  Effective Dates: 2022TO 3/2//2022 (60 days). Frequency/Duration: 2 times a week for 60 Days  GOALS: (Goals have been discussed and agreed upon with patient.)  Short-Term Functional Goals: Time Frame: 4 weeks:   1. Independent performance of home exercise program (MET)  2. Reduce R hand edema to normal levels to allow normal finger to palm fist motions (Progressing)  3. Increase R wrist/hand ROM's to 75% or better of normal  In all planes of motion (MET)  Discharge Goals: Time Frame: 12 weeks  1. Pt to demonstrate at least 35#   Strength L/R for normal weight carrying (progressing)  2. Pt to demonstrate L/R wrist/ hand ROM's 85% or better of normal all planes (progressing)  3. Improve Quick-DASH scores to 15/55 or better to reflect return to normal ADL function (progressing)  Rehabilitation Potential For Stated Goals: Good  _______________________________________________________________________  Pre-treatment Symptoms/Complaints: Slowly improving hand ROM, stiffness. Still wakes up with really stiff fingers.    Pain: Initial: 0/10 Post Session:  No increase/10   Medications Last Reviewed:  2022  Updated Objective Findings:    Observation/Orthostatic Postural Assessment:    Pt is wearing right hand compressive glove. Palpation:    Diffuse right digital swelling, mild dorsal R hand swelling (12/29/2021)  ROM:    Left : Pt demonstrates ability to perform full left hook, open and full fists. AROM's : wrist flexion = 40 deg, extension = 45 deg, pronation/supination 75% of full AROM, Ulnar deviation = 18 deg, radial deviation = 20 deg. Right: Passive Dorena Arm digit ROM's: 2: passive RODRIGEZ now 75%. (12/22/2021), MCP flexion at index = 90 deg (1/22/22) , Thumb IP = full extension, 50% flexion, MCP = full extension, 50% flexion. CMC: 25% flexion. CMC radial abduction 40 deg. Wrist flexion P/AROM = 55 deg (1/5/2022), extension A/PROM = 40 deg/48 deg  Deg (1/5/22), Ulnar deviation = 15 deg PROM, radial deviation = to 15 deg. R forearm active/passive. pronation/supination  = ~ 60 deg to 75 deg (12/29/2021). Aptzxyjgml-ku-dubtekyb palmar crease measures: R hand: index and ring 4.5 cm (1/31/22)  Strength:   Left:  digits all 4-/5, Wrist flex/ext all 4/5  Right: digits all 3-/5, wrist flex/ext 4-/5   (1/19/22): 3rd run dynamometer: left = 32 #, right = 15#. 1/28/22: right = 15#    2/7/22: Fingernail to palm finger flexion measures R hand: Index = 3.5cm, Middle: 3.5 cm, Ring: 3.4 cm, 5th finger: 3.4 cm. Able to flex each MCP at digits 2-5 to 90 deg, and at each PIP to 90 deg, each w/stiff end feel. Able to lateral pinch with index to middle phalanx and tip, able to pinch to tip of middle digit with effort.  strength at 3rd rung = 17#. Quick-DASH score: 29/55  2/9/22: Pre treatment:   R hand ROM's: MCP flexion# 2 = 40 deg, #3 = 40 deg, # 4 = 40 deg, #5 = 30 deg. PIP flexion: #2 = 40 deg, # 3 = 50 deg, # 4 = 52 deg, # 5 = 50 deg,   DIP fleixon: #2 = 40 deg, # 3 = 30 deg, # 4 = 30 deg, # 5 = 28 deg. Fingernail to proximal palmar crease distances: #2 = 6cm, # 3 = 6 cm, # 4 = 5.5 cm, # 5 = 4.3 cm. Post treatment:   MCP flexion : #2 = 60 deg, # 3 = 55 deg  PIP fleixon: #2 = 88 deg, # 3 = 70 deg. 2/16/22:  Pre treatment:   R hand ROM's: MCP flexion# 2 = 54 deg, #3 = 44 deg, # 4 = 32 deg, #5 = 26 deg. PIP flexion: #2 = 40 deg, # 3 = 50 deg, # 4 = 55 deg, # 5 = 50 deg,   DIP fleixon: #2 = 32 deg, # 3 = 32 deg, # 4 = 42 deg, # 5 = 44 deg. Post treatment fingertipe to prox. Palmar crease distance: index = 4.6cm, middle = 4.6 cm. : #2: left = 16#, right = 26#, #4: left = 18#, right = 32#. TREATMENT:   THERAPEUTIC ACTIVITY: ( see below for minutes): Therapeutic activities per grid below to improve mobility, strength, balance and coordination. Required minimal visual, verbal, manual and tactile cues to improve independence and safety with daily activities . THERAPEUTIC EXERCISE: (see below for minutes):  Exercises per grid below to improve mobility, strength, balance and coordination. Required minimal verbal and manual cues to promote proper body alignment, promote proper body posture and promote proper body mechanics. Progressed resistance, range, repetitions and complexity of movement as indicated. MANUAL THERAPY: (see below for minutes): Joint mobilization and Soft tissue mobilization was utilized and necessary because of the patient's restricted joint motion, painful spasm, loss of articular motion and restricted motion of soft tissue. MODALITIES: (see below for minutes):      to decrease pain    SELF CARE: (see below for minutes): Procedure(s) (per grid) utilized to improve and/or restore self-care/home management as related to dressing, bathing and grooming. Required minimal verbal cueing to facilitate activities of daily living skills and compensatory activities.      Date: 2/22/22 (visit 38) 2/23/22 (visit 39)        Modalities:                                    Therapeutic Exercise: 43 min 43 min        Finger flexion stretching: MCP 2-3-4-5  static hold x  2 min, PIP static holds x 4 min, combined hook fist and full fist flexion holds x 10-15\" reps x 10 min Finger flexor stretching: MCP flexion in lumbrical table position x 4 min. MCP/PIP/DIP fingers 2-5 , hook fist (PIP/DIP) 2-5 and individual DIP flexion stretching. 20 min total        Finger extension stretching /tractioning x 2 min 2-3-4-5  Finger extension stretching /tractioning x 2 min 2-3-4-5         Manl resist MCP/PIP fleixon 3 x 15 2-3, 2/x 15 4-5, and full fist flexions x 15 Thumb stretching: MCP-PIP x 2 min, CMC abduction x 1 min, flexion x 1 min        Thumb stretching: MCP-PIP x 2 min, CMC abduction x 1 min, flexion x 1 min Manl resist MCP/PIP flexion 2 x 15 2-3, 2/x 15 4-5,        Thumb flexion curls manl resist x 15 Hand gripper full range x 20        Theraputty brown pinch/pulls bilat index-thumb x 15 PIP-DIP 2-5 flexions into theraputty brown x 15        PIP-DIP 2-5 flexions into theraputty brown x 15 Thumb flexions into putty x 15,        Thumb flexions into putty x 15,          Manl resist thumb abduction x 15                                   Proprioceptive Activities:                                    Manual Therapy:                                             Therapeutic Activities:                                      HEP: Exercises  Exercises  Seated Wrist Extension PROM - 2 x daily - 7 x weekly - 5 reps - 15 second hold  Seated Wrist Prayer Stretch - 2 x daily - 7 x weekly - 5 sets - 15 second hold  Seated Wrist Flexion with Overpressure - 2 x daily - 7 x weekly - 10 reps - 15 second hold  Seated Finger Composite Flexion Stretch - 2 x daily - 7 x weekly - 3 reps - 2 minuessccond hold  Wrist Pronation Stretch - 2 x daily - 7 x weekly - 5 sets - 15 second hold  Seated Wrist Supination Stretch - 2 x daily - 7 x weekly - 5 sets - 15 second hold    Added 12/10/2021: Emphasize 5 to 10 min holds on steady finger flexion stretch (MCP/IP to palm). OK to work on thumb-finger pinch using clothespin squeeze. 1/5/22: hand compression garments issued ([employer]rediwear), wear as much as tolerated, including at night.   2/7/22: start static hold full fist stretches x 5 min, 3x/day, and MCP flexion stretches w/ holds x 5 min. [facility] Portal  Treatment/Session Summary:    · Response to Treatment: Hook fist  ROM continues to be limited due to lack of PIP flexion. · Communication/Consultation:  None today  · Equipment provided today:  None today   · Recommendations/Intent for next treatment session: Emphasis on a/p/rrom to regain flexor tendon/finger flexion RODRIGEZ and thumb opposition for normal /grasp, pronation/supination. Manual therapy for joint and tendon mobility.      Total Treatment Billable Duration:  43 minutes  PT Patient Time In/Time Out  Time In: 1100  Time Out: 250 [facility], PT    Future Appointments   Date Time Provider Kamille Reis   2/25/2022 11:00 AM Yifan Doctor, PT [facility]   2/28/2022 11:00 AM Yifan Doctor, PT [facility]   3/2/2022 11:00 [facility]   3/3/2022  7:00 PM [facility]   3/7/2022 11:00 AM Yifan Doctor, PT [facility]   3/9/2022 11:00 AM Yifan Doctor, PT [facility]   3/11/2022 11:00 AM Yifan Doctor, PT [facility]   3/14/2022 11:00 AM Yifan Doctor, PT [facility]   3/16/2022 11:00 AM Yifan Doctor, PT [facility]   3/18/2022 11:00 AM Yifan Doctor, PT [facility]   3/21/2022 11:00 AM Yifan Doctor, PT [facility]   3/23/2022 11:00 AM Yifan Doctor, PT [facility]   3/25/2022 11:00 [facility]   3/28/2022 11:00 AM [facility]   3/30/2022 11:00 AM [facility]agan[facility]

## 2022-02-25 ENCOUNTER — HOSPITAL ENCOUNTER (OUTPATIENT)
Dept: PHYSICAL THERAPY | Age: 82
Discharge: HOME | End: 2022-02-25
Payer: COMMERCIAL

## 2022-02-25 PROCEDURE — 97110 THERAPEUTIC EXERCISES: CPT

## 2022-02-25 NOTE — PROGRESS NOTES
Brenda Black  : 1940  Primary: [facility]  Secondary:  [facility] @ [address]  [phone]   [phone]      OUTPATIENT PHYSICAL THERAPY: Progress Report/Daily Treatment Note  2022   ICD-10: Treatment Diagnosis: Pain in right wrist (M25.531), Stiffness of right wrist, not elsewhere classified (M25.631), Pain in left wrist (M25.532), Stiffness of left wrist, not elsewhere classified (M25.632), Stiffness of right hand, not elsewhere classified (M25.641) and Stiffness of left hand, not elsewhere classified (M25.642)  MEDICAL/REFERRING DIAGNOSIS:  Status post bilateral distal radius fractures   TREATMENT PLAN:  Effective Dates: 2022TO 3/2//2022 (60 days). Frequency/Duration: 2 times a week for 60 Days  GOALS: (Goals have been discussed and agreed upon with patient.)  Short-Term Functional Goals: Time Frame: 4 weeks:   1. Independent performance of home exercise program (MET)  2. Reduce R hand edema to normal levels to allow normal finger to palm fist motions (Progressing)  3. Increase R wrist/hand ROM's to 75% or better of normal  In all planes of motion (MET)  Discharge Goals: Time Frame: 12 weeks  1. Pt to demonstrate at least 35#   Strength L/R for normal weight carrying (progressing)  2. Pt to demonstrate L/R wrist/ hand ROM's 85% or better of normal all planes (progressing)  3. Improve Quick-DASH scores to 15/55 or better to reflect return to normal ADL function (progressing)  Rehabilitation Potential For Stated Goals: Good  _______________________________________________________________________  Pre-treatment Symptoms/Complaints: Forgot to wear her compression glove last night, really noticed the difference this morning, hand was stiffer/more swollen.    Pain: Initial: 0/10 Post Session:  No increase/10   Medications Last Reviewed:  2022  Updated Objective Findings: Observation/Orthostatic Postural Assessment:    Pt is wearing right hand compressive glove. Palpation:    Diffuse right digital swelling, mild dorsal R hand swelling (12/29/2021)  ROM:    Left : Pt demonstrates ability to perform full left hook, open and full fists. AROM's : wrist flexion = 40 deg, extension = 45 deg, pronation/supination 75% of full AROM, Ulnar deviation = 18 deg, radial deviation = 20 deg. Right: Passive Lesa Eusebia digit ROM's: 2: passive RODRIGEZ now 75%. (12/22/2021), MCP flexion at index = 90 deg (1/22/22) , Thumb IP = full extension, 50% flexion, MCP = full extension, 50% flexion. CMC: 25% flexion. CMC radial abduction 40 deg. Wrist flexion P/AROM = 55 deg (1/5/2022), extension A/PROM = 40 deg/48 deg  Deg (1/5/22), Ulnar deviation = 15 deg PROM, radial deviation = to 15 deg. R forearm active/passive. pronation/supination  = ~ 60 deg to 75 deg (12/29/2021). Uckrwqhjvy-os-psdrxpmg palmar crease measures: R hand: index and ring 4.5 cm (1/31/22)  Strength:   Left:  digits all 4-/5, Wrist flex/ext all 4/5  Right: digits all 3-/5, wrist flex/ext 4-/5   (1/19/22): 3rd run dynamometer: left = 32 #, right = 15#. 1/28/22: right = 15#    2/7/22: Fingernail to palm finger flexion measures R hand: Index = 3.5cm, Middle: 3.5 cm, Ring: 3.4 cm, 5th finger: 3.4 cm. Able to flex each MCP at digits 2-5 to 90 deg, and at each PIP to 90 deg, each w/stiff end feel. Able to lateral pinch with index to middle phalanx and tip, able to pinch to tip of middle digit with effort.  strength at 3rd rung = 17#. Quick-DASH score: 29/55  2/9/22: Pre treatment:   R hand ROM's: MCP flexion# 2 = 40 deg, #3 = 40 deg, # 4 = 40 deg, #5 = 30 deg. PIP flexion: #2 = 40 deg, # 3 = 50 deg, # 4 = 52 deg, # 5 = 50 deg,   DIP fleixon: #2 = 40 deg, # 3 = 30 deg, # 4 = 30 deg, # 5 = 28 deg. Fingernail to proximal palmar crease distances: #2 = 6cm, # 3 = 6 cm, # 4 = 5.5 cm, # 5 = 4.3 cm.    Post treatment:   MCP flexion : #2 = 60 deg, # 3 = 55 deg  PIP fleixon: #2 = 88 deg, # 3 = 70 deg. 2/16/22:  Pre treatment:   R hand ROM's: MCP flexion# 2 = 54 deg, #3 = 44 deg, # 4 = 32 deg, #5 = 26 deg. PIP flexion: #2 = 40 deg, # 3 = 50 deg, # 4 = 55 deg, # 5 = 50 deg,   DIP fleixon: #2 = 32 deg, # 3 = 32 deg, # 4 = 42 deg, # 5 = 44 deg. Post treatment fingertipe to prox. Palmar crease distance: index = 4.6cm, middle = 4.6 cm. : #2: left = 16#, right = 26#, #4: left = 18#, right = 32#.   2/25/22: Wrist PROM's : flexion = 52 deg, extn = 38 deg, radial dev. = 12 deg, ulnar dev = 25 deg. Quick DASH = 32/55. TREATMENT:   THERAPEUTIC ACTIVITY: ( see below for minutes): Therapeutic activities per grid below to improve mobility, strength, balance and coordination. Required minimal visual, verbal, manual and tactile cues to improve independence and safety with daily activities . THERAPEUTIC EXERCISE: (see below for minutes):  Exercises per grid below to improve mobility, strength, balance and coordination. Required minimal verbal and manual cues to promote proper body alignment, promote proper body posture and promote proper body mechanics. Progressed resistance, range, repetitions and complexity of movement as indicated. MANUAL THERAPY: (see below for minutes): Joint mobilization and Soft tissue mobilization was utilized and necessary because of the patient's restricted joint motion, painful spasm, loss of articular motion and restricted motion of soft tissue. MODALITIES: (see below for minutes):      to decrease pain    SELF CARE: (see below for minutes): Procedure(s) (per grid) utilized to improve and/or restore self-care/home management as related to dressing, bathing and grooming. Required minimal verbal cueing to facilitate activities of daily living skills and compensatory activities.      Date: 2/22/22 (visit 38) 2/23/22 (visit 44)  2/25/22 (visit 40)      Modalities:                                    Therapeutic Exercise: 43 min 43 min 38 min       Finger flexion stretching: MCP 2-3-4-5  static hold x  2 min, PIP static holds x 4 min, combined hook fist and full fist flexion holds x 10-15\" reps x 10 min Finger flexor stretching: MCP flexion in lumbrical table position x 4 min. MCP/PIP/DIP fingers 2-5 , hook fist (PIP/DIP) 2-5 and individual DIP flexion stretching. 20 min total Finger flexor stretching: MCP's 2-3 in lumbrical table, Focal PIP stretch 2--5 , DIP 2-5 and hook /open  And full fist static holds: 20 min       Finger extension stretching /tractioning x 2 min 2-3-4-5  Finger extension stretching /tractioning x 2 min 2-3-4-5  Finger extension stretching /tractioning x 2 min 2-3-4-5        Manl resist MCP/PIP fleixon 3 x 15 2-3, 2/x 15 4-5, and full fist flexions x 15 Thumb stretching: MCP-PIP x 2 min, CMC abduction x 1 min, flexion x 1 min Thumb stretching: MCP-PIP x 2 min,       Thumb stretching: MCP-PIP x 2 min, CMC abduction x 1 min, flexion x 1 min Manl resist MCP/PIP flexion 2 x 15 2-3, 2/x 15 4-5, Manl resist PIP/DIP flexions 2-3 2 x 15, 2-3-4-5 2 x 15       Thumb flexion curls manl resist x 15 Hand gripper full range x 20 Thumb flexions into putty x 15,       Theraputty brown pinch/pulls bilat index-thumb x 15 PIP-DIP 2-5 flexions into theraputty brown x 15 R hand putty pinch - pulls x 15       PIP-DIP 2-5 flexions into theraputty brown x 15 Thumb flexions into putty x 15, Passive stretch R wrist extension x 2 min       Thumb flexions into putty x 15,   Mal resist wrist flexion/wrist extn 2 x 15 each.         Manl resist thumb abduction x 15                                   Proprioceptive Activities:                                    Manual Therapy:                                             Therapeutic Activities:                                      HEP: Exercises  Exercises  Seated Wrist Extension PROM - 2 x daily - 7 x weekly - 5 reps - 15 second hold  Seated Wrist Prayer Stretch - 2 x daily - 7 x weekly - 5 sets - 15 second hold  Seated Wrist Flexion with Overpressure - 2 x daily - 7 x weekly - 10 reps - 15 second hold  Seated Finger Composite Flexion Stretch - 2 x daily - 7 x weekly - 3 reps - 2 minuessccond hold  Wrist Pronation Stretch - 2 x daily - 7 x weekly - 5 sets - 15 second hold  Seated Wrist Supination Stretch - 2 x daily - 7 x weekly - 5 sets - 15 second hold    Added 12/10/2021: Emphasize 5 to 10 min holds on steady finger flexion stretch (MCP/IP to palm). OK to work on thumb-finger pinch using clothespin squeeze. 1/5/22: hand compression garments issued ([employer]), wear as much as tolerated, including at night. 2/7/22: start static hold full fist stretches x 5 min, 3x/day, and MCP flexion stretches w/ holds x 5 min. [employer]  Treatment/Session Summary:    · Response to Treatment: Steady but consistent LTG progress. Limiters at this point are PIP flexion and tendon glide to allow full fist positions. · Communication/Consultation:  None today  · Equipment provided today:  None today   · Recommendations/Intent for next treatment session: Emphasis on a/p/rrom to regain flexor tendon/finger flexion RODRIGEZ and thumb opposition for normal /grasp, pronation/supination. Manual therapy for joint and tendon mobility.      Total Treatment Billable Duration:  38 minutes  PT Patient Time In/Time Out  Time In: 1110  Time Out: Hari Gill, PT    Future Appointments   Date Time Provider Kamille Reis   2/28/2022 11:00 AM Ivette Rodríguez PT [facility]   3/2/2022 11:00 AM HARLEY Bangura [facility]   3/3/2022  7:00 PM HARLEY Bangura [facility]   3/7/2022 11:00 AM HARLEY Bangura [facility]   3/9/2022 11:00 [facility]   3/11/2022 11:00 AM [facility]   3/14/2022 11:00 AM HARLEY Bangura [facility]   3/16/2022 11:00 AM Littie Marcos, PT [facility]   3/18/2022 11:00 AM Matilda Fox, PT [facility]   3/21/2022 11:00 AM Matilda Fox, PT [facility]   3/23/2022 11:00 AM Matilda Fox, PT [facility]   3/25/2022 11:00 AM Matilda Fox, PT [facility]   3/28/2022 11:00 AM Matilda Fox, PT [facility]   3/30/2022 11:00 AM Tawny Damico, PT [facility]

## 2022-02-28 ENCOUNTER — HOSPITAL ENCOUNTER (OUTPATIENT)
Dept: PHYSICAL THERAPY | Age: 82
Discharge: HOME | End: 2022-02-28
Payer: COMMERCIAL

## 2022-02-28 PROCEDURE — 97110 THERAPEUTIC EXERCISES: CPT

## 2022-02-28 NOTE — PROGRESS NOTES
Harris Black  : 1940  Primary: Harris [facility] [place]*  Secondary:  [address] [facility] @ [address]  [phone]   [phone]      OUTPATIENT PHYSICAL THERAPY: Progress Report/Daily Treatment Note  2022   ICD-10: Treatment Diagnosis: Pain in right wrist (M25.531), Stiffness of right wrist, not elsewhere classified (M25.631), Pain in left wrist (M25.532), Stiffness of left wrist, not elsewhere classified (M25.632), Stiffness of right hand, not elsewhere classified (M25.641) and Stiffness of left hand, not elsewhere classified (M25.642)  MEDICAL/REFERRING DIAGNOSIS:  Status post bilateral distal radius fractures   TREATMENT PLAN:  Effective Dates: 2022TO 3/2//2022 (60 days). Frequency/Duration: 2 times a week for 60 Days  GOALS: (Goals have been discussed and agreed upon with patient.)  Short-Term Functional Goals: Time Frame: 4 weeks:   1. Independent performance of home exercise program (MET)  2. Reduce R hand edema to normal levels to allow normal finger to palm fist motions (Progressing)  3. Increase R wrist/hand ROM's to 75% or better of normal  In all planes of motion (MET)  Discharge Goals: Time Frame: 12 weeks  1. Pt to demonstrate at least 35#   Strength L/R for normal weight carrying (progressing)  2. Pt to demonstrate L/R wrist/ hand ROM's 85% or better of normal all planes (progressing)  3. Improve Quick-DASH scores to 15/55 or better to reflect return to normal ADL function (progressing)  Rehabilitation Potential For Stated Goals: Good  _______________________________________________________________________  Pre-treatment Symptoms/Complaints: Able to use the hand for driving now. Thumb pain is less. Middle-ring-small finger function still limited with ADL's but can carry with R hand.   Pain: Initial: 0/10 Post Session:  No increase/10   Medications Last Reviewed:  2022  Updated Objective Findings:    Observation/Orthostatic Postural Assessment:    Pt is wearing right hand compressive glove. Palpation:    Diffuse right digital swelling, mild dorsal R hand swelling (12/29/2021)  ROM:    Left : Pt demonstrates ability to perform full left hook, open and full fists. AROM's : wrist flexion = 40 deg, extension = 45 deg, pronation/supination 75% of full AROM, Ulnar deviation = 18 deg, radial deviation = 20 deg. Right: Passive Beatriz Moulton digit ROM's: 2: passive RODRIGEZ now 75%. (12/22/2021), MCP flexion at index = 90 deg (1/22/22) , Thumb IP = full extension, 50% flexion, MCP = full extension, 50% flexion. CMC: 25% flexion. CMC radial abduction 40 deg. Wrist flexion P/AROM = 55 deg (1/5/2022), extension A/PROM = 40 deg/48 deg  Deg (1/5/22), Ulnar deviation = 15 deg PROM, radial deviation = to 15 deg. R forearm active/passive. pronation/supination  = ~ 60 deg to 75 deg (12/29/2021). Pfivrpxbin-ng-nxfdtlwk palmar crease measures: R hand: index and ring 4.5 cm (1/31/22)  Strength:   Left:  digits all 4-/5, Wrist flex/ext all 4/5  Right: digits all 3-/5, wrist flex/ext 4-/5   (1/19/22): 3rd run dynamometer: left = 32 #, right = 15#. 1/28/22: right = 15#    2/7/22: Fingernail to palm finger flexion measures R hand: Index = 3.5cm, Middle: 3.5 cm, Ring: 3.4 cm, 5th finger: 3.4 cm. Able to flex each MCP at digits 2-5 to 90 deg, and at each PIP to 90 deg, each w/stiff end feel. Able to lateral pinch with index to middle phalanx and tip, able to pinch to tip of middle digit with effort.  strength at 3rd rung = 17#. Quick-DASH score: 29/55  2/9/22: Pre treatment:   R hand ROM's: MCP flexion# 2 = 40 deg, #3 = 40 deg, # 4 = 40 deg, #5 = 30 deg. PIP flexion: #2 = 40 deg, # 3 = 50 deg, # 4 = 52 deg, # 5 = 50 deg,   DIP fleixon: #2 = 40 deg, # 3 = 30 deg, # 4 = 30 deg, # 5 = 28 deg. Fingernail to proximal palmar crease distances: #2 = 6cm, # 3 = 6 cm, # 4 = 5.5 cm, # 5 = 4.3 cm.    Post treatment:   MCP flexion : #2 = 60 deg, # 3 = 55 deg  PIP fleixon: #2 = 88 deg, # 3 = 70 deg. 2/16/22:  Pre treatment:   R hand ROM's: MCP flexion# 2 = 54 deg, #3 = 44 deg, # 4 = 32 deg, #5 = 26 deg. PIP flexion: #2 = 40 deg, # 3 = 50 deg, # 4 = 55 deg, # 5 = 50 deg,   DIP fleixon: #2 = 32 deg, # 3 = 32 deg, # 4 = 42 deg, # 5 = 44 deg. Post treatment fingertipe to prox. Palmar crease distance: index = 4.6cm, middle = 4.6 cm. : #2: left = 16#, right = 26#, #4: left = 18#, right = 32#.   2/25/22: Wrist PROM's : flexion = 52 deg, extn = 38 deg, radial dev. = 12 deg, ulnar dev = 25 deg. Quick DASH = 32/55. TREATMENT:   THERAPEUTIC ACTIVITY: ( see below for minutes): Therapeutic activities per grid below to improve mobility, strength, balance and coordination. Required minimal visual, verbal, manual and tactile cues to improve independence and safety with daily activities . THERAPEUTIC EXERCISE: (see below for minutes):  Exercises per grid below to improve mobility, strength, balance and coordination. Required minimal verbal and manual cues to promote proper body alignment, promote proper body posture and promote proper body mechanics. Progressed resistance, range, repetitions and complexity of movement as indicated. MANUAL THERAPY: (see below for minutes): Joint mobilization and Soft tissue mobilization was utilized and necessary because of the patient's restricted joint motion, painful spasm, loss of articular motion and restricted motion of soft tissue. MODALITIES: (see below for minutes):      to decrease pain    SELF CARE: (see below for minutes): Procedure(s) (per grid) utilized to improve and/or restore self-care/home management as related to dressing, bathing and grooming. Required minimal verbal cueing to facilitate activities of daily living skills and compensatory activities.      Date: 2/22/22 (visit 45) 2/23/22 (visit 44)  2/25/22 (visit 40) 2/28/22 (visit 41)     Modalities: Therapeutic Exercise: 43 min 43 min 38 min 43 min      Finger flexion stretching: MCP 2-3-4-5  static hold x  2 min, PIP static holds x 4 min, combined hook fist and full fist flexion holds x 10-15\" reps x 10 min Finger flexor stretching: MCP flexion in lumbrical table position x 4 min. MCP/PIP/DIP fingers 2-5 , hook fist (PIP/DIP) 2-5 and individual DIP flexion stretching. 20 min total Finger flexor stretching: MCP's 2-3 in lumbrical table, Focal PIP stretch 2--5 , DIP 2-5 and hook /open  And full fist static holds: 20 min Finger flexor stretching w/ MCP\"s (lumbrical table). Focal PIP stretch 2--5 , DIP 2-5 and hook /open  And full fist static holds: 20 min      Finger extension stretching /tractioning x 2 min 2-3-4-5  Finger extension stretching /tractioning x 2 min 2-3-4-5  Finger extension stretching /tractioning x 2 min 2-3-4-5  Finger extension stretching /tractioning x 2 min 2-3-4-5       Manl resist MCP/PIP fleixon 3 x 15 2-3, 2/x 15 4-5, and full fist flexions x 15 Thumb stretching: MCP-PIP x 2 min, CMC abduction x 1 min, flexion x 1 min Thumb stretching: MCP-PIP x 2 min, Thumb stretching: MCP-PIP x 2 min,      Thumb stretching: MCP-PIP x 2 min, CMC abduction x 1 min, flexion x 1 min Manl resist MCP/PIP flexion 2 x 15 2-3, 2/x 15 4-5, Manl resist PIP/DIP flexions 2-3 2 x 15, 2-3-4-5 2 x 15 Resisted: full flexion 2-5: 2 x 15 manual. 15x w/ putty,       Thumb flexion curls manl resist x 15 Hand gripper full range x 20 Thumb flexions into putty x 15, Thumb flexions into putty x 15,      Theraputty brown pinch/pulls bilat index-thumb x 15 PIP-DIP 2-5 flexions into theraputty brown x 15 R hand putty pinch - pulls x 15 End range wrist extension and supination stretching : 4 min      PIP-DIP 2-5 flexions into theraputty brown x 15 Thumb flexions into putty x 15, Passive stretch R wrist extension x 2 min       Thumb flexions into putty x 15,   Manl resist wrist flexion/wrist extn 2 x 15 each. Manl resist thumb abduction x 15                                   Proprioceptive Activities:                                    Manual Therapy:                                             Therapeutic Activities:                                      HEP: Exercises  Exercises  Seated Wrist Extension PROM - 2 x daily - 7 x weekly - 5 reps - 15 second hold  Seated Wrist Prayer Stretch - 2 x daily - 7 x weekly - 5 sets - 15 second hold  Seated Wrist Flexion with Overpressure - 2 x daily - 7 x weekly - 10 reps - 15 second hold  Seated Finger Composite Flexion Stretch - 2 x daily - 7 x weekly - 3 reps - 2 minuessccond hold  Wrist Pronation Stretch - 2 x daily - 7 x weekly - 5 sets - 15 second hold  Seated Wrist Supination Stretch - 2 x daily - 7 x weekly - 5 sets - 15 second hold    Added 12/10/2021: Emphasize 5 to 10 min holds on steady finger flexion stretch (MCP/IP to palm). OK to work on thumb-finger pinch using clothespin squeeze. 1/5/22: hand compression garments issued (Pracceldiwear), wear as much as tolerated, including at night. 2/7/22: start static hold full fist stretches x 5 min, 3x/day, and MCP flexion stretches w/ holds x 5 min. [employer]  Treatment/Session Summary:    · Response to Treatment: Able to improve ROM during each of recent treatments, but with partial regression to baseline incomplete ROM at start of next treatment. · Communication/Consultation:  None today  · Equipment provided today:  None today   · Recommendations/Intent for next treatment session: Emphasis on a/p/rrom to regain flexor tendon/finger flexion RODRIGEZ and thumb opposition for normal /grasp, pronation/supination. Manual therapy for joint and tendon mobility.      Total Treatment Billable Duration:  43 minutes  PT Patient Time In/Time Out  Time In: 1100  Time Out: 250 [facility], PT    Future Appointments   Date Time Provider Kamille Reis   3/2/2022 11:00 AM Bertha Barragan, PT [facility] 3/3/2022  7:00 PM Daria Owen, PT [facility]   3/7/2022 11:00 AM Daria Owen, PT [facility]   3/9/2022 11:00 AM Daria Owen, PT [facility]   3/11/2022 11:00 AM Daria Owen, PT [facility]   3/14/2022 11:00 AM Daria Owen, PT [facility]   3/16/2022 11:00 AM Daria Owen, PT [facility]   3/18/2022 11:00 AM Daria Owen, PT [facility]   3/21/2022 11:00 AM [facility]   3/23/2022 11:00 AM [facility]   [facility]/25[facility]22 [facility]   3/28/2022 11:00 [facility]   3/30/2022 11:00 AM Furman Goltz, Hiram Witt, PT [facility]

## 2022-03-02 ENCOUNTER — HOSPITAL ENCOUNTER (OUTPATIENT)
Dept: PHYSICAL THERAPY | Age: 82
Discharge: HOME | End: 2022-03-02
Payer: COMMERCIAL

## 2022-03-02 PROCEDURE — 97110 THERAPEUTIC EXERCISES: CPT

## 2022-03-02 NOTE — PROGRESS NOTES
Marilee Black  : 1940  Primary: Harris [facility] [place]*  Secondary:  [address] @ [address] [place].  [phone]   [phone]      OUTPATIENT PHYSICAL THERAPY: Progress Report/Daily Treatment Note  3/2/2022   ICD-10: Treatment Diagnosis: Pain in right wrist (M25.531), Stiffness of right wrist, not elsewhere classified (M25.631), Pain in left wrist (M25.532), Stiffness of left wrist, not elsewhere classified (M25.632), Stiffness of right hand, not elsewhere classified (M25.641) and Stiffness of left hand, not elsewhere classified (M25.642)  MEDICAL/REFERRING DIAGNOSIS:  Status post bilateral distal radius fractures   TREATMENT PLAN:  Effective Dates: 2022TO 3/2//2022 (60 days). Frequency/Duration: 2 times a week for 60 Days  GOALS: (Goals have been discussed and agreed upon with patient.)  Short-Term Functional Goals: Time Frame: 4 weeks:   1. Independent performance of home exercise program (MET)  2. Reduce R hand edema to normal levels to allow normal finger to palm fist motions (Progressing)  3. Increase R wrist/hand ROM's to 75% or better of normal  In all planes of motion (MET)  Discharge Goals: Time Frame: 12 weeks  1. Pt to demonstrate at least 35#   Strength L/R for normal weight carrying (progressing)  2. Pt to demonstrate L/R wrist/ hand ROM's 85% or better of normal all planes (progressing)  3. Improve Quick-DASH scores to 15/55 or better to reflect return to normal ADL function (progressing)  Rehabilitation Potential For Stated Goals: Good  _______________________________________________________________________  Pre-treatment Symptoms/Complaints: Incremental improvements in ROM in fingers, but persisting hand stiffness, Mbility increases lasting about 36 hours after treatments.    Pain: Initial: 0/10 Post Session:  No increase/10   Medications Last Reviewed:  3/2/2022  Updated Objective Findings: Observation/Orthostatic Postural Assessment:    Pt is wearing right hand compressive glove. Palpation:    Diffuse right digital swelling, mild dorsal R hand swelling (12/29/2021)  ROM:    Left : Pt demonstrates ability to perform full left hook, open and full fists. AROM's : wrist flexion = 40 deg, extension = 45 deg, pronation/supination 75% of full AROM, Ulnar deviation = 18 deg, radial deviation = 20 deg. Right: Passive Beatriz Moulton digit ROM's: 2: passive RODRIGEZ now 75%. (12/22/2021), MCP flexion at index = 90 deg (1/22/22) , Thumb IP = full extension, 50% flexion, MCP = full extension, 50% flexion. CMC: 25% flexion. CMC radial abduction 40 deg. Wrist flexion P/AROM = 55 deg (1/5/2022), extension A/PROM = 40 deg/48 deg  Deg (1/5/22), Ulnar deviation = 15 deg PROM, radial deviation = to 15 deg. R forearm active/passive. pronation/supination  = ~ 60 deg to 75 deg (12/29/2021). Buxeqdcqhu-zx-sfrnooap palmar crease measures: R hand: index and ring 4.5 cm (1/31/22)  Strength:   Left:  digits all 4-/5, Wrist flex/ext all 4/5  Right: digits all 3-/5, wrist flex/ext 4-/5   (1/19/22): 3rd run dynamometer: left = 32 #, right = 15#. 1/28/22: right = 15#    2/7/22: Fingernail to palm finger flexion measures R hand: Index = 3.5cm, Middle: 3.5 cm, Ring: 3.4 cm, 5th finger: 3.4 cm. Able to flex each MCP at digits 2-5 to 90 deg, and at each PIP to 90 deg, each w/stiff end feel. Able to lateral pinch with index to middle phalanx and tip, able to pinch to tip of middle digit with effort.  strength at 3rd rung = 17#. Quick-DASH score: 29/55  2/9/22: Pre treatment:   R hand ROM's: MCP flexion# 2 = 40 deg, #3 = 40 deg, # 4 = 40 deg, #5 = 30 deg. PIP flexion: #2 = 40 deg, # 3 = 50 deg, # 4 = 52 deg, # 5 = 50 deg,   DIP fleixon: #2 = 40 deg, # 3 = 30 deg, # 4 = 30 deg, # 5 = 28 deg. Fingernail to proximal palmar crease distances: #2 = 6cm, # 3 = 6 cm, # 4 = 5.5 cm, # 5 = 4.3 cm.    Post treatment:   MCP flexion : #2 = 60 deg, # 3 = 55 deg  PIP fleixon: #2 = 88 deg, # 3 = 70 deg. 2/16/22:  Pre treatment:   R hand ROM's: MCP flexion# 2 = 54 deg, #3 = 44 deg, # 4 = 32 deg, #5 = 26 deg. PIP flexion: #2 = 40 deg, # 3 = 50 deg, # 4 = 55 deg, # 5 = 50 deg,   DIP fleixon: #2 = 32 deg, # 3 = 32 deg, # 4 = 42 deg, # 5 = 44 deg. Post treatment fingertipe to prox. Palmar crease distance: index = 4.6cm, middle = 4.6 cm. : #2: left = 16#, right = 26#, #4: left = 18#, right = 32#.   2/25/22: Wrist PROM's : flexion = 52 deg, extn = 38 deg, radial dev. = 12 deg, ulnar dev = 25 deg. Quick DASH = 32/55. TREATMENT:   THERAPEUTIC ACTIVITY: ( see below for minutes): Therapeutic activities per grid below to improve mobility, strength, balance and coordination. Required minimal visual, verbal, manual and tactile cues to improve independence and safety with daily activities . THERAPEUTIC EXERCISE: (see below for minutes):  Exercises per grid below to improve mobility, strength, balance and coordination. Required minimal verbal and manual cues to promote proper body alignment, promote proper body posture and promote proper body mechanics. Progressed resistance, range, repetitions and complexity of movement as indicated. MANUAL THERAPY: (see below for minutes): Joint mobilization and Soft tissue mobilization was utilized and necessary because of the patient's restricted joint motion, painful spasm, loss of articular motion and restricted motion of soft tissue. MODALITIES: (see below for minutes):      to decrease pain    SELF CARE: (see below for minutes): Procedure(s) (per grid) utilized to improve and/or restore self-care/home management as related to dressing, bathing and grooming. Required minimal verbal cueing to facilitate activities of daily living skills and compensatory activities.      Date: 2/22/22 (visit 45) 2/23/22 (visit 44)  2/25/22 (visit 40) 2/28/22 (visit 41) 3/2/22 (visit 42)    Modalities: Therapeutic Exercise: 43 min 43 min 38 min 43 min 43 min     Finger flexion stretching: MCP 2-3-4-5  static hold x  2 min, PIP static holds x 4 min, combined hook fist and full fist flexion holds x 10-15\" reps x 10 min Finger flexor stretching: MCP flexion in lumbrical table position x 4 min. MCP/PIP/DIP fingers 2-5 , hook fist (PIP/DIP) 2-5 and individual DIP flexion stretching. 20 min total Finger flexor stretching: MCP's 2-3 in lumbrical table, Focal PIP stretch 2--5 , DIP 2-5 and hook /open  And full fist static holds: 20 min Finger flexor stretching w/ MCP\"s (lumbrical table). Focal PIP stretch 2--5 , DIP 2-5 and hook /open  And full fist static holds: 20 min Finger flexor stretching: MCP flexion at 2-3-4-5 (static holds), Hook and full fist flexion static holds, with emphasis on Index and middle finger.  18 min     Finger extension stretching /tractioning x 2 min 2-3-4-5  Finger extension stretching /tractioning x 2 min 2-3-4-5  Finger extension stretching /tractioning x 2 min 2-3-4-5  Finger extension stretching /tractioning x 2 min 2-3-4-5  Finger extension stretching /tractioning x 2 min 2-3-4-5      Manl resist MCP/PIP fleixon 3 x 15 2-3, 2/x 15 4-5, and full fist flexions x 15 Thumb stretching: MCP-PIP x 2 min, CMC abduction x 1 min, flexion x 1 min Thumb stretching: MCP-PIP x 2 min, Thumb stretching: MCP-PIP x 2 min, Thumb stretching: MCP-PIP x 2 min,     Thumb stretching: MCP-PIP x 2 min, CMC abduction x 1 min, flexion x 1 min Manl resist MCP/PIP flexion 2 x 15 2-3, 2/x 15 4-5, Manl resist PIP/DIP flexions 2-3 2 x 15, 2-3-4-5 2 x 15 Resisted: full flexion 2-5: 2 x 15 manual. 15x w/ putty,  Resisted finger full flexion 2-5: manl resist x 15, putty resist x 15     Thumb flexion curls manl resist x 15 Hand gripper full range x 20 Thumb flexions into putty x 15, Thumb flexions into putty x 15, Hand gripper x 30     Theraputty brown pinch/pulls bilat index-thumb x 15 PIP-DIP 2-5 flexions into theraputty brown x 15 R hand putty pinch - pulls x 15 End range wrist extension and supination stretching : 4 min [place]: bilat pronation x 30     PIP-DIP 2-5 flexions into theraputty brown x 15 Thumb flexions into putty x 15, Passive stretch R wrist extension x 2 min  Therabar green alternating wrist flexion/extension x 30     Thumb flexions into putty x 15,   Manl resist wrist flexion/wrist extn 2 x 15 each. Diana Fiore theraputty: thumb flexions x 20     Manl resist thumb abduction x 15    Theraputty index/middle/thumb pinch and pull aparts x 15                               Proprioceptive Activities:                                    Manual Therapy:                                             Therapeutic Activities:                                      HEP: Exercises  Exercises  Seated Wrist Extension PROM - 2 x daily - 7 x weekly - 5 reps - 15 second hold  Seated Wrist Prayer Stretch - 2 x daily - 7 x weekly - 5 sets - 15 second hold  Seated Wrist Flexion with Overpressure - 2 x daily - 7 x weekly - 10 reps - 15 second hold  Seated Finger Composite Flexion Stretch - 2 x daily - 7 x weekly - 3 reps - 2 minuessccond hold  Wrist Pronation Stretch - 2 x daily - 7 x weekly - 5 sets - 15 second hold  Seated Wrist Supination Stretch - 2 x daily - 7 x weekly - 5 sets - 15 second hold    Added 12/10/2021: Emphasize 5 to 10 min holds on steady finger flexion stretch (MCP/IP to palm). OK to work on thumb-finger pinch using clothespin squeeze. 1/5/22: hand compression garments issued ([employer]wear), wear as much as tolerated, including at night. 2/7/22: start static hold full fist stretches x 5 min, 3x/day, and MCP flexion stretches w/ holds x 5 min. [employer]  Treatment/Session Summary:    · Response to Treatment: Finally aboe to oppose thumb to 5th finger with mild assist, able to oppose to ring finger actively now.    · Communication/Consultation:  None today  · Equipment provided today:  None today · Recommendations/Intent for next treatment session: Emphasis on a/p/rrom to regain flexor tendon/finger flexion RODRIGEZ and thumb opposition for normal /grasp, pronation/supination. Manual therapy for joint and tendon mobility.      Total Treatment Billable Duration:  43 minutes  PT Patient Time In/Time Out  Time In: 1100  Time Out: 250 [facility], PT    Future Appointments   Date Time Provider Kamille Reis   3/3/2022  7:00 PM Dickerson Apa, PT [facility]   3/7/2022 11:00 AM Shivani Whitlock Blend, [facility]   3/9/2022 11:00 AM Dickerson Apa, PT [facility]   3/11/2022 11:00 AM Dickerson Apa, PT [facility]   3/14/2022 11:00 AM Dickerson Apa, PT [facility]   3/16/2022 11:00 AM Dickerson Apa, PT [facility]   3/18/2022 11:00 AM Dickerson Apa, [facility]   3/21/2022 11:00 AM Dickerson Apa, PT [facility]   3/23/2022 11:00 AM Dickerson Apa, PT [facility]   3/25/2022 11:00 AM Dickerson Apa, PT [facility]   3/28/2022 11:00 AM Dickerson Apa, PT [facility]   3/30/2022 11:00 AM Maddy Barraganion Blend, PT [facility]

## 2022-03-03 ENCOUNTER — HOSPITAL ENCOUNTER (OUTPATIENT)
Dept: PHYSICAL THERAPY | Age: 82
Discharge: HOME | End: 2022-03-03
Payer: COMMERCIAL

## 2022-03-03 NOTE — PROGRESS NOTES
Pt cancelled appt due to schedule conflict, will return at her next scheduled appointment.      LESLI Kendrick, PT, DPT, OCS, MTC

## 2022-03-07 ENCOUNTER — HOSPITAL ENCOUNTER (OUTPATIENT)
Dept: PHYSICAL THERAPY | Age: 82
Discharge: HOME | End: 2022-03-07
Payer: COMMERCIAL

## 2022-03-07 PROCEDURE — 97110 THERAPEUTIC EXERCISES: CPT

## 2022-03-07 NOTE — PROGRESS NOTES
Surya Black  : 1940  Primary: Sc Tania In[facility] Briana*  Secondary:  Marisela6 Bassem Avitia @ [address]  [phone]   [phone]      OUTPATIENT PHYSICAL THERAPY: RECERTIFICATION/Progress Report/Daily Treatment Note  3/9/2022   ICD-10: Treatment Diagnosis: Pain in right wrist (M25.531), Stiffness of right wrist, not elsewhere classified (M25.631), Pain in left wrist (M25.532), Stiffness of left wrist, not elsewhere classified (M25.632), Stiffness of right hand, not elsewhere classified (M25.641) and Stiffness of left hand, not elsewhere classified (M25.642)  MEDICAL/REFERRING DIAGNOSIS:  Status post bilateral distal radius fractures   TREATMENT PLAN:  Effective Dates: 3/7/22 to 22 (60 days). Frequency/Duration: 2 times a week for 60 Days  GOALS: (Goals have been discussed and agreed upon with patient.)  Short-Term Functional Goals: Time Frame: 4 weeks:   1. Independent performance of home exercise program (MET)  2. Reduce R hand edema to normal levels to allow normal finger to palm fist motions (Progressing)  3. Increase R wrist/hand ROM's to 75% or better of normal  In all planes of motion (MET)  Discharge Goals: Time Frame: 12 weeks  1. Pt to demonstrate at least 35#   Strength L/R for normal weight carrying (progressing)  2. Pt to demonstrate L/R wrist/ hand ROM's 85% or better of normal all planes (progressing)  3. Improve Quick-DASH scores to 15/55 or better to reflect return to normal ADL function (progressing)  Rehabilitation Potential For Stated Goals: Good  Regarding Surya Black's therapy, I certify that the treatment plan above will be carried out by a therapist or under their direction.   Thank you for this referral,  Elliot Cisneros, PT       Referring Physician Signature: Sofiya Turpin MD                                            Date _______________________________________________________________________  Pre-treatment Symptoms/Complaints: Pt states that her friend told her that her hand looks better, less swollen (hadn't seen her in a week). Pain: Initial: 0/10 Post Session:  No increase/10   Medications Last Reviewed:  3/9/2022  Updated Objective Findings:    Observation/Orthostatic Postural Assessment:    Pt is wearing right hand compressive glove. Palpation:    Diffuse right digital swelling, mild dorsal R hand swelling (12/29/2021)  ROM:    Left : Pt demonstrates ability to perform full left hook, open and full fists. AROM's : wrist flexion = 40 deg, extension = 45 deg, pronation/supination 75% of full AROM, Ulnar deviation = 18 deg, radial deviation = 20 deg. Right: Passive Vimal Martha digit ROM's: 2: passive RODRIGEZ now 75%. (12/22/2021), MCP flexion at index = 90 deg (1/22/22) , Thumb IP = full extension, 50% flexion, MCP = full extension, 50% flexion. CMC: 25% flexion. CMC radial abduction 40 deg. Wrist flexion P/AROM = 55 deg (1/5/2022), extension A/PROM = 40 deg/48 deg  Deg (1/5/22), Ulnar deviation = 15 deg PROM, radial deviation = to 15 deg. R forearm active/passive. pronation/supination  = ~ 60 deg to 75 deg (12/29/2021). Ickxcobazg-bc-nzguawnj palmar crease measures: R hand: index and ring 4.5 cm (1/31/22)  Strength:   Left:  digits all 4-/5, Wrist flex/ext all 4/5  Right: digits all 3-/5, wrist flex/ext 4-/5   (1/19/22): 3rd run dynamometer: left = 32 #, right = 15#. 1/28/22: right = 15#    2/7/22: Fingernail to palm finger flexion measures R hand: Index = 3.5cm, Middle: 3.5 cm, Ring: 3.4 cm, 5th finger: 3.4 cm. Able to flex each MCP at digits 2-5 to 90 deg, and at each PIP to 90 deg, each w/stiff end feel. Able to lateral pinch with index to middle phalanx and tip, able to pinch to tip of middle digit with effort.  strength at 3rd rung = 17#.  Quick-DASH score: 29/55  2/9/22: Pre treatment:   R hand ROM's: MCP flexion# 2 = 40 deg, #3 = 40 deg, # 4 = 40 deg, #5 = 30 deg. PIP flexion: #2 = 40 deg, # 3 = 50 deg, # 4 = 52 deg, # 5 = 50 deg,   DIP fleixon: #2 = 40 deg, # 3 = 30 deg, # 4 = 30 deg, # 5 = 28 deg. Fingernail to proximal palmar crease distances: #2 = 6cm, # 3 = 6 cm, # 4 = 5.5 cm, # 5 = 4.3 cm. Post treatment:   MCP flexion : #2 = 60 deg, # 3 = 55 deg  PIP fleixon: #2 = 88 deg, # 3 = 70 deg. 2/16/22:  Pre treatment:   R hand ROM's: MCP flexion# 2 = 54 deg, #3 = 44 deg, # 4 = 32 deg, #5 = 26 deg. PIP flexion: #2 = 40 deg, # 3 = 50 deg, # 4 = 55 deg, # 5 = 50 deg,   DIP fleixon: #2 = 32 deg, # 3 = 32 deg, # 4 = 42 deg, # 5 = 44 deg. Post treatment fingertipe to prox. Palmar crease distance: index = 4.6cm, middle = 4.6 cm. : #2: left = 16#, right = 26#, #4: left = 18#, right = 32#.   2/25/22: Wrist PROM's : flexion = 52 deg, extn = 38 deg, radial dev. = 12 deg, ulnar dev = 25 deg. Quick DASH = 32/55. 3/7/22: PIP flexion PROM: 2nd = 80 deg, 3rd = 82 deg, 4th = 82 deg, 5th = 85 deg. TREATMENT:   THERAPEUTIC ACTIVITY: ( see below for minutes): Therapeutic activities per grid below to improve mobility, strength, balance and coordination. Required minimal visual, verbal, manual and tactile cues to improve independence and safety with daily activities . THERAPEUTIC EXERCISE: (see below for minutes):  Exercises per grid below to improve mobility, strength, balance and coordination. Required minimal verbal and manual cues to promote proper body alignment, promote proper body posture and promote proper body mechanics. Progressed resistance, range, repetitions and complexity of movement as indicated. MANUAL THERAPY: (see below for minutes): Joint mobilization and Soft tissue mobilization was utilized and necessary because of the patient's restricted joint motion, painful spasm, loss of articular motion and restricted motion of soft tissue.    MODALITIES: (see below for minutes):      to decrease pain    SELF CARE: (see below for minutes): Procedure(s) (per grid) utilized to improve and/or restore self-care/home management as related to dressing, bathing and grooming. Required minimal verbal cueing to facilitate activities of daily living skills and compensatory activities. Date: 2/22/22 (visit 45) 2/23/22 (visit 44)  2/25/22 (visit 40) 2/28/22 (visit 41) 3/2/22 (visit 42) 3/7/22 (visit 43)   Modalities:                                    Therapeutic Exercise: 43 min 43 min 38 min 43 min 43 min 44 min    Finger flexion stretching: MCP 2-3-4-5  static hold x  2 min, PIP static holds x 4 min, combined hook fist and full fist flexion holds x 10-15\" reps x 10 min Finger flexor stretching: MCP flexion in lumbrical table position x 4 min. MCP/PIP/DIP fingers 2-5 , hook fist (PIP/DIP) 2-5 and individual DIP flexion stretching. 20 min total Finger flexor stretching: MCP's 2-3 in lumbrical table, Focal PIP stretch 2--5 , DIP 2-5 and hook /open  And full fist static holds: 20 min Finger flexor stretching w/ MCP\"s (lumbrical table). Focal PIP stretch 2--5 , DIP 2-5 and hook /open  And full fist static holds: 20 min Finger flexor stretching: MCP flexion at 2-3-4-5 (static holds), Hook and full fist flexion static holds, with emphasis on Index and middle finger.  18 min Fingerl flexor stretching: static holds at MCP's 2-3, 4-5, Hook fist and full fist stretches 2-3, 2-5 combined with end range flexor tendon glide AROM: 20 min total    Finger extension stretching /tractioning x 2 min 2-3-4-5  Finger extension stretching /tractioning x 2 min 2-3-4-5  Finger extension stretching /tractioning x 2 min 2-3-4-5  Finger extension stretching /tractioning x 2 min 2-3-4-5  Finger extension stretching /tractioning x 2 min 2-3-4-5  Thumb stretching: MCP-PIP x 2 min,    Manl resist MCP/PIP fleixon 3 x 15 2-3, 2/x 15 4-5, and full fist flexions x 15 Thumb stretching: MCP-PIP x 2 min, CMC abduction x 1 min, flexion x 1 min Thumb stretching: MCP-PIP x 2 min, Thumb stretching: MCP-PIP x 2 min, Thumb stretching: MCP-PIP x 2 min, Yellow therball gripping x 20 , thumb flexions into ball x 20    Thumb stretching: MCP-PIP x 2 min, CMC abduction x 1 min, flexion x 1 min Manl resist MCP/PIP flexion 2 x 15 2-3, 2/x 15 4-5, Manl resist PIP/DIP flexions 2-3 2 x 15, 2-3-4-5 2 x 15 Resisted: full flexion 2-5: 2 x 15 manual. 15x w/ putty,  Resisted finger full flexion 2-5: manl resist x 15, putty resist x 15 Clothes pin squeezes (key pinch): thumb to index x 20, 5 reps of pin squeezes/pulls and squeeze/replace w/ index and ring fingers. Thumb flexion curls manl resist x 15 Hand gripper full range x 20 Thumb flexions into putty x 15, Thumb flexions into putty x 15, Hand gripper x 30 Manl resist 3 x 12: wrist flexion, extension, ulnar dev, radial dev. Theraputty brown pinch/pulls bilat index-thumb x 15 PIP-DIP 2-5 flexions into theraputty brown x 15 R hand putty pinch - pulls x 15 End range wrist extension and supination stretching : 4 min Therabar Green: bilat pronation x 30 Yellow putty key pinches: 12 each index, middle, ring finger. PIP-DIP 2-5 flexions into theraputty brown x 15 Thumb flexions into putty x 15, Passive stretch R wrist extension x 2 min  Therabar green alternating wrist flexion/extension x 30 Gray putty pinches x 10 , pinch/pulls x 10 (index to thumb)    Thumb flexions into putty x 15,   Manl resist wrist flexion/wrist extn 2 x 15 each.    Bishnu Malena theraputty: thumb flexions x 20     Manl resist thumb abduction x 15    Theraputty index/middle/thumb pinch and pull aparts x 15                               Proprioceptive Activities:                                    Manual Therapy:                                             Therapeutic Activities:                                      HEP: Exercises  Exercises  Seated Wrist Extension PROM - 2 x daily - 7 x weekly - 5 reps - 15 second hold  Seated Wrist Prayer Stretch - 2 x daily - 7 x weekly - 5 sets - 15 second hold  Seated Wrist Flexion with Overpressure - 2 x daily - 7 x weekly - 10 reps - 15 second hold  Seated Finger Composite Flexion Stretch - 2 x daily - 7 x weekly - 3 reps - 2 minuessccond hold  Wrist Pronation Stretch - 2 x daily - 7 x weekly - 5 sets - 15 second hold  Seated Wrist Supination Stretch - 2 x daily - 7 x weekly - 5 sets - 15 second hold    Added 12/10/2021: Emphasize 5 to 10 min holds on steady finger flexion stretch (MCP/IP to palm). OK to work on thumb-finger pinch using clothespin squeeze. 1/5/22: hand compression garments issued ([employer]), wear as much as tolerated, including at night. 2/7/22: start static hold full fist stretches x 5 min, 3x/day, and MCP flexion stretches w/ holds x 5 min. [employer]  Treatment/Session Summary:    · Response to Treatment: Passive gains in PIP flexion, allowing pt to get closer to making full fist again. · Communication/Consultation:  None today  · Equipment provided today:  None today   · Recommendations/Intent for next treatment session: Emphasis on a/p/rrom to regain flexor tendon/finger flexion RODRIGEZ and thumb opposition for normal /grasp, pronation/supination. Manual therapy for joint and tendon mobility.      Total Treatment Billable Duration:  44 minutes  PT Patient Time In/Time Out  Time In: 1100  Time Out: 250 [facility], PT    Future Appointments   Date Time Provider Kamille Reis   3/9/2022 11:00 AM Ward Larson, PT [facility]   3/11/2022 11:00 AM [facility]   3/14/2022 11:00 AM [facility]   3/16/2022 11:00 AM Ward Larson, [facility]   3/18/2022 11:00 AM Ward Larson, PT [facility]   3/21/2022 11:00 AM Ward Larson, PT [facility]   3/23/2022 11:00 AM Ward Larson, PT [facility]   3/25/2022 11:00 AM Ward Larson PT [facility]   3/28/2022 11:00 AM [place]   3/30/2022 11:00 AM Gladys Barragan PT [facility]

## 2022-03-09 ENCOUNTER — HOSPITAL ENCOUNTER (OUTPATIENT)
Dept: PHYSICAL THERAPY | Age: 82
Discharge: HOME | End: 2022-03-09
Payer: COMMERCIAL

## 2022-03-09 PROCEDURE — 97110 THERAPEUTIC EXERCISES: CPT

## 2022-03-09 NOTE — PROGRESS NOTES
Adrianne Black  : 1940  Primary: Sc Due[facility] Maricruz*  Secondary:  [address] @ [address]  [address] [place] [address].  [phone]   [phone]      OUTPATIENT PHYSICAL THERAPY: Daily Treatment Note  3/9/2022   ICD-10: Treatment Diagnosis: Pain in right wrist (M25.531), Stiffness of right wrist, not elsewhere classified (M25.631), Pain in left wrist (M25.532), Stiffness of left wrist, not elsewhere classified (M25.632), Stiffness of right hand, not elsewhere classified (M25.641) and Stiffness of left hand, not elsewhere classified (M25.642)  MEDICAL/REFERRING DIAGNOSIS:  Status post bilateral distal radius fractures   TREATMENT PLAN:  Effective Dates: 3/7/22 to 22 (60 days). Frequency/Duration: 2 times a week for 60 Days  GOALS: (Goals have been discussed and agreed upon with patient.)  Short-Term Functional Goals: Time Frame: 4 weeks:   1. Independent performance of home exercise program (MET)  2. Reduce R hand edema to normal levels to allow normal finger to palm fist motions (Progressing)  3. Increase R wrist/hand ROM's to 75% or better of normal  In all planes of motion (MET)  Discharge Goals: Time Frame: 12 weeks  1. Pt to demonstrate at least 35#   Strength L/R for normal weight carrying (progressing)  2. Pt to demonstrate L/R wrist/ hand ROM's 85% or better of normal all planes (progressing)  3. Improve Quick-DASH scores to 15/55 or better to reflect return to normal ADL function (progressing)  Rehabilitation Potential For Stated Goals: Good  _______________________________________________________________________  Pre-treatment Symptoms/Complaints: Less hand swelling. Base of thumb pain today  Pain: Initial: 3/10 Post Session:  No increase/10   Medications Last Reviewed:  3/9/2022  Updated Objective Findings:    Observation/Orthostatic Postural Assessment:    3/9/22: minimal R digit swelling.    Palpation:    Diffuse right digital swelling, mild dorsal R hand swelling (12/29/2021)  ROM:    Left : Pt demonstrates ability to perform full left hook, open and full fists. AROM's : wrist flexion = 40 deg, extension = 45 deg, pronation/supination 75% of full AROM, Ulnar deviation = 18 deg, radial deviation = 20 deg. Right: Passive Marilyn Maryanne digit ROM's: 2: passive RODRIGEZ now 75%. (12/22/2021), MCP flexion at index = 90 deg (1/22/22) , Thumb IP = full extension, 50% flexion, MCP = full extension, 50% flexion. CMC: 25% flexion. CMC radial abduction 40 deg. Wrist flexion P/AROM = 55 deg (1/5/2022), extension A/PROM = 40 deg/48 deg  Deg (1/5/22), Ulnar deviation = 15 deg PROM, radial deviation = to 15 deg. R forearm active/passive. pronation/supination  = ~ 60 deg to 75 deg (12/29/2021). [id number] palmar crease measures: R hand: index and ring 4.5 cm (1/31/22)  Strength:   Left:  digits all 4-/5, Wrist flex/ext all 4/5  Right: digits all 3-/5, wrist flex/ext 4-/5   (1/19/22): 3rd run dynamometer: left = 32 #, right = 15#. 1/28/22: right = 15#    2/7/22: Fingernail to palm finger flexion measures R hand: Index = 3.5cm, Middle: 3.5 cm, Ring: 3.4 cm, 5th finger: 3.4 cm. Able to flex each MCP at digits 2-5 to 90 deg, and at each PIP to 90 deg, each w/stiff end feel. Able to lateral pinch with index to middle phalanx and tip, able to pinch to tip of middle digit with effort.  strength at 3rd rung = 17#. Quick-DASH score: 29/55  2/9/22: Pre treatment:   R hand ROM's: MCP flexion# 2 = 40 deg, #3 = 40 deg, # 4 = 40 deg, #5 = 30 deg. PIP flexion: #2 = 40 deg, # 3 = 50 deg, # 4 = 52 deg, # 5 = 50 deg,   DIP fleixon: #2 = 40 deg, # 3 = 30 deg, # 4 = 30 deg, # 5 = 28 deg. Fingernail to proximal palmar crease distances: #2 = 6cm, # 3 = 6 cm, # 4 = 5.5 cm, # 5 = 4.3 cm. Post treatment:   MCP flexion : #2 = 60 deg, # 3 = 55 deg  PIP fleixon: #2 = 88 deg, # 3 = 70 deg.    2/16/22:  Pre treatment:   R hand ROM's: MCP flexion# 2 = 54 deg, #3 = 44 deg, # 4 = 32 deg, #5 = 26 deg. PIP flexion: #2 = 40 deg, # 3 = 50 deg, # 4 = 55 deg, # 5 = 50 deg,   DIP fleixon: #2 = 32 deg, # 3 = 32 deg, # 4 = 42 deg, # 5 = 44 deg. Post treatment fingertipe to prox. Palmar crease distance: index = 4.6cm, middle = 4.6 cm. : #2: left = 16#, right = 26#, #4: left = 18#, right = 32#.   2/25/22: Wrist PROM's : flexion = 52 deg, extn = 38 deg, radial dev. = 12 deg, ulnar dev = 25 deg. Quick DASH = 32/55. 3/7/22: PIP flexion PROM: 2nd = 80 deg, 3rd = 82 deg, 4th = 82 deg, 5th = 85 deg. TREATMENT:   THERAPEUTIC ACTIVITY: ( see below for minutes): Therapeutic activities per grid below to improve mobility, strength, balance and coordination. Required minimal visual, verbal, manual and tactile cues to improve independence and safety with daily activities . THERAPEUTIC EXERCISE: (see below for minutes):  Exercises per grid below to improve mobility, strength, balance and coordination. Required minimal verbal and manual cues to promote proper body alignment, promote proper body posture and promote proper body mechanics. Progressed resistance, range, repetitions and complexity of movement as indicated. MANUAL THERAPY: (see below for minutes): Joint mobilization and Soft tissue mobilization was utilized and necessary because of the patient's restricted joint motion, painful spasm, loss of articular motion and restricted motion of soft tissue. MODALITIES: (see below for minutes):      to decrease pain    SELF CARE: (see below for minutes): Procedure(s) (per grid) utilized to improve and/or restore self-care/home management as related to dressing, bathing and grooming. Required minimal verbal cueing to facilitate activities of daily living skills and compensatory activities.      Date: 3/9/22 (visit 44)         Modalities:                                    Therapeutic Exercise: 43 min         MCP static hold stretch : 2-3 x 3 min, 4-5 x 2 min, Hook fist stretch 2-3 and 4-5 as well as 2-5, 2nd digit tendon glide stretch x 10 reps         Wrist radial dev. Stretch x 2 min         Thumb stretching: MCP-PIP x 2 min         AROM: lumbrical table flexion x 15, hook fist AROM x 15         Hand gripper 15 x 5\"          Thumb to finger coordiation: index, middle, ring and 5th x 5 sets         Large marble pinch gripping : thumb to index, middle, right; 5 x 5\" each          Theraputty brown resisted hook fist x 15          Wrist flexion curls w/ 3# x 20          Pronation /supination w/ 2# x 15         Green flexbar bilat supinations x 20, wrist flex/ ext x 15         Red flexbar pronations x 15. Proprioceptive Activities:                                    Manual Therapy:                                             Therapeutic Activities:                                      HEP: Exercises  Exercises  Seated Wrist Extension PROM - 2 x daily - 7 x weekly - 5 reps - 15 second hold  Seated Wrist Prayer Stretch - 2 x daily - 7 x weekly - 5 sets - 15 second hold  Seated Wrist Flexion with Overpressure - 2 x daily - 7 x weekly - 10 reps - 15 second hold  Seated Finger Composite Flexion Stretch - 2 x daily - 7 x weekly - 3 reps - 2 minuessccond hold  Wrist Pronation Stretch - 2 x daily - 7 x weekly - 5 sets - 15 second hold  Seated Wrist Supination Stretch - 2 x daily - 7 x weekly - 5 sets - 15 second hold    Added 12/10/2021: Emphasize 5 to 10 min holds on steady finger flexion stretch (MCP/IP to palm). OK to work on thumb-finger pinch using clothespin squeeze. 1/5/22: hand compression garments issued ([employer]), wear as much as tolerated, including at night. 2/7/22: start static hold full fist stretches x 5 min, 3x/day, and MCP flexion stretches w/ holds x 5 min. [employer] Portal  Treatment/Session Summary:    · Response to Treatment: Hook fist strength is firm 4/5 at this point. 85% of full hook fist AROM. Full fist AROM still limited by PIP flexion at 80%. · Communication/Consultation:  None today  · Equipment provided today:  None today   · Recommendations/Intent for next treatment session: Emphasis on a/p/rrom to regain flexor tendon/finger flexion RODRIGEZ and thumb opposition for normal /grasp, pronation/supination. Manual therapy for joint and tendon mobility.      Total Treatment Billable Duration:  43 minutes  PT Patient Time In/Time Out  Time In: 1101  Time Out: 4058 [facility],     Future Appointments   Date Time Provider Kamille Reis   3/11/2022 11:00 AM Kip Myles, PT [facility]   3/14/2022 11:00 AM Kip Myles, PT [facility]   3/16/2022 11:00 AM Kpi Myles, PT [facility]   3/18/2022 11:00 AM [facility]   3/21/2022 11:00 AM Kip Myles, [facility]   3/23/2022 11:00 AM Kip Myles, [facility]   3/25/2022 11:00 AM Kip Myles, PT [facility]   3/28/2022 11:00 AM Kip Myles, [facility]   3/30/2022 11:00 AM Wai Brown, PT [facility]

## 2022-03-10 ENCOUNTER — APPOINTMENT (OUTPATIENT)
Dept: PHYSICAL THERAPY | Age: 82
End: 2022-03-10
Payer: COMMERCIAL

## 2022-03-10 PROBLEM — R53.83 FATIGUE: Status: ACTIVE | Noted: 2022-03-10

## 2022-03-11 ENCOUNTER — HOSPITAL ENCOUNTER (OUTPATIENT)
Dept: PHYSICAL THERAPY | Age: 82
Discharge: HOME | End: 2022-03-11
Payer: COMMERCIAL

## 2022-03-11 PROCEDURE — 97110 THERAPEUTIC EXERCISES: CPT

## 2022-03-11 NOTE — PROGRESS NOTES
Grace Black  : 1940  Primary: [facility] [employer]  Secondary:  [address] @ [place] [address].  [phone]   [phone]      OUTPATIENT PHYSICAL THERAPY: Daily Treatment Note  3/11/2022   ICD-10: Treatment Diagnosis: Pain in right wrist (M25.531), Stiffness of right wrist, not elsewhere classified (M25.631), Pain in left wrist (M25.532), Stiffness of left wrist, not elsewhere classified (M25.632), Stiffness of right hand, not elsewhere classified (M25.641) and Stiffness of left hand, not elsewhere classified (M25.642)  MEDICAL/REFERRING DIAGNOSIS:  Status post bilateral distal radius fractures   TREATMENT PLAN:  Effective Dates: 3/7/22 to 22 (60 days). Frequency/Duration: 2 times a week for 60 Days  GOALS: (Goals have been discussed and agreed upon with patient.)  Short-Term Functional Goals: Time Frame: 4 weeks:   1. Independent performance of home exercise program (MET)  2. Reduce R hand edema to normal levels to allow normal finger to palm fist motions (Progressing)  3. Increase R wrist/hand ROM's to 75% or better of normal  In all planes of motion (MET)  Discharge Goals: Time Frame: 12 weeks  1. Pt to demonstrate at least 35#   Strength L/R for normal weight carrying (progressing)  2. Pt to demonstrate L/R wrist/ hand ROM's 85% or better of normal all planes (progressing)  3. Improve Quick-DASH scores to 15/55 or better to reflect return to normal ADL function (progressing)  Rehabilitation Potential For Stated Goals: Good  _______________________________________________________________________  Pre-treatment Symptoms/Complaints:   Pain: Initial: 3/10 Post Session:  No increase/10   Medications Last Reviewed:  3/11/2022  Updated Objective Findings:    Observation/Orthostatic Postural Assessment:    3/9/22: minimal R digit swelling.    Palpation:    Diffuse right digital swelling, mild dorsal R hand swelling (12/29/2021)  ROM:    Left : Pt demonstrates ability to perform full left hook, open and full fists. AROM's : wrist flexion = 40 deg, extension = 45 deg, pronation/supination 75% of full AROM, Ulnar deviation = 18 deg, radial deviation = 20 deg. Right: Passive Francisco J Javon digit ROM's: 2: passive RODRIGEZ now 75%. (12/22/2021), MCP flexion at index = 90 deg (1/22/22) , Thumb IP = full extension, 50% flexion, MCP = full extension, 50% flexion. CMC: 25% flexion. CMC radial abduction 40 deg. Wrist flexion P/AROM = 55 deg (1/5/2022), extension A/PROM = 40 deg/48 deg  Deg (1/5/22), Ulnar deviation = 15 deg PROM, radial deviation = to 15 deg. R forearm active/passive. pronation/supination  = ~ 60 deg to 75 deg (12/29/2021). Jezsyhvqwh-gf-hdqeohwj palmar crease measures: R hand: index and ring 4.5 cm (1/31/22)  Strength:   Left:  digits all 4-/5, Wrist flex/ext all 4/5  Right: digits all 3-/5, wrist flex/ext 4-/5   (1/19/22): 3rd run dynamometer: left = 32 #, right = 15#. 1/28/22: right = 15#    2/7/22: Fingernail to palm finger flexion measures R hand: Index = 3.5cm, Middle: 3.5 cm, Ring: 3.4 cm, 5th finger: 3.4 cm. Able to flex each MCP at digits 2-5 to 90 deg, and at each PIP to 90 deg, each w/stiff end feel. Able to lateral pinch with index to middle phalanx and tip, able to pinch to tip of middle digit with effort.  strength at 3rd rung = 17#. Quick-DASH score: 29/55  2/9/22: Pre treatment:   R hand ROM's: MCP flexion# 2 = 40 deg, #3 = 40 deg, # 4 = 40 deg, #5 = 30 deg. PIP flexion: #2 = 40 deg, # 3 = 50 deg, # 4 = 52 deg, # 5 = 50 deg,   DIP fleixon: #2 = 40 deg, # 3 = 30 deg, # 4 = 30 deg, # 5 = 28 deg. Fingernail to proximal palmar crease distances: #2 = 6cm, # 3 = 6 cm, # 4 = 5.5 cm, # 5 = 4.3 cm. Post treatment:   MCP flexion : #2 = 60 deg, # 3 = 55 deg  PIP fleixon: #2 = 88 deg, # 3 = 70 deg.    2/16/22:  Pre treatment:   R hand ROM's: MCP flexion# 2 = 54 deg, #3 = 44 deg, # 4 = 32 deg, #5 = 26 deg. PIP flexion: #2 = 40 deg, # 3 = 50 deg, # 4 = 55 deg, # 5 = 50 deg,   DIP fleixon: #2 = 32 deg, # 3 = 32 deg, # 4 = 42 deg, # 5 = 44 deg. Post treatment fingertipe to prox. Palmar crease distance: index = 4.6cm, middle = 4.6 cm. : #2: left = 16#, right = 26#, #4: left = 18#, right = 32#.   2/25/22: Wrist PROM's : flexion = 52 deg, extn = 38 deg, radial dev. = 12 deg, ulnar dev = 25 deg. Quick DASH = 32/55. 3/7/22: PIP flexion PROM: 2nd = 80 deg, 3rd = 82 deg, 4th = 82 deg, 5th = 85 deg.   3/11/22: Fingertip to prox. Palmar crease: Index = 3.5cm, Middle = 3.2cm     TREATMENT:   THERAPEUTIC ACTIVITY: ( see below for minutes): Therapeutic activities per grid below to improve mobility, strength, balance and coordination. Required minimal visual, verbal, manual and tactile cues to improve independence and safety with daily activities . THERAPEUTIC EXERCISE: (see below for minutes):  Exercises per grid below to improve mobility, strength, balance and coordination. Required minimal verbal and manual cues to promote proper body alignment, promote proper body posture and promote proper body mechanics. Progressed resistance, range, repetitions and complexity of movement as indicated. MANUAL THERAPY: (see below for minutes): Joint mobilization and Soft tissue mobilization was utilized and necessary because of the patient's restricted joint motion, painful spasm, loss of articular motion and restricted motion of soft tissue. MODALITIES: (see below for minutes):      to decrease pain    SELF CARE: (see below for minutes): Procedure(s) (per grid) utilized to improve and/or restore self-care/home management as related to dressing, bathing and grooming. Required minimal verbal cueing to facilitate activities of daily living skills and compensatory activities.      Date: 3/9/22 (visit 44)  3/11/22 (visit 45)       Modalities:                                    Therapeutic Exercise: 43 min 43 min        MCP static hold stretch : 2-3 x 3 min, 4-5 x 2 min, Hook fist stretch 2-3 and 4-5 as well as 2-5, 2nd digit tendon glide stretch x 10 reps Static hold stretches: MCP 2-3, 4-5 ,  hook fist stretch 2-5, full fist stretch 2-5, Full fist w/ tendon glides x 15 20 min        Wrist radial dev. Stretch x 2 min 2- 5 finger extension at PIP/DIP's : 2 x 2 min        Thumb stretching: MCP-PIP x 2 min Thumb stretch MCP x 2  Min, IP x 1 min         AROM: lumbrical table flexion x 15, hook fist AROM x 15 Theraputty brown resisted hook fist x 15         Hand gripper 15 x 5\"  Clothespin pinch/release 2 x 5 each index, middle.:         Thumb to finger coordiation: index, middle, ring and 5th x 5 sets Pronation w/ 2#  X 15, Radial dev. W/2# x 15        Large marble pinch gripping : thumb to index, middle, right; 5 x 5\" each  Wrist manl stretch into supination x 2 min        Theraputty brown resisted hook fist x 15          Wrist flexion curls w/ 3# x 20          Pronation /supination w/ 2# x 15         Green flexbar bilat supinations x 20, wrist flex/ ext x 15         Red flexbar pronations x 15. Proprioceptive Activities:                                    Manual Therapy:                                             Therapeutic Activities:                                      HEP: Exercises  Exercises  Seated Wrist Extension PROM - 2 x daily - 7 x weekly - 5 reps - 15 second hold  Seated Wrist Prayer Stretch - 2 x daily - 7 x weekly - 5 sets - 15 second hold  Seated Wrist Flexion with Overpressure - 2 x daily - 7 x weekly - 10 reps - 15 second hold  Seated Finger Composite Flexion Stretch - 2 x daily - 7 x weekly - 3 reps - 2 minuessccond hold  Wrist Pronation Stretch - 2 x daily - 7 x weekly - 5 sets - 15 second hold  Seated Wrist Supination Stretch - 2 x daily - 7 x weekly - 5 sets - 15 second hold    Added 12/10/2021: Emphasize 5 to 10 min holds on steady finger flexion stretch (MCP/IP to palm).  OK to work on thumb-finger pinch using clothespin squeeze. 1/5/22: hand compression garments issued ([employer]), wear as much as tolerated, including at night. 2/7/22: start static hold full fist stretches x 5 min, 3x/day, and MCP flexion stretches w/ holds x 5 min. [employer]  Treatment/Session Summary:    · Response to Treatment: Continuing to progress with full fist AROM. · Communication/Consultation:  None today  · Equipment provided today:  None today   · Recommendations/Intent for next treatment session: Emphasis on a/p/rrom to regain flexor tendon/finger flexion RODRIGEZ and thumb opposition for normal /grasp, pronation/supination. Manual therapy for joint and tendon mobility.      Total Treatment Billable Duration:  43 minutes     Rani Archer PT    Future Appointments   Date Time Provider Kamille Reis   3/11/2022 11:00 AM Aguilar Sleet, PT [facility]   3/14/2022 11:00 AM Aguilar Sleet, PT [facility]   3/16/2022 11:00 AM Aguilar Sleet, PT [facility]   3/18/2022 11:00 AM Aguilar Sleet, PT [facility]   3/21/2022 11:00 AM Aguilar Sleet, PT [facility]   3/23/2022 11:00 AM Aguilar Sleet, PT [facility]   3/25/2022 11:00 AM Aguilar Sleet, PT [facility]   3/28/2022 11:00 AM Aguilar Sleet, PT [facility]   3/30/2022 11:00 AM Edwardo Perry, PT [facility]

## 2022-03-14 ENCOUNTER — HOSPITAL ENCOUNTER (OUTPATIENT)
Dept: PHYSICAL THERAPY | Age: 82
Discharge: HOME | End: 2022-03-14
Payer: COMMERCIAL

## 2022-03-14 PROCEDURE — 97110 THERAPEUTIC EXERCISES: CPT

## 2022-03-14 NOTE — PROGRESS NOTES
Cindy Black  : 1940  Primary: [facility] [place]  Secondary:  [address]  [phone]   [phone]      OUTPATIENT PHYSICAL THERAPY: Daily Treatment Note  3/14/2022   ICD-10: Treatment Diagnosis: Pain in right wrist (M25.531), Stiffness of right wrist, not elsewhere classified (M25.631), Pain in left wrist (M25.532), Stiffness of left wrist, not elsewhere classified (M25.632), Stiffness of right hand, not elsewhere classified (M25.641) and Stiffness of left hand, not elsewhere classified (M25.642)  MEDICAL/REFERRING DIAGNOSIS:  Status post bilateral distal radius fractures   TREATMENT PLAN:  Effective Dates: 3/7/22 to 22 (60 days). Frequency/Duration: 2 times a week for 60 Days  GOALS: (Goals have been discussed and agreed upon with patient.)  Short-Term Functional Goals: Time Frame: 4 weeks:   1. Independent performance of home exercise program (MET)  2. Reduce R hand edema to normal levels to allow normal finger to palm fist motions (Progressing)  3. Increase R wrist/hand ROM's to 75% or better of normal  In all planes of motion (MET)  Discharge Goals: Time Frame: 12 weeks  1. Pt to demonstrate at least 35#   Strength L/R for normal weight carrying (progressing)  2. Pt to demonstrate L/R wrist/ hand ROM's 85% or better of normal all planes (progressing)  3. Improve Quick-DASH scores to 15/55 or better to reflect return to normal ADL function (progressing)  Rehabilitation Potential For Stated Goals: Good  _______________________________________________________________________  Pre-treatment Symptoms/Complaints:   Pain: Initial: 3/10 Post Session:  No increase/10   Medications Last Reviewed:  3/14/2022  Updated Objective Findings:    Observation/Orthostatic Postural Assessment:    3/9/22: minimal R digit swelling.    Palpation:    Diffuse right digital swelling, mild dorsal R hand swelling (12/29/2021)  ROM:    Left : Pt demonstrates ability to perform full left hook, open and full fists. AROM's : wrist flexion = 40 deg, extension = 45 deg, pronation/supination 75% of full AROM, Ulnar deviation = 18 deg, radial deviation = 20 deg. Right: Passive Ann Soulier digit ROM's: 2: passive RODRIGEZ now 75%. (12/22/2021), MCP flexion at index = 90 deg (1/22/22) , Thumb IP = full extension, 50% flexion, MCP = full extension, 50% flexion. CMC: 25% flexion. CMC radial abduction 40 deg. Wrist flexion P/AROM = 55 deg (1/5/2022), extension A/PROM = 40 deg/48 deg  Deg (1/5/22), Ulnar deviation = 15 deg PROM, radial deviation = to 15 deg. R forearm active/passive. pronation/supination  = ~ 60 deg to 75 deg (12/29/2021). Suicbhcbbr-ah-ztxivkhj palmar crease measures: R hand: index and ring 4.5 cm (1/31/22)  Strength:   Left:  digits all 4-/5, Wrist flex/ext all 4/5  Right: digits all 3-/5, wrist flex/ext 4-/5   (1/19/22): 3rd run dynamometer: left = 32 #, right = 15#. 1/28/22: right = 15#    2/7/22: Fingernail to palm finger flexion measures R hand: Index = 3.5cm, Middle: 3.5 cm, Ring: 3.4 cm, 5th finger: 3.4 cm. Able to flex each MCP at digits 2-5 to 90 deg, and at each PIP to 90 deg, each w/stiff end feel. Able to lateral pinch with index to middle phalanx and tip, able to pinch to tip of middle digit with effort.  strength at 3rd rung = 17#. Quick-DASH score: 29/55  2/9/22: Pre treatment:   R hand ROM's: MCP flexion# 2 = 40 deg, #3 = 40 deg, # 4 = 40 deg, #5 = 30 deg. PIP flexion: #2 = 40 deg, # 3 = 50 deg, # 4 = 52 deg, # 5 = 50 deg,   DIP fleixon: #2 = 40 deg, # 3 = 30 deg, # 4 = 30 deg, # 5 = 28 deg. Fingernail to proximal palmar crease distances: #2 = 6cm, # 3 = 6 cm, # 4 = 5.5 cm, # 5 = 4.3 cm. Post treatment:   MCP flexion : #2 = 60 deg, # 3 = 55 deg  PIP fleixon: #2 = 88 deg, # 3 = 70 deg.    2/16/22:  Pre treatment:   R hand ROM's: MCP flexion# 2 = 54 deg, #3 = 44 deg, # 4 = 32 deg, #5 = 26 deg. PIP flexion: #2 = 40 deg, # 3 = 50 deg, # 4 = 55 deg, # 5 = 50 deg,   DIP fleixon: #2 = 32 deg, # 3 = 32 deg, # 4 = 42 deg, # 5 = 44 deg. Post treatment fingertipe to prox. Palmar crease distance: index = 4.6cm, middle = 4.6 cm. : #2: left = 16#, right = 26#, #4: left = 18#, right = 32#.   2/25/22: Wrist PROM's : flexion = 52 deg, extn = 38 deg, radial dev. = 12 deg, ulnar dev = 25 deg. Quick DASH = 32/55. 3/7/22: PIP flexion PROM: 2nd = 80 deg, 3rd = 82 deg, 4th = 82 deg, 5th = 85 deg.   3/11/22: Fingertip to prox. Palmar crease: Index = 3.5cm, Middle = 3.2cm     TREATMENT:   THERAPEUTIC ACTIVITY: ( see below for minutes): Therapeutic activities per grid below to improve mobility, strength, balance and coordination. Required minimal visual, verbal, manual and tactile cues to improve independence and safety with daily activities . THERAPEUTIC EXERCISE: (see below for minutes):  Exercises per grid below to improve mobility, strength, balance and coordination. Required minimal verbal and manual cues to promote proper body alignment, promote proper body posture and promote proper body mechanics. Progressed resistance, range, repetitions and complexity of movement as indicated. MANUAL THERAPY: (see below for minutes): Joint mobilization and Soft tissue mobilization was utilized and necessary because of the patient's restricted joint motion, painful spasm, loss of articular motion and restricted motion of soft tissue. MODALITIES: (see below for minutes):      to decrease pain    SELF CARE: (see below for minutes): Procedure(s) (per grid) utilized to improve and/or restore self-care/home management as related to dressing, bathing and grooming. Required minimal verbal cueing to facilitate activities of daily living skills and compensatory activities.      Date: 3/9/22 (visit 44)  3/11/22 (visit 39) 3/14/22 (visit 46)      Modalities:                                    Therapeutic Exercise: 43 min 43 min 43 min       MCP static hold stretch : 2-3 x 3 min, 4-5 x 2 min, Hook fist stretch 2-3 and 4-5 as well as 2-5, 2nd digit tendon glide stretch x 10 reps Static hold stretches: MCP 2-3, 4-5 ,  hook fist stretch 2-5, full fist stretch 2-5, Full fist w/ tendon glides x 15 20 min Static hold stretches: MCP 2-3 and 4-5, hook fist. Full fist w/ flexor tendon glides. 20 min       Wrist radial dev. Stretch x 2 min 2- 5 finger extension at PIP/DIP's : 2 x 2 min Static hold PIP/DIP extn stretches 2-5 individually. Thumb stretching: MCP-PIP x 2 min Thumb stretch MCP x 2  Min, IP x 1 min  Thumb stretch: CMC flex/opposition and abduction, MCP flexion : 3 min       AROM: lumbrical table flexion x 15, hook fist AROM x 15 Theraputty brown resisted hook fist x 15  Finger flex gripping w/4-5\" holds: 2 x 10 into green ball, 10 into spring gripper       Hand gripper 15 x 5\"  Clothespin pinch/release 2 x 5 each index, middle.:  Manl stretching: wrist extn. , supination x 4 min       Thumb to finger coordiation: index, middle, ring and 5th x 5 sets Pronation w/ 2#  X 15, Radial dev. W/2# x 15 Pronation w/ 2#  X 15, Radial dev. W/2#, 2 x 15       Large marble pinch gripping : thumb to index, middle, right; 5 x 5\" each  Wrist manl stretch into supination x 2 min        Theraputty brown resisted hook fist x 15          Wrist flexion curls w/ 3# x 20          Pronation /supination w/ 2# x 15         Green flexbar bilat supinations x 20, wrist flex/ ext x 15         Red flexbar pronations x 15.          Proprioceptive Activities:                                    Manual Therapy:                                             Therapeutic Activities:                                      HEP: Exercises  Exercises  Seated Wrist Extension PROM - 2 x daily - 7 x weekly - 5 reps - 15 second hold  Seated Wrist Prayer Stretch - 2 x daily - 7 x weekly - 5 sets - 15 second hold  Seated Wrist Flexion with Overpressure - 2 x daily - 7 x weekly - 10 reps - 15 second hold  Seated Finger Composite Flexion Stretch - 2 x daily - 7 x weekly - 3 reps - 2 minuessccond hold  Wrist Pronation Stretch - 2 x daily - 7 x weekly - 5 sets - 15 second hold  Seated Wrist Supination Stretch - 2 x daily - 7 x weekly - 5 sets - 15 second hold    Added 12/10/2021: Emphasize 5 to 10 min holds on steady finger flexion stretch (MCP/IP to palm). OK to work on thumb-finger pinch using clothespin squeeze. 1/5/22: hand compression garments issued ([employer]), wear as much as tolerated, including at night. 2/7/22: start static hold full fist stretches x 5 min, 3x/day, and MCP flexion stretches w/ holds x 5 min. [employer] Portal  Treatment/Session Summary:    · Response to Treatment: Slow but steady gains on full flexion: Mild improvement in  (4th run on dymometer = 20#). · Communication/Consultation:  None today  · Equipment provided today:  None today   · Recommendations/Intent for next treatment session: Emphasis on a/p/rrom to regain flexor tendon/finger flexion RODRIGEZ and thumb opposition for normal /grasp, pronation/supination. Manual therapy for joint and tendon mobility.      Total Treatment Billable Duration:  43 minutes  PT Patient Time In/Time Out  Time In: 1100  Time Out: 250 [facility], PT    Future Appointments   Date Time Provider Kamille Reis   3/16/2022 11:00 AM Regina Burrell, PT [facility]   3/18/2022 11:00 AM [facility]   3/21/2022 11:00 AM [facility] Bur[facility], [facility]   3/23/2022 11:00 AM Regina Burrell, [facility]   3/25/2022 11:00 AM Regina Burrell, PT [facility]   3/28/2022 11:00 AM Regina Burrell, [facility]   3/30/2022 11:00 AM Iqra Stokes, PT [facility]

## 2022-03-16 ENCOUNTER — HOSPITAL ENCOUNTER (OUTPATIENT)
Dept: PHYSICAL THERAPY | Age: 82
Discharge: HOME | End: 2022-03-16
Payer: COMMERCIAL

## 2022-03-16 PROCEDURE — 97110 THERAPEUTIC EXERCISES: CPT

## 2022-03-16 NOTE — PROGRESS NOTES
Judah Black  : 1940  Primary: [facility]  Secondary:  [address]  [phone]   [phone]      OUTPATIENT PHYSICAL THERAPY: Daily Treatment Note  3/16/2022   ICD-10: Treatment Diagnosis: Pain in right wrist (M25.531), Stiffness of right wrist, not elsewhere classified (M25.631), Pain in left wrist (M25.532), Stiffness of left wrist, not elsewhere classified (M25.632), Stiffness of right hand, not elsewhere classified (M25.641) and Stiffness of left hand, not elsewhere classified (M25.642)  MEDICAL/REFERRING DIAGNOSIS:  Status post bilateral distal radius fractures   TREATMENT PLAN:  Effective Dates: 3/7/22 to 22 (60 days). Frequency/Duration: 2 times a week for 60 Days  GOALS: (Goals have been discussed and agreed upon with patient.)  Short-Term Functional Goals: Time Frame: 4 weeks:   1. Independent performance of home exercise program (MET)  2. Reduce R hand edema to normal levels to allow normal finger to palm fist motions (Progressing)  3. Increase R wrist/hand ROM's to 75% or better of normal  In all planes of motion (MET)  Discharge Goals: Time Frame: 12 weeks  1. Pt to demonstrate at least 35#   Strength L/R for normal weight carrying (progressing)  2. Pt to demonstrate L/R wrist/ hand ROM's 85% or better of normal all planes (progressing)  3. Improve Quick-DASH scores to 15/55 or better to reflect return to normal ADL function (progressing)  Rehabilitation Potential For Stated Goals: Good  _______________________________________________________________________  Pre-treatment Symptoms/Complaints: Hand feels tight today. Index finger feels like it's flexing better than the others, middle and ring finger tighter than the others.    Pain: Initial: 3/10 Post Session:  No increase/10   Medications Last Reviewed:  3/16/2022  Updated Objective Findings: Observation/Orthostatic Postural Assessment:    3/9/22: minimal R digit swelling. Palpation:    Diffuse right digital swelling, mild dorsal R hand swelling (12/29/2021)  ROM:    Left : Pt demonstrates ability to perform full left hook, open and full fists. AROM's : wrist flexion = 40 deg, extension = 45 deg, pronation/supination 75% of full AROM, Ulnar deviation = 18 deg, radial deviation = 20 deg. Right: Passive Marilyn Maryanne digit ROM's: 2: passive RODRIGEZ now 75%. (12/22/2021), MCP flexion at index = 90 deg (1/22/22) , Thumb IP = full extension, 50% flexion, MCP = full extension, 50% flexion. CMC: 25% flexion. CMC radial abduction 40 deg. Wrist flexion P/AROM = 55 deg (1/5/2022), extension A/PROM = 40 deg/48 deg  Deg (1/5/22), Ulnar deviation = 15 deg PROM, radial deviation = to 15 deg. R forearm active/passive. pronation/supination  = ~ 60 deg to 75 deg (12/29/2021). Iybusvqjpc-vy-evtgwahh palmar crease measures: R hand: index and ring 4.5 cm (1/31/22)  Strength:   Left:  digits all 4-/5, Wrist flex/ext all 4/5  Right: digits all 3-/5, wrist flex/ext 4-/5   (1/19/22): 3rd run dynamometer: left = 32 #, right = 15#. 1/28/22: right = 15#    2/7/22: Fingernail to palm finger flexion measures R hand: Index = 3.5cm, Middle: 3.5 cm, Ring: 3.4 cm, 5th finger: 3.4 cm. Able to flex each MCP at digits 2-5 to 90 deg, and at each PIP to 90 deg, each w/stiff end feel. Able to lateral pinch with index to middle phalanx and tip, able to pinch to tip of middle digit with effort.  strength at 3rd rung = 17#. Quick-DASH score: 29/55  2/9/22: Pre treatment:   R hand ROM's: MCP flexion# 2 = 40 deg, #3 = 40 deg, # 4 = 40 deg, #5 = 30 deg. PIP flexion: #2 = 40 deg, # 3 = 50 deg, # 4 = 52 deg, # 5 = 50 deg,   DIP fleixon: #2 = 40 deg, # 3 = 30 deg, # 4 = 30 deg, # 5 = 28 deg. Fingernail to proximal palmar crease distances: #2 = 6cm, # 3 = 6 cm, # 4 = 5.5 cm, # 5 = 4.3 cm.    Post treatment:   MCP flexion : #2 = 60 deg, # 3 = 55 deg  PIP fleixon: #2 = 88 deg, # 3 = 70 deg. 2/16/22:  Pre treatment:   R hand ROM's: MCP flexion# 2 = 54 deg, #3 = 44 deg, # 4 = 32 deg, #5 = 26 deg. PIP flexion: #2 = 40 deg, # 3 = 50 deg, # 4 = 55 deg, # 5 = 50 deg,   DIP fleixon: #2 = 32 deg, # 3 = 32 deg, # 4 = 42 deg, # 5 = 44 deg. Post treatment fingertipe to prox. Palmar crease distance: index = 4.6cm, middle = 4.6 cm. : #2: left = 16#, right = 26#, #4: left = 18#, right = 32#.   2/25/22: Wrist PROM's : flexion = 52 deg, extn = 38 deg, radial dev. = 12 deg, ulnar dev = 25 deg. Quick DASH = 32/55. 3/7/22: PIP flexion PROM: 2nd = 80 deg, 3rd = 82 deg, 4th = 82 deg, 5th = 85 deg.   3/11/22: Fingertip to prox. Palmar crease: Index = 3.5cm, Middle = 3.2cm     TREATMENT:   THERAPEUTIC ACTIVITY: ( see below for minutes): Therapeutic activities per grid below to improve mobility, strength, balance and coordination. Required minimal visual, verbal, manual and tactile cues to improve independence and safety with daily activities . THERAPEUTIC EXERCISE: (see below for minutes):  Exercises per grid below to improve mobility, strength, balance and coordination. Required minimal verbal and manual cues to promote proper body alignment, promote proper body posture and promote proper body mechanics. Progressed resistance, range, repetitions and complexity of movement as indicated. MANUAL THERAPY: (see below for minutes): Joint mobilization and Soft tissue mobilization was utilized and necessary because of the patient's restricted joint motion, painful spasm, loss of articular motion and restricted motion of soft tissue. MODALITIES: (see below for minutes):      to decrease pain    SELF CARE: (see below for minutes): Procedure(s) (per grid) utilized to improve and/or restore self-care/home management as related to dressing, bathing and grooming.  Required minimal verbal cueing to facilitate activities of daily living skills and compensatory activities. Date: 3/9/22 (visit 40)  3/11/22 (visit 39) 3/14/22 (visit 55) 3/16/22 (visit 52)     Modalities:                                    Therapeutic Exercise: 43 min 43 min 43 min 39 min      MCP static hold stretch : 2-3 x 3 min, 4-5 x 2 min, Hook fist stretch 2-3 and 4-5 as well as 2-5, 2nd digit tendon glide stretch x 10 reps Static hold stretches: MCP 2-3, 4-5 ,  hook fist stretch 2-5, full fist stretch 2-5, Full fist w/ tendon glides x 15 20 min Static hold stretches: MCP 2-3 and 4-5, hook fist. Full fist w/ flexor tendon glides. 20 min Static hold stretching: MCP, PIP's with traction in flexion (2-5), individual finger and combined hook fist and full fist stretches. Wrist radial dev. Stretch x 2 min 2- 5 finger extension at PIP/DIP's : 2 x 2 min Static hold PIP/DIP extn stretches 2-5 individually. Flexor tendon glides: lumbrical tables, hook fist and full fist 15 each      Thumb stretching: MCP-PIP x 2 min Thumb stretch MCP x 2  Min, IP x 1 min  Thumb stretch: CMC flex/opposition and abduction, MCP flexion : 3 min Thumb stretch: CMC flex/opposition and abduction, MCP flexion : 3 min      AROM: lumbrical table flexion x 15, hook fist AROM x 15 Theraputty brown resisted hook fist x 15  Finger flex gripping w/4-5\" holds: 2 x 10 into green ball, 10 into spring gripper Putty squeezes: Key pinches and twists with index/middle x 15, individual pinch to tips index and middle x 10 each (blue putty)      Hand gripper 15 x 5\"  Clothespin pinch/release 2 x 5 each index, middle.:  Manl stretching: wrist extn. , supination x 4 min Open fist resisted; blue putty x 15       Thumb to finger coordiation: index, middle, ring and 5th x 5 sets Pronation w/ 2#  X 15, Radial dev. W/2# x 15 Pronation w/ 2#  X 15, Radial dev.  W/2#, 2 x 15       Large marble pinch gripping : thumb to index, middle, right; 5 x 5\" each  Wrist manl stretch into supination x 2 min        Theraputty brown resisted hook fist x 15 Wrist flexion curls w/ 3# x 20          Pronation /supination w/ 2# x 15         Green flexbar bilat supinations x 20, wrist flex/ ext x 15         Red flexbar pronations x 15. Proprioceptive Activities:                                    Manual Therapy:                                             Therapeutic Activities:                                      HEP: Exercises  Exercises  Seated Wrist Extension PROM - 2 x daily - 7 x weekly - 5 reps - 15 second hold  Seated Wrist Prayer Stretch - 2 x daily - 7 x weekly - 5 sets - 15 second hold  Seated Wrist Flexion with Overpressure - 2 x daily - 7 x weekly - 10 reps - 15 second hold  Seated Finger Composite Flexion Stretch - 2 x daily - 7 x weekly - 3 reps - 2 minuessccond hold  Wrist Pronation Stretch - 2 x daily - 7 x weekly - 5 sets - 15 second hold  Seated Wrist Supination Stretch - 2 x daily - 7 x weekly - 5 sets - 15 second hold    Added 12/10/2021: Emphasize 5 to 10 min holds on steady finger flexion stretch (MCP/IP to palm). OK to work on thumb-finger pinch using clothespin squeeze. 1/5/22: hand compression garments issued ([employer]), wear as much as tolerated, including at night. 2/7/22: start static hold full fist stretches x 5 min, 3x/day, and MCP flexion stretches w/ holds x 5 min. [employer] Portal  Treatment/Session Summary:    · Response to Treatment: Able to flex  Ring PIP past 90 deg for first time ( ~ 100 deg)  · Communication/Consultation:  None today  · Equipment provided today:  None today   · Recommendations/Intent for next treatment session: Emphasis on a/p/rrom to regain flexor tendon/finger flexion RODRIGEZ and thumb opposition for normal /grasp, pronation/supination. Manual therapy for joint and tendon mobility.      Total Treatment Billable Duration:  39 minutes  PT Patient Time In/Time Out  Time In: 6457  Time Out: 250 [facility], PT    Future Appointments   Date Time Provider Kamille Reis   3/18/2022 11:00 AM Matilda Fox, PT [facility]   3/21/2022 11:00 AM Matilda Fox, PT [facility] [place]   3/23/2022 11:00 AM Matilda Fox, PT [facility]   3/25/2022 11:00 AM Matilda Fox, PT [facility]   3/28/2022 11:00 AM Matilda Fox, PT [facility]   3/30/2022 11:00 AM Tawny Damico, PT [facility] [place]

## 2022-03-18 ENCOUNTER — HOSPITAL ENCOUNTER (OUTPATIENT)
Dept: PHYSICAL THERAPY | Age: 82
Discharge: HOME | End: 2022-03-18
Payer: COMMERCIAL

## 2022-03-18 PROBLEM — R53.83 FATIGUE: Status: ACTIVE | Noted: 2022-03-10

## 2022-03-18 PROCEDURE — 97110 THERAPEUTIC EXERCISES: CPT

## 2022-03-18 NOTE — PROGRESS NOTES
Alize Black  : 1940  Primary: [email or web address]  Secondary:  [address] @ [place] [address].  [phone]   [phone]      OUTPATIENT PHYSICAL THERAPY: Progress Report/ Daily Treatment Note  3/18/2022   ICD-10: Treatment Diagnosis: Pain in right wrist (M25.531), Stiffness of right wrist, not elsewhere classified (M25.631), Pain in left wrist (M25.532), Stiffness of left wrist, not elsewhere classified (M25.632), Stiffness of right hand, not elsewhere classified (M25.641) and Stiffness of left hand, not elsewhere classified (M25.642)  MEDICAL/REFERRING DIAGNOSIS:  Status post bilateral distal radius fractures   TREATMENT PLAN:  Effective Dates: 3/7/22 to 22 (60 days). Frequency/Duration: 2 times a week for 60 Days  GOALS: (Goals have been discussed and agreed upon with patient.)  Short-Term Functional Goals: Time Frame: 4 weeks:   1. Independent performance of home exercise program (MET)  2. Reduce R hand edema to normal levels to allow normal finger to palm fist motions (MET)  3. Increase R wrist/hand ROM's to 75% or better of normal  In all planes of motion (MET)  Discharge Goals: Time Frame: 12 weeks  1. Pt to demonstrate at least 35#   Strength L/R for normal weight carrying (progressing)  2. Pt to demonstrate L/R wrist/ hand ROM's 85% or better of normal all planes (progressing)  3. Improve Quick-DASH scores to 15/55 or better to reflect return to normal ADL function (progressing)  Rehabilitation Potential For Stated Goals: Good  _______________________________________________________________________  Pre-treatment Symptoms/Complaints: 2nd finger functioning better , continues to struggle with 3rd finger flexion.    Pain: Initial: 3/10 Post Session:  No increase/10   Medications Last Reviewed:  3/18/2022  Updated Objective Findings:    Observation/Orthostatic Postural Assessment:    3/9/22: minimal R digit swelling. Palpation:    Diffuse right digital swelling, mild dorsal R hand swelling (12/29/2021)  ROM:    Left : Pt demonstrates ability to perform full left hook, open and full fists. AROM's : wrist flexion = 40 deg, extension = 45 deg, pronation/supination 75% of full AROM, Ulnar deviation = 18 deg, radial deviation = 20 deg. Right: Passive Suezanne Basque digit ROM's: 2: passive RODRIGEZ now 75%. (12/22/2021), MCP flexion at index = 90 deg (1/22/22) , Thumb IP = full extension, 50% flexion, MCP = full extension, 50% flexion. CMC: 25% flexion. CMC radial abduction 40 deg. Wrist flexion P/AROM = 55 deg (1/5/2022), extension A/PROM = 40 deg/48 deg  Deg (1/5/22), Ulnar deviation = 15 deg PROM, radial deviation = to 15 deg. R forearm active/passive. pronation/supination  = ~ 60 deg to 75 deg (12/29/2021). Agvdsfpjuu-ei-rtakrkzy palmar crease measures: R hand: index and ring 4.5 cm (1/31/22)  Strength:   Left:  digits all 4-/5, Wrist flex/ext all 4/5  Right: digits all 3-/5, wrist flex/ext 4-/5   (1/19/22): 3rd run dynamometer: left = 32 #, right = 15#. 1/28/22: right = 15#    2/7/22: Fingernail to palm finger flexion measures R hand: Index = 3.5cm, Middle: 3.5 cm, Ring: 3.4 cm, 5th finger: 3.4 cm. Able to flex each MCP at digits 2-5 to 90 deg, and at each PIP to 90 deg, each w/stiff end feel. Able to lateral pinch with index to middle phalanx and tip, able to pinch to tip of middle digit with effort.  strength at 3rd rung = 17#. Quick-DASH score: 29/55  2/9/22: Pre treatment:   R hand ROM's: MCP flexion# 2 = 40 deg, #3 = 40 deg, # 4 = 40 deg, #5 = 30 deg. PIP flexion: #2 = 40 deg, # 3 = 50 deg, # 4 = 52 deg, # 5 = 50 deg,   DIP fleixon: #2 = 40 deg, # 3 = 30 deg, # 4 = 30 deg, # 5 = 28 deg. Fingernail to proximal palmar crease distances: #2 = 6cm, # 3 = 6 cm, # 4 = 5.5 cm, # 5 = 4.3 cm. Post treatment:   MCP flexion : #2 = 60 deg, # 3 = 55 deg  PIP fleixon: #2 = 88 deg, # 3 = 70 deg.    2/16/22:  Pre treatment:   R hand ROM's: MCP flexion# 2 = 54 deg, #3 = 44 deg, # 4 = 32 deg, #5 = 26 deg. PIP flexion: #2 = 40 deg, # 3 = 50 deg, # 4 = 55 deg, # 5 = 50 deg,   DIP fleixon: #2 = 32 deg, # 3 = 32 deg, # 4 = 42 deg, # 5 = 44 deg. Post treatment fingertipe to prox. Palmar crease distance: index = 4.6cm, middle = 4.6 cm. : #2: left = 16#, right = 26#, #4: left = 18#, right = 32#.   2/25/22: Wrist PROM's : flexion = 52 deg, extn = 38 deg, radial dev. = 12 deg, ulnar dev = 25 deg. Quick DASH = 32/55. 3/7/22: PIP flexion PROM: 2nd = 80 deg, 3rd = 82 deg, 4th = 82 deg, 5th = 85 deg.   3/11/22: Fingertip to prox. Palmar crease: Index = 3.5cm, Middle = 3.2cm    3/18/22: MCP flexion: #2: 70 deg A, 85 deg P, #3: 60 deg A, 80 deg P, # 4: 77 deg P  PIP flexion: #2: 65 deg A, 75 deg P, #3: 75 deg A, 88 deg P, #4: 80 deg P  Full fist fingertip to palm distance: #2: 4.7 cm A, 4.0cm P, #3 5.0cm A, 3.0 cm P, # 5: 4.9 cm A, 3.3 cm P. Wrist AROM/PROM R: flexion : 40 deg A, 43 deg. P, Extension: 40 deg A, 45 deg P. Quick Dash: 25/55     TREATMENT:   THERAPEUTIC ACTIVITY: ( see below for minutes): Therapeutic activities per grid below to improve mobility, strength, balance and coordination. Required minimal visual, verbal, manual and tactile cues to improve independence and safety with daily activities . THERAPEUTIC EXERCISE: (see below for minutes):  Exercises per grid below to improve mobility, strength, balance and coordination. Required minimal verbal and manual cues to promote proper body alignment, promote proper body posture and promote proper body mechanics. Progressed resistance, range, repetitions and complexity of movement as indicated. MANUAL THERAPY: (see below for minutes): Joint mobilization and Soft tissue mobilization was utilized and necessary because of the patient's restricted joint motion, painful spasm, loss of articular motion and restricted motion of soft tissue.    MODALITIES: (see below for minutes):      to decrease pain    SELF CARE: (see below for minutes): Procedure(s) (per grid) utilized to improve and/or restore self-care/home management as related to dressing, bathing and grooming. Required minimal verbal cueing to facilitate activities of daily living skills and compensatory activities. Date: 3/9/22 (visit 40)  3/11/22 (visit 39) 3/14/22 (visit 55) 3/16/22 (visit 52) 3/18/22 (visit 48)    Modalities:                                    Therapeutic Exercise: 43 min 43 min 43 min 39 min 43 min     MCP static hold stretch : 2-3 x 3 min, 4-5 x 2 min, Hook fist stretch 2-3 and 4-5 as well as 2-5, 2nd digit tendon glide stretch x 10 reps Static hold stretches: MCP 2-3, 4-5 ,  hook fist stretch 2-5, full fist stretch 2-5, Full fist w/ tendon glides x 15 20 min Static hold stretches: MCP 2-3 and 4-5, hook fist. Full fist w/ flexor tendon glides. 20 min Static hold stretching: MCP, PIP's with traction in flexion (2-5), individual finger and combined hook fist and full fist stretches. Static hold stretching: finger flexion: MCP's 2-3-4-5 individual and combined with hook and full fist, PIP hook and full fist,  DIP hook and full fist x 30 min     Wrist radial dev. Stretch x 2 min 2- 5 finger extension at PIP/DIP's : 2 x 2 min Static hold PIP/DIP extn stretches 2-5 individually. Flexor tendon glides: lumbrical tables, hook fist and full fist 15 each Fingers 2-3 passive stretch at PIP's x 2 min each.       Thumb stretching: MCP-PIP x 2 min Thumb stretch MCP x 2  Min, IP x 1 min  Thumb stretch: CMC flex/opposition and abduction, MCP flexion : 3 min Thumb stretch: CMC flex/opposition and abduction, MCP flexion : 3 min Resisted full fist (manl) 2 x 15     AROM: lumbrical table flexion x 15, hook fist AROM x 15 Theraputty brown resisted hook fist x 15  Finger flex gripping w/4-5\" holds: 2 x 10 into green ball, 10 into spring gripper Putty squeezes: Key pinches and twists with index/middle x 15, individual pinch to tips index and middle x 10 each (blue putty) Resisted wrist extn and wrist flexion (manl resist) 2 x 15 each R.      Hand gripper 15 x 5\"  Clothespin pinch/release 2 x 5 each index, middle.:  Manl stretching: wrist extn. , supination x 4 min Open fist resisted; blue putty x 15       Thumb to finger coordiation: index, middle, ring and 5th x 5 sets Pronation w/ 2#  X 15, Radial dev. W/2# x 15 Pronation w/ 2#  X 15, Radial dev. W/2#, 2 x 15       Large marble pinch gripping : thumb to index, middle, right; 5 x 5\" each  Wrist manl stretch into supination x 2 min        Theraputty brown resisted hook fist x 15          Wrist flexion curls w/ 3# x 20          Pronation /supination w/ 2# x 15         Green flexbar bilat supinations x 20, wrist flex/ ext x 15         Red flexbar pronations x 15. Proprioceptive Activities:                                    Manual Therapy:                                             Therapeutic Activities:                                      HEP: Exercises  Exercises  Seated Wrist Extension PROM - 2 x daily - 7 x weekly - 5 reps - 15 second hold  Seated Wrist Prayer Stretch - 2 x daily - 7 x weekly - 5 sets - 15 second hold  Seated Wrist Flexion with Overpressure - 2 x daily - 7 x weekly - 10 reps - 15 second hold  Seated Finger Composite Flexion Stretch - 2 x daily - 7 x weekly - 3 reps - 2 minuessccond hold  Wrist Pronation Stretch - 2 x daily - 7 x weekly - 5 sets - 15 second hold  Seated Wrist Supination Stretch - 2 x daily - 7 x weekly - 5 sets - 15 second hold    Added 12/10/2021: Emphasize 5 to 10 min holds on steady finger flexion stretch (MCP/IP to palm). OK to work on thumb-finger pinch using clothespin squeeze. 1/5/22: hand compression garments issued (incrediwear), wear as much as tolerated, including at night. 2/7/22: start static hold full fist stretches x 5 min, 3x/day, and MCP flexion stretches w/ holds x 5 min.      [facility] Portal  Treatment/Session Summary:    · Response to Treatment: All STG's met now. Finger flexion continues its slow but steady improvement. Therapy continues to be medically necessary because pt is not yet able to create a normal fist and her  strength hasn't gotten past 20# , which impairs her ability to drive, carry heavy objects, to handwrite or perform fine manipulation with her right fingers . Pt is right hand dominant. Quick - DASH has improved to 25 /55, 7 point improvement since last survey on 2/25/22  · Communication/Consultation:  None today  · Equipment provided today:  None today   · Recommendations/Intent for next treatment session: Emphasis on a/p/rrom to regain flexor tendon/finger flexion RODRIGEZ and thumb opposition for normal /grasp, pronation/supination. Manual therapy for joint and tendon mobility.      Total Treatment Billable Duration:  43 minutes  PT Patient Time In/Time Out  Time In: 1100  Time Out: HARLEY Saenz    Future Appointments   Date Time Provider Kamille Reis   3/21/2022 11:00 AM Antonia Boozer, PT [facility]   3/23/2022 11:00 AM [facility]   3/25/2022 11:00 AM Antonia Boozer, PT [facility]   3/28/2022 11:00 AM Antonia Boozer, PT [facility]   3/30/2022 11:00 AM [facility]oozer, [facility]   4/4/2022 11:00 AM Antonia Boozer, PT [facility]   4/6/2022 11:00 AM Antonia Boozer, PT [facility]   4/14/2022 11:00 AM Antonia Boozer, PT [facility]   4/18/2022 11:00 AM Antonia Boozer, PT [facility]   4/20/2022 11:00 AM Antonia Boozer, PT [facility]   4/25/2022 11:00 AM Antonia Boozer, PT [facility]   4/27/2022 11:00 AM Warren Barragan, PT [facility]

## 2022-03-19 PROBLEM — F41.1 GENERALIZED ANXIETY DISORDER: Status: ACTIVE | Noted: 2021-11-08

## 2022-03-19 PROBLEM — I15.8 OTHER SECONDARY HYPERTENSION: Status: ACTIVE | Noted: 2021-11-08

## 2022-03-20 PROBLEM — Z91.81 AT HIGH RISK FOR FALLS: Status: ACTIVE | Noted: 2021-11-08

## 2022-03-20 PROBLEM — E66.01 SEVERE OBESITY (BMI 35.0-35.9 WITH COMORBIDITY) (HCC): Status: ACTIVE | Noted: 2021-12-09

## 2022-03-21 ENCOUNTER — HOSPITAL ENCOUNTER (OUTPATIENT)
Dept: PHYSICAL THERAPY | Age: 82
Discharge: HOME | End: 2022-03-21
Payer: COMMERCIAL

## 2022-03-21 PROCEDURE — 97110 THERAPEUTIC EXERCISES: CPT

## 2022-03-21 NOTE — PROGRESS NOTES
Fredi Black  : 1940  Primary: [facility] [place]*  Secondary:  [address] @ [place] [address].  [phone]   [phone]      OUTPATIENT PHYSICAL THERAPY:  Daily Treatment Note  3/21/2022   ICD-10: Treatment Diagnosis: Pain in right wrist (M25.531), Stiffness of right wrist, not elsewhere classified (M25.631), Pain in left wrist (M25.532), Stiffness of left wrist, not elsewhere classified (M25.632), Stiffness of right hand, not elsewhere classified (M25.641) and Stiffness of left hand, not elsewhere classified (M25.642)  MEDICAL/REFERRING DIAGNOSIS:  Status post bilateral distal radius fractures   TREATMENT PLAN:  Effective Dates: 3/7/22 to 22 (60 days). Frequency/Duration: 2 times a week for 60 Days  GOALS: (Goals have been discussed and agreed upon with patient.)  Short-Term Functional Goals: Time Frame: 4 weeks:   1. Independent performance of home exercise program (MET)  2. Reduce R hand edema to normal levels to allow normal finger to palm fist motions (MET)  3. Increase R wrist/hand ROM's to 75% or better of normal  In all planes of motion (MET)  Discharge Goals: Time Frame: 12 weeks  1. Pt to demonstrate at least 35#   Strength L/R for normal weight carrying (progressing)  2. Pt to demonstrate L/R wrist/ hand ROM's 85% or better of normal all planes (progressing)  3. Improve Quick-DASH scores to 15/55 or better to reflect return to normal ADL function (progressing)  Rehabilitation Potential For Stated Goals: Good  _______________________________________________________________________  Pre-treatment Symptoms/Complaints: Really notices the difference in her hand function after two days off from therapy over the weekend, is stiffer. Thumb pain woke her up last night.     Pain: Initial: 3/10 Post Session:  No increase/10   Medications Last Reviewed:  3/21/2022  Updated Objective Findings: Observation/Orthostatic Postural Assessment:    3/9/22: minimal R digit swelling. Palpation:    Diffuse right digital swelling, mild dorsal R hand swelling (12/29/2021)  ROM:    Left : Pt demonstrates ability to perform full left hook, open and full fists. AROM's : wrist flexion = 40 deg, extension = 45 deg, pronation/supination 75% of full AROM, Ulnar deviation = 18 deg, radial deviation = 20 deg. Right: Passive [place] digit ROM's: 2: passive RODRIGEZ now 75%. (12/22/2021), MCP flexion at index = 90 deg (1/22/22) , Thumb IP = full extension, 50% flexion, MCP = full extension, 50% flexion. CMC: 25% flexion. CMC radial abduction 40 deg. Wrist flexion P/AROM = 55 deg (1/5/2022), extension A/PROM = 40 deg/48 deg  Deg (1/5/22), Ulnar deviation = 15 deg PROM, radial deviation = to 15 deg. R forearm active/passive. pronation/supination  = ~ 60 deg to 75 deg (12/29/2021). Iyfemvngju-ih-mputiuwh palmar crease measures: R hand: index and ring 4.5 cm (1/31/22)  Strength:   Left:  digits all 4-/5, Wrist flex/ext all 4/5  Right: digits all 3-/5, wrist flex/ext 4-/5   (1/19/22): 3rd run dynamometer: left = 32 #, right = 15#. 1/28/22: right = 15#    2/7/22: Fingernail to palm finger flexion measures R hand: Index = 3.5cm, Middle: 3.5 cm, Ring: 3.4 cm, 5th finger: 3.4 cm. Able to flex each MCP at digits 2-5 to 90 deg, and at each PIP to 90 deg, each w/stiff end feel. Able to lateral pinch with index to middle phalanx and tip, able to pinch to tip of middle digit with effort.  strength at 3rd rung = 17#. Quick-DASH score: 29/55  2/9/22: Pre treatment:   R hand ROM's: MCP flexion# 2 = 40 deg, #3 = 40 deg, # 4 = 40 deg, #5 = 30 deg. PIP flexion: #2 = 40 deg, # 3 = 50 deg, # 4 = 52 deg, # 5 = 50 deg,   DIP fleixon: #2 = 40 deg, # 3 = 30 deg, # 4 = 30 deg, # 5 = 28 deg. Fingernail to proximal palmar crease distances: #2 = 6cm, # 3 = 6 cm, # 4 = 5.5 cm, # 5 = 4.3 cm.    Post treatment:   MCP flexion : #2 = 60 deg, # 3 = 55 deg  PIP fleixon: #2 = 88 deg, # 3 = 70 deg. 2/16/22:  Pre treatment:   R hand ROM's: MCP flexion# 2 = 54 deg, #3 = 44 deg, # 4 = 32 deg, #5 = 26 deg. PIP flexion: #2 = 40 deg, # 3 = 50 deg, # 4 = 55 deg, # 5 = 50 deg,   DIP fleixon: #2 = 32 deg, # 3 = 32 deg, # 4 = 42 deg, # 5 = 44 deg. Post treatment fingertipe to prox. Palmar crease distance: index = 4.6cm, middle = 4.6 cm. : #2: left = 16#, right = 26#, #4: left = 18#, right = 32#.   2/25/22: Wrist PROM's : flexion = 52 deg, extn = 38 deg, radial dev. = 12 deg, ulnar dev = 25 deg. Quick DASH = 32/55. 3/7/22: PIP flexion PROM: 2nd = 80 deg, 3rd = 82 deg, 4th = 82 deg, 5th = 85 deg.   3/11/22: Fingertip to prox. Palmar crease: Index = 3.5cm, Middle = 3.2cm    3/18/22: MCP flexion: #2: 70 deg A, 85 deg P, #3: 60 deg A, 80 deg P, # 4: 77 deg P  PIP flexion: #2: 65 deg A, 75 deg P, #3: 75 deg A, 88 deg P, #4: 80 deg P  Full fist fingertip to palm distance: #2: 4.7 cm A, 4.0cm P, #3 5.0cm A, 3.0 cm P, # 5: 4.9 cm A, 3.3 cm P. Wrist AROM/PROM R: flexion : 40 deg A, 43 deg. P, Extension: 40 deg A, 45 deg P. Quick Dash: 25/55     TREATMENT:   THERAPEUTIC ACTIVITY: ( see below for minutes): Therapeutic activities per grid below to improve mobility, strength, balance and coordination. Required minimal visual, verbal, manual and tactile cues to improve independence and safety with daily activities . THERAPEUTIC EXERCISE: (see below for minutes):  Exercises per grid below to improve mobility, strength, balance and coordination. Required minimal verbal and manual cues to promote proper body alignment, promote proper body posture and promote proper body mechanics. Progressed resistance, range, repetitions and complexity of movement as indicated.   MANUAL THERAPY: (see below for minutes): Joint mobilization and Soft tissue mobilization was utilized and necessary because of the patient's restricted joint motion, painful spasm, loss of articular motion and restricted motion of soft tissue. MODALITIES: (see below for minutes):      to decrease pain    SELF CARE: (see below for minutes): Procedure(s) (per grid) utilized to improve and/or restore self-care/home management as related to dressing, bathing and grooming. Required minimal verbal cueing to facilitate activities of daily living skills and compensatory activities. Date: 3/9/22 (visit 40)  3/11/22 (visit 39) 3/14/22 (visit 55) 3/16/22 (visit 52) 3/18/22 (visit 50) 3/21/22 (visit 52)   Modalities:                                    Therapeutic Exercise: 43 min 43 min 43 min 39 min 43 min 43 min    MCP static hold stretch : 2-3 x 3 min, 4-5 x 2 min, Hook fist stretch 2-3 and 4-5 as well as 2-5, 2nd digit tendon glide stretch x 10 reps Static hold stretches: MCP 2-3, 4-5 ,  hook fist stretch 2-5, full fist stretch 2-5, Full fist w/ tendon glides x 15 20 min Static hold stretches: MCP 2-3 and 4-5, hook fist. Full fist w/ flexor tendon glides. 20 min Static hold stretching: MCP, PIP's with traction in flexion (2-5), individual finger and combined hook fist and full fist stretches. Static hold stretching: finger flexion: MCP's 2-3-4-5 individual and combined with hook and full fist, PIP hook and full fist,  DIP hook and full fist x 30 min Isolated flexion stretching each digit 2-5 at MCP's, f/b hook fist and open fist stretching each digit and combined    Wrist radial dev. Stretch x 2 min 2- 5 finger extension at PIP/DIP's : 2 x 2 min Static hold PIP/DIP extn stretches 2-5 individually. Flexor tendon glides: lumbrical tables, hook fist and full fist 15 each Fingers 2-3 passive stretch at PIP's x 2 min each.   Light resist flexor tendon glides: hook and full fist x 15 each    Thumb stretching: MCP-PIP x 2 min Thumb stretch MCP x 2  Min, IP x 1 min  Thumb stretch: CMC flex/opposition and abduction, MCP flexion : 3 min Thumb stretch: CMC flex/opposition and abduction, MCP flexion : 3 min Resisted full fist (manl) 2 x 15 thera web green: finger and thumb flexion at PIP's and DIP's x 15, all finger abduction x 15    AROM: lumbrical table flexion x 15, hook fist AROM x 15 Theraputty brown resisted hook fist x 15  Finger flex gripping w/4-5\" holds: 2 x 10 into green ball, 10 into spring gripper Putty squeezes: Key pinches and twists with index/middle x 15, individual pinch to tips index and middle x 10 each (blue putty) Resisted wrist extn and wrist flexion (manl resist) 2 x 15 each R.  R Wrist flexion curls w/ 4#, R extension w/ 4# x 30, R Radial deviation x 30 w 3#    Hand gripper 15 x 5\"  Clothespin pinch/release 2 x 5 each index, middle.:  Manl stretching: wrist extn. , supination x 4 min Open fist resisted; blue putty x 15   Biceps curls w/ 5# x 30 R    Thumb to finger coordiation: index, middle, ring and 5th x 5 sets Pronation w/ 2#  X 15, Radial dev. W/2# x 15 Pronation w/ 2#  X 15, Radial dev. W/2#, 2 x 15   R arm carry 7#: 4 x 50'    Large marble pinch gripping : thumb to index, middle, right; 5 x 5\" each  Wrist manl stretch into supination x 2 min    Black t-tube rows; 2 x 20 bilat. Theraputty brown resisted hook fist x 15      Clothespin pinches 5x each index, middle and ring fingers R, manl resist at 5th finger R    Wrist flexion curls w/ 3# x 20      Green therabar Tug-of war R hand x 10, twists pron/sup 2 x 10. Pronation /supination w/ 2# x 15     Fingers 2-3 passive stretch at PIP's x 1 min each. Green flexbar bilat supinations x 20, wrist flex/ ext x 15         Red flexbar pronations x 15.          Proprioceptive Activities:                                    Manual Therapy:                                             Therapeutic Activities:                                      HEP: Exercises  Exercises  Seated Wrist Extension PROM - 2 x daily - 7 x weekly - 5 reps - 15 second hold  Seated Wrist Prayer Stretch - 2 x daily - 7 x weekly - 5 sets - 15 second hold  Seated Wrist Flexion with Overpressure - 2 x daily - 7 x weekly - 10 reps - 15 second hold  Seated Finger Composite Flexion Stretch - 2 x daily - 7 x weekly - 3 reps - 2 minuessccond hold  Wrist Pronation Stretch - 2 x daily - 7 x weekly - 5 sets - 15 second hold  Seated Wrist Supination Stretch - 2 x daily - 7 x weekly - 5 sets - 15 second hold    Added 12/10/2021: Emphasize 5 to 10 min holds on steady finger flexion stretch (MCP/IP to palm). OK to work on thumb-finger pinch using clothespin squeeze. 1/5/22: hand compression garments issued ([employer]), wear as much as tolerated, including at night. 2/7/22: start static hold full fist stretches x 5 min, 3x/day, and MCP flexion stretches w/ holds x 5 min. [employer]  Treatment/Session Summary:    · Response to Treatment: Functional  ROM/strength improving - able to start carry w/ weight, rows today without fingers giving away. Opposition of thumb to 5th finger still weak/limited ROM. · Communication/Consultation:  None today  · Equipment provided today:  None today   · Recommendations/Intent for next treatment session: Emphasis on a/p/rrom to regain flexor tendon/finger flexion RODRIGEZ and thumb opposition for normal /grasp, pronation/supination. Manual therapy for joint and tendon mobility.      Total Treatment Billable Duration:  43 minutes  PT Patient Time In/Time Out  Time In: 1101  Time Out: 250 [facility], PT    Future Appointments   Date Time Provider Kamille Reis   3/23/2022 11:00 AM Matilda No, PT [facility]   3/25/2022 11:00 AM Matilda No, PT [facility]   3/28/2022 11:00 AM Matilda No, PT [facility]   3/30/2022 11:00 AM Matilda No, PT [facility]   4/4/2022 11:00 AM Matilda No, PT [facility]   4/6/2022 11:00 AM Matilda No, PT [facility]   4/14/2022 11:00 AM Matilda No, PT [facility]   4/18/2022 11:00 AM Tawny Damico, PT [facility] 4/20/2022 11:00 AM Aldair Resendiz, PT [facility]   4/25/2022 11:00 AM Aldair Resendiz, PT [facility]   4/27/2022 11:00 AM Too Lantigua, PT [facility]

## 2022-03-23 ENCOUNTER — HOSPITAL ENCOUNTER (OUTPATIENT)
Dept: PHYSICAL THERAPY | Age: 82
Discharge: HOME | End: 2022-03-23
Payer: COMMERCIAL

## 2022-03-23 PROCEDURE — 97110 THERAPEUTIC EXERCISES: CPT

## 2022-03-23 NOTE — PROGRESS NOTES
David Black  : 1940  Primary: [facility] [place]*  Secondary:  [address] @ [address]  [phone]   [phone]      OUTPATIENT PHYSICAL THERAPY:  Daily Treatment Note  3/23/2022   ICD-10: Treatment Diagnosis: Pain in right wrist (M25.531), Stiffness of right wrist, not elsewhere classified (M25.631), Pain in left wrist (M25.532), Stiffness of left wrist, not elsewhere classified (M25.632), Stiffness of right hand, not elsewhere classified (M25.641) and Stiffness of left hand, not elsewhere classified (M25.642)  MEDICAL/REFERRING DIAGNOSIS:  Status post bilateral distal radius fractures   TREATMENT PLAN:  Effective Dates: 3/7/22 to 22 (60 days). Frequency/Duration: 2 times a week for 60 Days  GOALS: (Goals have been discussed and agreed upon with patient.)  Short-Term Functional Goals: Time Frame: 4 weeks:   1. Independent performance of home exercise program (MET)  2. Reduce R hand edema to normal levels to allow normal finger to palm fist motions (MET)  3. Increase R wrist/hand ROM's to 75% or better of normal  In all planes of motion (MET)  Discharge Goals: Time Frame: 12 weeks  1. Pt to demonstrate at least 35#   Strength L/R for normal weight carrying (progressing)  2. Pt to demonstrate L/R wrist/ hand ROM's 85% or better of normal all planes (progressing)  3. Improve Quick-DASH scores to 15/55 or better to reflect return to normal ADL function (progressing)  Rehabilitation Potential For Stated Goals: Good  _______________________________________________________________________  Pre-treatment Symptoms/Complaints: Better motion bu thand stiffens up overnight , starts stiff each day. Pain: Initial: 3/10 Post Session:  No increase/10   Medications Last Reviewed:  3/23/2022  Updated Objective Findings:    Observation/Orthostatic Postural Assessment:    3/9/22: minimal R digit swelling. Palpation:    Diffuse right digital swelling, mild dorsal R hand swelling (12/29/2021)  ROM:    Left : Pt demonstrates ability to perform full left hook, open and full fists. AROM's : wrist flexion = 40 deg, extension = 45 deg, pronation/supination 75% of full AROM, Ulnar deviation = 18 deg, radial deviation = 20 deg. Right: Passive [place] digit ROM's: 2: passive RODRIGEZ now 75%. (12/22/2021), MCP flexion at index = 90 deg (1/22/22) , Thumb IP = full extension, 50% flexion, MCP = full extension, 50% flexion. CMC: 25% flexion. CMC radial abduction 40 deg. Wrist flexion P/AROM = 55 deg (1/5/2022), extension A/PROM = 40 deg/48 deg  Deg (1/5/22), Ulnar deviation = 15 deg PROM, radial deviation = to 15 deg. R forearm active/passive. pronation/supination  = ~ 60 deg to 75 deg (12/29/2021). Cvkhhoudor-oi-ejejlfuy palmar crease measures: R hand: index and ring 4.5 cm (1/31/22)  Strength:   Left:  digits all 4-/5, Wrist flex/ext all 4/5  Right: digits all 3-/5, wrist flex/ext 4-/5   (1/19/22): 3rd run dynamometer: left = 32 #, right = 15#. 1/28/22: right = 15#    2/7/22: Fingernail to palm finger flexion measures R hand: Index = 3.5cm, Middle: 3.5 cm, Ring: 3.4 cm, 5th finger: 3.4 cm. Able to flex each MCP at digits 2-5 to 90 deg, and at each PIP to 90 deg, each w/stiff end feel. Able to lateral pinch with index to middle phalanx and tip, able to pinch to tip of middle digit with effort.  strength at 3rd rung = 17#. Quick-DASH score: 29/55  2/9/22: Pre treatment:   R hand ROM's: MCP flexion# 2 = 40 deg, #3 = 40 deg, # 4 = 40 deg, #5 = 30 deg. PIP flexion: #2 = 40 deg, # 3 = 50 deg, # 4 = 52 deg, # 5 = 50 deg,   DIP fleixon: #2 = 40 deg, # 3 = 30 deg, # 4 = 30 deg, # 5 = 28 deg. Fingernail to proximal palmar crease distances: #2 = 6cm, # 3 = 6 cm, # 4 = 5.5 cm, # 5 = 4.3 cm. Post treatment:   MCP flexion : #2 = 60 deg, # 3 = 55 deg  PIP fleixon: #2 = 88 deg, # 3 = 70 deg.    2/16/22:  Pre treatment:   R hand ROM's: MCP flexion# 2 = 54 deg, #3 = 44 deg, # 4 = 32 deg, #5 = 26 deg. PIP flexion: #2 = 40 deg, # 3 = 50 deg, # 4 = 55 deg, # 5 = 50 deg,   DIP fleixon: #2 = 32 deg, # 3 = 32 deg, # 4 = 42 deg, # 5 = 44 deg. Post treatment fingertipe to prox. Palmar crease distance: index = 4.6cm, middle = 4.6 cm. : #2: left = 16#, right = 26#, #4: left = 18#, right = 32#.   2/25/22: Wrist PROM's : flexion = 52 deg, extn = 38 deg, radial dev. = 12 deg, ulnar dev = 25 deg. Quick DASH = 32/55. 3/7/22: PIP flexion PROM: 2nd = 80 deg, 3rd = 82 deg, 4th = 82 deg, 5th = 85 deg.   3/11/22: Fingertip to prox. Palmar crease: Index = 3.5cm, Middle = 3.2cm    3/18/22: MCP flexion: #2: 70 deg A, 85 deg P, #3: 60 deg A, 80 deg P, # 4: 77 deg P  PIP flexion: #2: 65 deg A, 75 deg P, #3: 75 deg A, 88 deg P, #4: 80 deg P  Full fist fingertip to palm distance: #2: 4.7 cm A, 4.0cm P, #3 5.0cm A, 3.0 cm P, # 5: 4.9 cm A, 3.3 cm P. Wrist AROM/PROM R: flexion : 40 deg A, 43 deg. P, Extension: 40 deg A, 45 deg P. Quick Dash: 25/55     TREATMENT:   THERAPEUTIC ACTIVITY: ( see below for minutes): Therapeutic activities per grid below to improve mobility, strength, balance and coordination. Required minimal visual, verbal, manual and tactile cues to improve independence and safety with daily activities . THERAPEUTIC EXERCISE: (see below for minutes):  Exercises per grid below to improve mobility, strength, balance and coordination. Required minimal verbal and manual cues to promote proper body alignment, promote proper body posture and promote proper body mechanics. Progressed resistance, range, repetitions and complexity of movement as indicated. MANUAL THERAPY: (see below for minutes): Joint mobilization and Soft tissue mobilization was utilized and necessary because of the patient's restricted joint motion, painful spasm, loss of articular motion and restricted motion of soft tissue.    MODALITIES: (see below for minutes): to decrease pain    SELF CARE: (see below for minutes): Procedure(s) (per grid) utilized to improve and/or restore self-care/home management as related to dressing, bathing and grooming. Required minimal verbal cueing to facilitate activities of daily living skills and compensatory activities. Date: 3/23/22 (visit 50)        Modalities:                                    Therapeutic Exercise: 38 min         Thumb CMC flexion stretch x 2 min, abd x 1 min, IP/MCP flexion stretch w/ opposition to 5th x 2 min         Isolated and combined flexion stretch/holds at MCP/PIP/DIP digits 2-5 x 10 min         Flexor tendon glides w/ assist; hook fist 2 x 15, full fist  X 30          Resisted MCP/PIP extension x 30          R wrist flexion curls x 2 x 30, extension 2 x 30 @ 3#         Pron/sup twists w/ green therabar x 20         Pron/sup bends bilat w/ therabar x 30 each         R elbow curls x 30 w/5#. Theraweb green: wide finger flexion at MCPs x 30 , dip's x 30          Red putty pinch /pulls x 15                           Proprioceptive Activities:                                    Manual Therapy:                                             Therapeutic Activities:                                      HEP: Exercises  Exercises  Seated Wrist Extension PROM - 2 x daily - 7 x weekly - 5 reps - 15 second hold  Seated Wrist Prayer Stretch - 2 x daily - 7 x weekly - 5 sets - 15 second hold  Seated Wrist Flexion with Overpressure - 2 x daily - 7 x weekly - 10 reps - 15 second hold  Seated Finger Composite Flexion Stretch - 2 x daily - 7 x weekly - 3 reps - 2 minuessccond hold  Wrist Pronation Stretch - 2 x daily - 7 x weekly - 5 sets - 15 second hold  Seated Wrist Supination Stretch - 2 x daily - 7 x weekly - 5 sets - 15 second hold    Added 12/10/2021: Emphasize 5 to 10 min holds on steady finger flexion stretch (MCP/IP to palm). OK to work on thumb-finger pinch using clothespin squeeze.    1/5/22: hand compression garments issued (incrediwear), wear as much as tolerated, including at night. 2/7/22: start static hold full fist stretches x 5 min, 3x/day, and MCP flexion stretches w/ holds x 5 min. [employer]  Treatment/Session Summary:    · Response to Treatment: 85-90% of hook fist positions at 2-5 , when assisted. · Communication/Consultation:  None today  · Equipment provided today:  None today   · Recommendations/Intent for next treatment session: Emphasis on a/p/rrom to regain flexor tendon/finger flexion RODRIGEZ and thumb opposition for normal /grasp, pronation/supination. Manual therapy for joint and tendon mobility.      Total Treatment Billable Duration:  38 minutes  PT Patient Time In/Time Out  Time In: 0666  Time Out: [address],     Future Appointments   Date Time Provider Kamille Reis   3/25/2022 11:00 AM Adiel Barlow, PT [facility]   3/28/2022 11:00 AM Adiel Barlow, PT [facility]   3/30/2022 11:00 AM [facility]   4/4/2022 11:00 AM [facility]   4/6/2022 11:00 AM [facility]   4/14/2022 11:00 [facility]   4/18/2022 11:00 AM [facility]   4/20/2022 11:00 AM Adiel Barlow, PT [facility]   4/25/2022 11:00 AM Adiel Barlow, PT [facility]   4/27/2022 11:00 AM Magaly Mathews, [facility]

## 2022-03-25 ENCOUNTER — HOSPITAL ENCOUNTER (OUTPATIENT)
Dept: PHYSICAL THERAPY | Age: 82
Discharge: HOME | End: 2022-03-25
Payer: COMMERCIAL

## 2022-03-25 PROCEDURE — 97110 THERAPEUTIC EXERCISES: CPT

## 2022-03-25 NOTE — PROGRESS NOTES
Fredi Black  : 1940  Primary: [facility] [place]*  Secondary:  [address] @ [place] [address].  [phone]   [phone]      OUTPATIENT PHYSICAL THERAPY:  Daily Treatment Note  3/25/2022   ICD-10: Treatment Diagnosis: Pain in right wrist (M25.531), Stiffness of right wrist, not elsewhere classified (M25.631), Pain in left wrist (M25.532), Stiffness of left wrist, not elsewhere classified (M25.632), Stiffness of right hand, not elsewhere classified (M25.641) and Stiffness of left hand, not elsewhere classified (M25.642)  MEDICAL/REFERRING DIAGNOSIS:  Status post bilateral distal radius fractures   TREATMENT PLAN:  Effective Dates: 3/7/22 to 22 (60 days). Frequency/Duration: 2 times a week for 60 Days  GOALS: (Goals have been discussed and agreed upon with patient.)  Short-Term Functional Goals: Time Frame: 4 weeks:   1. Independent performance of home exercise program (MET)  2. Reduce R hand edema to normal levels to allow normal finger to palm fist motions (MET)  3. Increase R wrist/hand ROM's to 75% or better of normal  In all planes of motion (MET)  Discharge Goals: Time Frame: 12 weeks  1. Pt to demonstrate at least 35#   Strength L/R for normal weight carrying (progressing)  2. Pt to demonstrate L/R wrist/ hand ROM's 85% or better of normal all planes (progressing)  3. Improve Quick-DASH scores to 15/55 or better to reflect return to normal ADL function (progressing)  Rehabilitation Potential For Stated Goals: Good  _______________________________________________________________________  Pre-treatment Symptoms/Complaints: R hand feels stiffer today, mild swelling. Pain: Initial: 3/10 Post Session:  No increase/10   Medications Last Reviewed:  3/25/2022  Updated Objective Findings:    Observation/Orthostatic Postural Assessment:    3/9/22: minimal R digit swelling.    Palpation:    Diffuse right digital swelling, mild dorsal R hand swelling (12/29/2021)  ROM:    Left : Pt demonstrates ability to perform full left hook, open and full fists. AROM's : wrist flexion = 40 deg, extension = 45 deg, pronation/supination 75% of full AROM, Ulnar deviation = 18 deg, radial deviation = 20 deg. Right: Passive [place] digit ROM's: 2: passive RODRIGEZ now 75%. (12/22/2021), MCP flexion at index = 90 deg (1/22/22) , Thumb IP = full extension, 50% flexion, MCP = full extension, 50% flexion. CMC: 25% flexion. CMC radial abduction 40 deg. Wrist flexion P/AROM = 55 deg (1/5/2022), extension A/PROM = 40 deg/48 deg  Deg (1/5/22), Ulnar deviation = 15 deg PROM, radial deviation = to 15 deg. R forearm active/passive. pronation/supination  = ~ 60 deg to 75 deg (12/29/2021). Ugxhlotzrh-sj-bulkfdgy palmar crease measures: R hand: index and ring 4.5 cm (1/31/22)  Strength:   Left:  digits all 4-/5, Wrist flex/ext all 4/5  Right: digits all 3-/5, wrist flex/ext 4-/5   (1/19/22): 3rd run dynamometer: left = 32 #, right = 15#. 1/28/22: right = 15#    2/7/22: Fingernail to palm finger flexion measures R hand: Index = 3.5cm, Middle: 3.5 cm, Ring: 3.4 cm, 5th finger: 3.4 cm. Able to flex each MCP at digits 2-5 to 90 deg, and at each PIP to 90 deg, each w/stiff end feel. Able to lateral pinch with index to middle phalanx and tip, able to pinch to tip of middle digit with effort.  strength at 3rd rung = 17#. Quick-DASH score: 29/55  2/9/22: Pre treatment:   R hand ROM's: MCP flexion# 2 = 40 deg, #3 = 40 deg, # 4 = 40 deg, #5 = 30 deg. PIP flexion: #2 = 40 deg, # 3 = 50 deg, # 4 = 52 deg, # 5 = 50 deg,   DIP fleixon: #2 = 40 deg, # 3 = 30 deg, # 4 = 30 deg, # 5 = 28 deg. Fingernail to proximal palmar crease distances: #2 = 6cm, # 3 = 6 cm, # 4 = 5.5 cm, # 5 = 4.3 cm. Post treatment:   MCP flexion : #2 = 60 deg, # 3 = 55 deg  PIP fleixon: #2 = 88 deg, # 3 = 70 deg.    2/16/22:  Pre treatment:   R hand ROM's: MCP flexion# 2 = 54 deg, #3 = 44 deg, # 4 = 32 deg, #5 = 26 deg. PIP flexion: #2 = 40 deg, # 3 = 50 deg, # 4 = 55 deg, # 5 = 50 deg,   DIP fleixon: #2 = 32 deg, # 3 = 32 deg, # 4 = 42 deg, # 5 = 44 deg. Post treatment fingertipe to prox. Palmar crease distance: index = 4.6cm, middle = 4.6 cm. : #2: left = 16#, right = 26#, #4: left = 18#, right = 32#.   2/25/22: Wrist PROM's : flexion = 52 deg, extn = 38 deg, radial dev. = 12 deg, ulnar dev = 25 deg. Quick DASH = 32/55. 3/7/22: PIP flexion PROM: 2nd = 80 deg, 3rd = 82 deg, 4th = 82 deg, 5th = 85 deg.   3/11/22: Fingertip to prox. Palmar crease: Index = 3.5cm, Middle = 3.2cm    3/18/22: MCP flexion: #2: 70 deg A, 85 deg P, #3: 60 deg A, 80 deg P, # 4: 77 deg P  PIP flexion: #2: 65 deg A, 75 deg P, #3: 75 deg A, 88 deg P, #4: 80 deg P  Full fist fingertip to palm distance: #2: 4.7 cm A, 4.0cm P, #3 5.0cm A, 3.0 cm P, # 5: 4.9 cm A, 3.3 cm P. Wrist AROM/PROM R: flexion : 40 deg A, 43 deg. P, Extension: 40 deg A, 45 deg P. Quick Dash: 25/55  3/25/22: R : # 2 = 15#, # 4 = 19#. TREATMENT:   THERAPEUTIC ACTIVITY: ( see below for minutes): Therapeutic activities per grid below to improve mobility, strength, balance and coordination. Required minimal visual, verbal, manual and tactile cues to improve independence and safety with daily activities . THERAPEUTIC EXERCISE: (see below for minutes):  Exercises per grid below to improve mobility, strength, balance and coordination. Required minimal verbal and manual cues to promote proper body alignment, promote proper body posture and promote proper body mechanics. Progressed resistance, range, repetitions and complexity of movement as indicated. MANUAL THERAPY: (see below for minutes): Joint mobilization and Soft tissue mobilization was utilized and necessary because of the patient's restricted joint motion, painful spasm, loss of articular motion and restricted motion of soft tissue.    MODALITIES: (see below for minutes):      to decrease pain    SELF CARE: (see below for minutes): Procedure(s) (per grid) utilized to improve and/or restore self-care/home management as related to dressing, bathing and grooming. Required minimal verbal cueing to facilitate activities of daily living skills and compensatory activities. Date: 3/23/22 (visit 50) 3/25/22 (visit 51)       Modalities:                                    Therapeutic Exercise: 38 min 42 min        Thumb CMC flexion stretch x 2 min, abd x 1 min, IP/MCP flexion stretch w/ opposition to 5th x 2 min Passive stretching: MCP flexion 2-5, MCP and PIP for open fist, all MCP/IP for full fist        Isolated and combined flexion stretch/holds at MCP/PIP/DIP digits 2-5 x 10 min Flexor tendon glides: Open fist and hook fist w/ AROM and light resistance at phalanges: 3 x 15         Flexor tendon glides w/ assist; hook fist 2 x 15, full fist  X 30  R Thumb PROM: thumb opposition at [facility], thumb abd, flexion at MCP, thumb circumductions : 4 min        Resisted MCP/PIP extension x 30  Putty: red index/middle pull aparts x 10, index/middle twists x 10        R wrist flexion curls x 2 x 30, extension 2 x 30 @ 3# R PIP finger flexion static stretches 2-4 x 3 min        Pron/sup twists w/ green therabar x 20 Biceps curls R x 40 w/5#        Pron/sup bends bilat w/ therabar x 30 each Wrist curls (flexion, extension), 30 each w/ 3#        R elbow curls x 30 w/5#.   R wrist extension stretching x 3 min        Theraweb green: wide finger flexion at MCPs x 30 , dip's x 30          Red putty pinch /pulls x 15                           Proprioceptive Activities:                                    Manual Therapy:                                             Therapeutic Activities:                                      HEP: Exercises  Exercises  Seated Wrist Extension PROM - 2 x daily - 7 x weekly - 5 reps - 15 second hold  Seated Wrist Prayer Stretch - 2 x daily - 7 x weekly - 5 sets - 15 second hold  Seated Wrist Flexion with Overpressure - 2 x daily - 7 x weekly - 10 reps - 15 second hold  Seated Finger Composite Flexion Stretch - 2 x daily - 7 x weekly - 3 reps - 2 minuessccond hold  Wrist Pronation Stretch - 2 x daily - 7 x weekly - 5 sets - 15 second hold  Seated Wrist Supination Stretch - 2 x daily - 7 x weekly - 5 sets - 15 second hold    Added 12/10/2021: Emphasize 5 to 10 min holds on steady finger flexion stretch (MCP/IP to palm). OK to work on thumb-finger pinch using clothespin squeeze. 1/5/22: hand compression garments issued ([employer]), wear as much as tolerated, including at night. 2/7/22: start static hold full fist stretches x 5 min, 3x/day, and MCP flexion stretches w/ holds x 5 min. [employer]  Treatment/Session Summary:    · Response to Treatment: Progress with open fist A/Prom, best at middle finger where finger pad is ~ 2cm from palm   · Communication/Consultation:  None today  · Equipment provided today:  None today   · Recommendations/Intent for next treatment session: Emphasis on a/p/rrom to regain flexor tendon/finger flexion RODRIGEZ and thumb opposition for normal /grasp, pronation/supination. Manual therapy for joint and tendon mobility.      Total Treatment Billable Duration:  42 minutes  PT Patient Time In/Time Out  Time In: 5383  Time Out: 250 [facility],     Future Appointments   Date Time Provider Kamille Reis   3/28/2022  2:45 PM Aiden Haley, PT [facility]   3/30/2022 11:00 AM Aiden Haley, PT [facility]   4/4/2022 11:00 AM Aiden Haley, PT [facility]   4/6/2022 11:00 AM Aiden Haley, PT [facility]   4/14/2022 11:00 AM Aiden Haley, PT [facility]   4/18/2022 11:00 AM Aiden Haley, PT [facility]   4/20/2022 11:00 AM iAden Haley, PT [facility]   4/25/2022 11:00 AM Aiden Haley, PT [facility]   4/27/2022 11:00 AM Zeyad Romano, PT [facility]

## 2022-03-28 ENCOUNTER — HOSPITAL ENCOUNTER (OUTPATIENT)
Dept: PHYSICAL THERAPY | Age: 82
Discharge: HOME | End: 2022-03-28
Payer: COMMERCIAL

## 2022-03-28 PROCEDURE — 97110 THERAPEUTIC EXERCISES: CPT

## 2022-03-28 NOTE — PROGRESS NOTES
Merrick Black  : 1940  Primary: [facility] [place]*  Secondary:  [address] @ [place] [address].  [phone]   [phone]      OUTPATIENT PHYSICAL THERAPY:  Daily Treatment Note  3/28/2022   ICD-10: Treatment Diagnosis: Pain in right wrist (M25.531), Stiffness of right wrist, not elsewhere classified (M25.631), Pain in left wrist (M25.532), Stiffness of left wrist, not elsewhere classified (M25.632), Stiffness of right hand, not elsewhere classified (M25.641) and Stiffness of left hand, not elsewhere classified (M25.642)  MEDICAL/REFERRING DIAGNOSIS:  Status post bilateral distal radius fractures   TREATMENT PLAN:  Effective Dates: 3/7/22 to 22 (60 days). Frequency/Duration: 2 times a week for 60 Days  GOALS: (Goals have been discussed and agreed upon with patient.)  Short-Term Functional Goals: Time Frame: 4 weeks:   1. Independent performance of home exercise program (MET)  2. Reduce R hand edema to normal levels to allow normal finger to palm fist motions (MET)  3. Increase R wrist/hand ROM's to 75% or better of normal  In all planes of motion (MET)  Discharge Goals: Time Frame: 12 weeks  1. Pt to demonstrate at least 35#   Strength L/R for normal weight carrying (progressing)  2. Pt to demonstrate L/R wrist/ hand ROM's 85% or better of normal all planes (progressing)  3. Improve Quick-DASH scores to 15/55 or better to reflect return to normal ADL function (progressing)  Rehabilitation Potential For Stated Goals: Good  _______________________________________________________________________  Pre-treatment Symptoms/Complaints: Pt was back to referring surgeon earlier today - she states that he was pleased with progress but wanted her to continue PT due to hand stiffness.     Pain: Initial: 3/10 Post Session:  No increase/10   Medications Last Reviewed:  3/28/2022  Updated Objective Findings: Observation/Orthostatic Postural Assessment:    3/9/22: minimal R digit swelling. Palpation:    Diffuse right digital swelling, mild dorsal R hand swelling (12/29/2021)  ROM:    Left : Pt demonstrates ability to perform full left hook, open and full fists. AROM's : wrist flexion = 40 deg, extension = 45 deg, pronation/supination 75% of full AROM, Ulnar deviation = 18 deg, radial deviation = 20 deg. Right: Passive [place] digit ROM's: 2: passive RODRIGEZ now 75%. (12/22/2021), MCP flexion at index = 90 deg (1/22/22) , Thumb IP = full extension, 50% flexion, MCP = full extension, 50% flexion. CMC: 25% flexion. CMC radial abduction 40 deg. Wrist flexion P/AROM = 55 deg (1/5/2022), extension A/PROM = 40 deg/48 deg  Deg (1/5/22), Ulnar deviation = 15 deg PROM, radial deviation = to 15 deg. R forearm active/passive. pronation/supination  = ~ 60 deg to 75 deg (12/29/2021). Lrkqwbsjvw-gq-qqymljig palmar crease measures: R hand: index and ring 4.5 cm (1/31/22)  Strength:   Left:  digits all 4-/5, Wrist flex/ext all 4/5  Right: digits all 3-/5, wrist flex/ext 4-/5   (1/19/22): 3rd run dynamometer: left = 32 #, right = 15#. 1/28/22: right = 15#    2/7/22: Fingernail to palm finger flexion measures R hand: Index = 3.5cm, Middle: 3.5 cm, Ring: 3.4 cm, 5th finger: 3.4 cm. Able to flex each MCP at digits 2-5 to 90 deg, and at each PIP to 90 deg, each w/stiff end feel. Able to lateral pinch with index to middle phalanx and tip, able to pinch to tip of middle digit with effort.  strength at 3rd rung = 17#. Quick-DASH score: 29/55  2/9/22: Pre treatment:   R hand ROM's: MCP flexion# 2 = 40 deg, #3 = 40 deg, # 4 = 40 deg, #5 = 30 deg. PIP flexion: #2 = 40 deg, # 3 = 50 deg, # 4 = 52 deg, # 5 = 50 deg,   DIP fleixon: #2 = 40 deg, # 3 = 30 deg, # 4 = 30 deg, # 5 = 28 deg. Fingernail to proximal palmar crease distances: #2 = 6cm, # 3 = 6 cm, # 4 = 5.5 cm, # 5 = 4.3 cm.    Post treatment:   MCP flexion : #2 = 60 deg, # 3 = 55 deg  PIP fleixon: #2 = 88 deg, # 3 = 70 deg. 2/16/22:  Pre treatment:   R hand ROM's: MCP flexion# 2 = 54 deg, #3 = 44 deg, # 4 = 32 deg, #5 = 26 deg. PIP flexion: #2 = 40 deg, # 3 = 50 deg, # 4 = 55 deg, # 5 = 50 deg,   DIP fleixon: #2 = 32 deg, # 3 = 32 deg, # 4 = 42 deg, # 5 = 44 deg. Post treatment fingertipe to prox. Palmar crease distance: index = 4.6cm, middle = 4.6 cm. : #2: left = 16#, right = 26#, #4: left = 18#, right = 32#.   2/25/22: Wrist PROM's : flexion = 52 deg, extn = 38 deg, radial dev. = 12 deg, ulnar dev = 25 deg. Quick DASH = 32/55. 3/7/22: PIP flexion PROM: 2nd = 80 deg, 3rd = 82 deg, 4th = 82 deg, 5th = 85 deg.   3/11/22: Fingertip to prox. Palmar crease: Index = 3.5cm, Middle = 3.2cm    3/18/22: MCP flexion: #2: 70 deg A, 85 deg P, #3: 60 deg A, 80 deg P, # 4: 77 deg P  PIP flexion: #2: 65 deg A, 75 deg P, #3: 75 deg A, 88 deg P, #4: 80 deg P  Full fist fingertip to palm distance: #2: 4.7 cm A, 4.0cm P, #3 5.0cm A, 3.0 cm P, # 5: 4.9 cm A, 3.3 cm P. Wrist AROM/PROM R: flexion : 40 deg A, 43 deg. P, Extension: 40 deg A, 45 deg P. Quick Dash: 25/55  3/25/22: R : # 2 = 15#, # 4 = 19#. TREATMENT:   THERAPEUTIC ACTIVITY: ( see below for minutes): Therapeutic activities per grid below to improve mobility, strength, balance and coordination. Required minimal visual, verbal, manual and tactile cues to improve independence and safety with daily activities . THERAPEUTIC EXERCISE: (see below for minutes):  Exercises per grid below to improve mobility, strength, balance and coordination. Required minimal verbal and manual cues to promote proper body alignment, promote proper body posture and promote proper body mechanics. Progressed resistance, range, repetitions and complexity of movement as indicated.   MANUAL THERAPY: (see below for minutes): Joint mobilization and Soft tissue mobilization was utilized and necessary because of the patient's restricted joint motion, painful spasm, loss of articular motion and restricted motion of soft tissue. MODALITIES: (see below for minutes):      to decrease pain    SELF CARE: (see below for minutes): Procedure(s) (per grid) utilized to improve and/or restore self-care/home management as related to dressing, bathing and grooming. Required minimal verbal cueing to facilitate activities of daily living skills and compensatory activities.      Date: 3/23/22 (visit 50) 3/25/22 (visit 51) 3/28/22 (visit 46)      Modalities:                                    Therapeutic Exercise: 38 min 42 min 43 min       Thumb CMC flexion stretch x 2 min, abd x 1 min, IP/MCP flexion stretch w/ opposition to 5th x 2 min Passive stretching: MCP flexion 2-5, MCP and PIP for open fist, all MCP/IP for full fist Finger extension stretching: at PIP's 2-5 x 2 min       Isolated and combined flexion stretch/holds at MCP/PIP/DIP digits 2-5 x 10 min Flexor tendon glides: Open fist and hook fist w/ AROM and light resistance at phalanges: 3 x 15  Finger flexion stretching: static holds at MCP's, combined flexion w/  Open fist and hook fists 2-5 : 30\" holds x 15 min       Flexor tendon glides w/ assist; hook fist 2 x 15, full fist  X 30  R Thumb PROM: thumb opposition at [facility], thumb abd, flexion at MCP, thumb circumductions : 4 min R thumb end range flexion, abduction and opposition stretches: 3 min       Resisted MCP/PIP extension x 30  Putty: red index/middle pull aparts x 10, index/middle twists x 10 Flexor tendon gliding (hook fist, open fist, 20 each       R wrist flexion curls x 2 x 30, extension 2 x 30 @ 3# R PIP finger flexion static stretches 2-4 x 3 min Green therab-ball squeezes x 20        Pron/sup twists w/ green therabar x 20 Biceps curls R x 40 w/5# Manl resist; 4-way ( wrist flex, ext, rad dev, uln dev): 2 x 15 each       Pron/sup bends bilat w/ therabar x 30 each Wrist curls (flexion, extension), 30 each w/ 3# Passive R wrist extnsion stretch x 1 min       R elbow curls x 30 w/5#. R wrist extension stretching x 3 min Theraputty: index/middle pinch pull x 10, hook fists x 20, thumb flexions x 15 (red putty)       Theraweb green: wide finger flexion at MCPs x 30 , dip's x 30          Red putty pinch /pulls x 15                           Proprioceptive Activities:                                    Manual Therapy:                                             Therapeutic Activities:                                      HEP: Exercises  Exercises  Seated Wrist Extension PROM - 2 x daily - 7 x weekly - 5 reps - 15 second hold  Seated Wrist Prayer Stretch - 2 x daily - 7 x weekly - 5 sets - 15 second hold  Seated Wrist Flexion with Overpressure - 2 x daily - 7 x weekly - 10 reps - 15 second hold  Seated Finger Composite Flexion Stretch - 2 x daily - 7 x weekly - 3 reps - 2 minuessccond hold  Wrist Pronation Stretch - 2 x daily - 7 x weekly - 5 sets - 15 second hold  Seated Wrist Supination Stretch - 2 x daily - 7 x weekly - 5 sets - 15 second hold    Added 12/10/2021: Emphasize 5 to 10 min holds on steady finger flexion stretch (MCP/IP to palm). OK to work on thumb-finger pinch using clothespin squeeze. 1/5/22: hand compression garments issued ([employer]), wear as much as tolerated, including at night. 2/7/22: start static hold full fist stretches x 5 min, 3x/day, and MCP flexion stretches w/ holds x 5 min. [employer]  Treatment/Session Summary:    · Response to Treatment: ROM improvements with open fist: able to stretch to touch finger pads to thenar eminence at index and middle finger for first time since starting PT.   · Communication/Consultation:  None today  · Equipment provided today:  None today   · Recommendations/Intent for next treatment session: Emphasis on a/p/rrom to regain flexor tendon/finger flexion RODRIGEZ and thumb opposition for normal /grasp, pronation/supination. Manual therapy for joint and tendon mobility.      Total Treatment Billable Duration:  43 minutes  PT Patient Time In/Time Out  Time In: 2465  Time Out: [place] [address], PT    Future Appointments   Date Time Provider Kamille Reis   3/30/2022 11:00 AM Moira Carrillo, PT [facility]   4/4/2022 11:00 AM Moira Carrillo, PT [facility]   4/6/2022 11:00 AM Moira Carrillo, PT [facility]   4/14/2022 11:00 AM Moira Carrillo, PT [facility]   4/18/2022 11:00 AM Moira Carrillo, PT [facility]   4/20/2022 11:00 AM Moira Carrillo, PT [facility]   4/25/2022 11:00 AM Moira Carrillo, PT [facility]   4/27/2022 11:00 AM [facility]

## 2022-03-30 ENCOUNTER — HOSPITAL ENCOUNTER (OUTPATIENT)
Dept: PHYSICAL THERAPY | Age: 82
Discharge: HOME | End: 2022-03-30
Payer: COMMERCIAL

## 2022-03-30 PROCEDURE — 97110 THERAPEUTIC EXERCISES: CPT

## 2022-03-30 NOTE — PROGRESS NOTES
Juan Black  : 1940  Primary: Harris Campos [facility]*  Secondary:  [address] @ [address]  [address] [place] [address].  [phone]   [phone]      OUTPATIENT PHYSICAL THERAPY:  Daily Treatment Note  3/30/2022   ICD-10: Treatment Diagnosis: Pain in right wrist (M25.531), Stiffness of right wrist, not elsewhere classified (M25.631), Pain in left wrist (M25.532), Stiffness of left wrist, not elsewhere classified (M25.632), Stiffness of right hand, not elsewhere classified (M25.641) and Stiffness of left hand, not elsewhere classified (M25.642)  MEDICAL/REFERRING DIAGNOSIS:  Status post bilateral distal radius fractures   TREATMENT PLAN:  Effective Dates: 3/7/22 to 22 (60 days). Frequency/Duration: 2 times a week for 60 Days  GOALS: (Goals have been discussed and agreed upon with patient.)  Short-Term Functional Goals: Time Frame: 4 weeks:   1. Independent performance of home exercise program (MET)  2. Reduce R hand edema to normal levels to allow normal finger to palm fist motions (MET)  3. Increase R wrist/hand ROM's to 75% or better of normal  In all planes of motion (MET)  Discharge Goals: Time Frame: 12 weeks  1. Pt to demonstrate at least 35#   Strength L/R for normal weight carrying (progressing)  2. Pt to demonstrate L/R wrist/ hand ROM's 85% or better of normal all planes (progressing)  3. Improve Quick-DASH scores to 15/55 or better to reflect return to normal ADL function (progressing)  Rehabilitation Potential For Stated Goals: Good  _______________________________________________________________________  Pre-treatment Symptoms/Complaints: Pt states that her hand stays mobile for about 20 hours after she's been to therapy, gets stiff after this.   Pain: Initial: 3/10 Post Session:  No increase/10   Medications Last Reviewed:  3/30/2022  Updated Objective Findings:    Observation/Orthostatic Postural Assessment: 3/9/22: minimal R digit swelling. Palpation:    Diffuse right digital swelling, mild dorsal R hand swelling (12/29/2021)  ROM:    Left : Pt demonstrates ability to perform full left hook, open and full fists. AROM's : wrist flexion = 40 deg, extension = 45 deg, pronation/supination 75% of full AROM, Ulnar deviation = 18 deg, radial deviation = 20 deg. Right: Passive Lonzell Fujita digit ROM's: 2: passive RODRIGEZ now 75%. (12/22/2021), MCP flexion at index = 90 deg (1/22/22) , Thumb IP = full extension, 50% flexion, MCP = full extension, 50% flexion. CMC: 25% flexion. CMC radial abduction 40 deg. Wrist flexion P/AROM = 55 deg (1/5/2022), extension A/PROM = 40 deg/48 deg  Deg (1/5/22), Ulnar deviation = 15 deg PROM, radial deviation = to 15 deg. R forearm active/passive. pronation/supination  = ~ 60 deg to 75 deg (12/29/2021). Huiafwflzu-go-[id number] palmar crease measures: R hand: index and ring 4.5 cm (1/31/22)  Strength:   Left:  digits all 4-/5, Wrist flex/ext all 4/5  Right: digits all 3-/5, wrist flex/ext 4-/5   (1/19/22): 3rd run dynamometer: left = 32 #, right = 15#. 1/28/22: right = 15#    2/7/22: Fingernail to palm finger flexion measures R hand: Index = 3.5cm, Middle: 3.5 cm, Ring: 3.4 cm, 5th finger: 3.4 cm. Able to flex each MCP at digits 2-5 to 90 deg, and at each PIP to 90 deg, each w/stiff end feel. Able to lateral pinch with index to middle phalanx and tip, able to pinch to tip of middle digit with effort.  strength at 3rd rung = 17#. Quick-DASH score: 29/55  2/9/22: Pre treatment:   R hand ROM's: MCP flexion# 2 = 40 deg, #3 = 40 deg, # 4 = 40 deg, #5 = 30 deg. PIP flexion: #2 = 40 deg, # 3 = 50 deg, # 4 = 52 deg, # 5 = 50 deg,   DIP fleixon: #2 = 40 deg, # 3 = 30 deg, # 4 = 30 deg, # 5 = 28 deg. Fingernail to proximal palmar crease distances: #2 = 6cm, # 3 = 6 cm, # 4 = 5.5 cm, # 5 = 4.3 cm.    Post treatment:   MCP flexion : #2 = 60 deg, # 3 = 55 deg  PIP fleixon: #2 = 88 deg, # 3 = 70 deg.   2/16/22:  Pre treatment:   R hand ROM's: MCP flexion# 2 = 54 deg, #3 = 44 deg, # 4 = 32 deg, #5 = 26 deg. PIP flexion: #2 = 40 deg, # 3 = 50 deg, # 4 = 55 deg, # 5 = 50 deg,   DIP fleixon: #2 = 32 deg, # 3 = 32 deg, # 4 = 42 deg, # 5 = 44 deg. Post treatment fingertipe to prox. Palmar crease distance: index = 4.6cm, middle = 4.6 cm. : #2: left = 16#, right = 26#, #4: left = 18#, right = 32#.   2/25/22: Wrist PROM's : flexion = 52 deg, extn = 38 deg, radial dev. = 12 deg, ulnar dev = 25 deg. Quick DASH = 32/55. 3/7/22: PIP flexion PROM: 2nd = 80 deg, 3rd = 82 deg, 4th = 82 deg, 5th = 85 deg.   3/11/22: Fingertip to prox. Palmar crease: Index = 3.5cm, Middle = 3.2cm    3/18/22: MCP flexion: #2: 70 deg A, 85 deg P, #3: 60 deg A, 80 deg P, # 4: 77 deg P  PIP flexion: #2: 65 deg A, 75 deg P, #3: 75 deg A, 88 deg P, #4: 80 deg P  Full fist fingertip to palm distance: #2: 4.7 cm A, 4.0cm P, #3 5.0cm A, 3.0 cm P, # 5: 4.9 cm A, 3.3 cm P. Wrist AROM/PROM R: flexion : 40 deg A, 43 deg. P, Extension: 40 deg A, 45 deg P. Quick Dash: 25/55  3/25/22: R : # 2 = 15#, # 4 = 19#. TREATMENT:   THERAPEUTIC ACTIVITY: ( see below for minutes): Therapeutic activities per grid below to improve mobility, strength, balance and coordination. Required minimal visual, verbal, manual and tactile cues to improve independence and safety with daily activities . THERAPEUTIC EXERCISE: (see below for minutes):  Exercises per grid below to improve mobility, strength, balance and coordination. Required minimal verbal and manual cues to promote proper body alignment, promote proper body posture and promote proper body mechanics. Progressed resistance, range, repetitions and complexity of movement as indicated.   MANUAL THERAPY: (see below for minutes): Joint mobilization and Soft tissue mobilization was utilized and necessary because of the patient's restricted joint motion, painful spasm, loss of articular motion and restricted motion of soft tissue. MODALITIES: (see below for minutes):      to decrease pain    SELF CARE: (see below for minutes): Procedure(s) (per grid) utilized to improve and/or restore self-care/home management as related to dressing, bathing and grooming. Required minimal verbal cueing to facilitate activities of daily living skills and compensatory activities.      Date: 3/23/22 (visit 50) 3/25/22 (visit 51) 3/28/22 (visit 52) 3/30/22 (visit 48)      Modalities:                                    Therapeutic Exercise: 38 min 42 min 43 min 43 min      Thumb CMC flexion stretch x 2 min, abd x 1 min, IP/MCP flexion stretch w/ opposition to 5th x 2 min Passive stretching: MCP flexion 2-5, MCP and PIP for open fist, all MCP/IP for full fist Finger extension stretching: at PIP's 2-5 x 2 min Hook fist RROM: fingertips into tabletop: 3 x 20      Isolated and combined flexion stretch/holds at MCP/PIP/DIP digits 2-5 x 10 min Flexor tendon glides: Open fist and hook fist w/ AROM and light resistance at phalanges: 3 x 15  Finger flexion stretching: static holds at MCP's, combined flexion w/  Open fist and hook fists 2-5 : 30\" holds x 15 min Finger flexor stretching: Isolated MCPs', Isolated PIP's, isolated DIP's w/ static holds x 2 min each, Hook fist combined stretch and open fist combined stretch: 20 min      Flexor tendon glides w/ assist; hook fist 2 x 15, full fist  X 30  R Thumb PROM: thumb opposition at [facility], thumb abd, flexion at MCP, thumb circumductions : 4 min R thumb end range flexion, abduction and opposition stretches: 3 min Flexor tendon glides: hook fist 2 x 15, open fist x 15      Resisted MCP/PIP extension x 30  Putty: red index/middle pull aparts x 10, index/middle twists x 10 Flexor tendon gliding (hook fist, open fist, 20 each Theraputty: thumb flexions x 15, Hook fists x 15      R wrist flexion curls x 2 x 30, extension 2 x 30 @ 3# R PIP finger flexion static stretches 2-4 x 3 min Green therab-ball squeezes x 20  Finger extension stretching x 2 x 30\" (2-5 at PIP's, full finger 2-5 x 1 min      Pron/sup twists w/ green therabar x 20 Biceps curls R x 40 w/5# Manl resist; 4-way ( wrist flex, ext, rad dev, uln dev): 2 x 15 each Manl resist; 4-way ( wrist flex, ext, rad dev, uln dev): 2 x 15 each      Pron/sup bends bilat w/ therabar x 30 each Wrist curls (flexion, extension), 30 each w/ 3# Passive R wrist extnsion stretch x 1 min Biceps curls: half range x 15 w/ 6#, 20 w/ 5#      R elbow curls x 30 w/5#. R wrist extension stretching x 3 min Theraputty: index/middle pinch pull x 10, hook fists x 20, thumb flexions x 15 (red putty)       Theraweb green: wide finger flexion at MCPs x 30 , dip's x 30          Red putty pinch /pulls x 15                           Proprioceptive Activities:                                    Manual Therapy:                                             Therapeutic Activities:                                      HEP: Exercises  Exercises  Seated Wrist Extension PROM - 2 x daily - 7 x weekly - 5 reps - 15 second hold  Seated Wrist Prayer Stretch - 2 x daily - 7 x weekly - 5 sets - 15 second hold  Seated Wrist Flexion with Overpressure - 2 x daily - 7 x weekly - 10 reps - 15 second hold  Seated Finger Composite Flexion Stretch - 2 x daily - 7 x weekly - 3 reps - 2 minuessccond hold  Wrist Pronation Stretch - 2 x daily - 7 x weekly - 5 sets - 15 second hold  Seated Wrist Supination Stretch - 2 x daily - 7 x weekly - 5 sets - 15 second hold    Added 12/10/2021: Emphasize 5 to 10 min holds on steady finger flexion stretch (MCP/IP to palm). OK to work on thumb-finger pinch using clothespin squeeze. 1/5/22: hand compression garments issued (incrediwear), wear as much as tolerated, including at night. 2/7/22: start static hold full fist stretches x 5 min, 3x/day, and MCP flexion stretches w/ holds x 5 min.      [facility]  Treatment/Session Summary:    · Response to Treatment: More emphasis on resisted training to facilitate full flexion, Pt continuing to rebound with partial loss of ROM catina time of next visit but still making progress overall. · Communication/Consultation:  None today  · Equipment provided today:  None today   · Recommendations/Intent for next treatment session: Emphasis on a/p/rrom to regain flexor tendon/finger flexion RODRIGEZ and thumb opposition for normal /grasp, pronation/supination. Manual therapy for joint and tendon mobility.      Total Treatment Billable Duration:  43 minutes  PT Patient Time In/Time Out  Time In: 1100  Time Out: Suraj Borden PT    Future Appointments   Date Time Provider Kamille Reis   4/4/2022 11:00 AM [facility]   4/6/2022 11:00 AM Fidelina Vasquez, PT [facility]   4/14/2022 11:00 AM Fidelina Vasquez, PT [facility]   4/18/2022 11:00 AM Fidelina Vasquez, PT [facility]   4/20/2022 11:00 AM Fidelina Vasquez, PT [facility]   4/25/2022 11:00 AM Fidelina Vasquez, PT [facility]   4/27/2022 11:00 AM Jonathan Marx, PT [facility]

## 2022-04-04 ENCOUNTER — HOSPITAL ENCOUNTER (OUTPATIENT)
Dept: PHYSICAL THERAPY | Age: 82
Discharge: HOME | End: 2022-04-04
Payer: COMMERCIAL

## 2022-04-04 PROCEDURE — 97110 THERAPEUTIC EXERCISES: CPT

## 2022-04-04 NOTE — PROGRESS NOTES
Cherri Black  : 1940  Primary: Harris Poe [facility] [place] [facility]*  Secondary:  [facility] @ [address], [address] [place] [address]  [phone]   [phone]      OUTPATIENT PHYSICAL THERAPY:  Daily Treatment Note  2022   ICD-10: Treatment Diagnosis: Pain in right wrist (M25.531), Stiffness of right wrist, not elsewhere classified (M25.631), Pain in left wrist (M25.532), Stiffness of left wrist, not elsewhere classified (M25.632), Stiffness of right hand, not elsewhere classified (M25.641) and Stiffness of left hand, not elsewhere classified (M25.642)  MEDICAL/REFERRING DIAGNOSIS:  Status post bilateral distal radius fractures   TREATMENT PLAN:  Effective Dates: 3/7/22 to 22 (60 days). Frequency/Duration: 2 times a week for 60 Days  GOALS: (Goals have been discussed and agreed upon with patient.)  Short-Term Functional Goals: Time Frame: 4 weeks:   1. Independent performance of home exercise program (MET)  2. Reduce R hand edema to normal levels to allow normal finger to palm fist motions (MET)  3. Increase R wrist/hand ROM's to 75% or better of normal  In all planes of motion (MET)  Discharge Goals: Time Frame: 12 weeks  1. Pt to demonstrate at least 35#   Strength L/R for normal weight carrying (progressing)  2. Pt to demonstrate L/R wrist/ hand ROM's 85% or better of normal all planes (progressing)  3. Improve Quick-DASH scores to 15/55 or better to reflect return to normal ADL function (progressing)  Rehabilitation Potential For Stated Goals: Good  _______________________________________________________________________  Pre-treatment Symptoms/Complaints: Pt kept busy over the weekend, using her hand more - cooking, typing. Notes that she can perform a small amount of handwriting now using an awkward  on the pen.    Pain: Initial: 3/10 Post Session:  No increase/10   Medications Last Reviewed:  2022  Updated Objective Findings: Observation/Orthostatic Postural Assessment:    3/9/22: minimal R digit swelling. Palpation:    Diffuse right digital swelling, mild dorsal R hand swelling (12/29/2021)  ROM:    Left : Pt demonstrates ability to perform full left hook, open and full fists. AROM's : wrist flexion = 40 deg, extension = 45 deg, pronation/supination 75% of full AROM, Ulnar deviation = 18 deg, radial deviation = 20 deg. Right: Passive Maggie Lemmings digit ROM's: 2: passive RODRIGEZ now 75%. (12/22/2021), MCP flexion at index = 90 deg (1/22/22) , Thumb IP = full extension, 50% flexion, MCP = full extension, 50% flexion. CMC: 25% flexion. CMC radial abduction 40 deg. Wrist flexion P/AROM = 55 deg (1/5/2022), extension A/PROM = 40 deg/48 deg  Deg (1/5/22), Ulnar deviation = 15 deg PROM, radial deviation = to 15 deg. R forearm active/passive. pronation/supination  = ~ 60 deg to 75 deg (12/29/2021). Hkywmnoygk-[id number]-ffaiwqyc palmar crease measures: R hand: index and ring 4.5 cm (1/31/22)  Strength:   Left:  digits all 4-/5, Wrist flex/ext all 4/5  Right: digits all 3-/5, wrist flex/ext 4-/5   (1/19/22): 3rd run dynamometer: left = 32 #, right = 15#. 1/28/22: right = 15#    2/7/22: Fingernail to palm finger flexion measures R hand: Index = 3.5cm, Middle: 3.5 cm, Ring: 3.4 cm, 5th finger: 3.4 cm. Able to flex each MCP at digits 2-5 to 90 deg, and at each PIP to 90 deg, each w/stiff end feel. Able to lateral pinch with index to middle phalanx and tip, able to pinch to tip of middle digit with effort.  strength at 3rd rung = 17#. Quick-DASH score: 29/55  2/9/22: Pre treatment:   R hand ROM's: MCP flexion# 2 = 40 deg, #3 = 40 deg, # 4 = 40 deg, #5 = 30 deg. PIP flexion: #2 = 40 deg, # 3 = 50 deg, # 4 = 52 deg, # 5 = 50 deg,   DIP fleixon: #2 = 40 deg, # 3 = 30 deg, # 4 = 30 deg, # 5 = 28 deg. Fingernail to proximal palmar crease distances: #2 = 6cm, # 3 = 6 cm, # 4 = 5.5 cm, # 5 = 4.3 cm.    Post treatment:   MCP flexion : #2 = 60 deg, # 3 = 55 deg  PIP fleixon: #2 = 88 deg, # 3 = 70 deg. 2/16/22:  Pre treatment:   R hand ROM's: MCP flexion# 2 = 54 deg, #3 = 44 deg, # 4 = 32 deg, #5 = 26 deg. PIP flexion: #2 = 40 deg, # 3 = 50 deg, # 4 = 55 deg, # 5 = 50 deg,   DIP fleixon: #2 = 32 deg, # 3 = 32 deg, # 4 = 42 deg, # 5 = 44 deg. Post treatment fingertipe to prox. Palmar crease distance: index = 4.6cm, middle = 4.6 cm. : #2: left = 16#, right = 26#, #4: left = 18#, right = 32#.   2/25/22: Wrist PROM's : flexion = 52 deg, extn = 38 deg, radial dev. = 12 deg, ulnar dev = 25 deg. Quick DASH = 32/55. 3/7/22: PIP flexion PROM: 2nd = 80 deg, 3rd = 82 deg, 4th = 82 deg, 5th = 85 deg.   3/11/22: Fingertip to prox. Palmar crease: Index = 3.5cm, Middle = 3.2cm    3/18/22: MCP flexion: #2: 70 deg A, 85 deg P, #3: 60 deg A, 80 deg P, # 4: 77 deg P  PIP flexion: #2: 65 deg A, 75 deg P, #3: 75 deg A, 88 deg P, #4: 80 deg P  Full fist fingertip to palm distance: #2: 4.7 cm A, 4.0cm P, #3 5.0cm A, 3.0 cm P, # 5: 4.9 cm A, 3.3 cm P. Wrist AROM/PROM R: flexion : 40 deg A, 43 deg. P, Extension: 40 deg A, 45 deg P. Quick Dash: 25/55  3/25/22: R : # 2 = 15#, # 4 = 19#. TREATMENT:   THERAPEUTIC ACTIVITY: ( see below for minutes): Therapeutic activities per grid below to improve mobility, strength, balance and coordination. Required minimal visual, verbal, manual and tactile cues to improve independence and safety with daily activities . THERAPEUTIC EXERCISE: (see below for minutes):  Exercises per grid below to improve mobility, strength, balance and coordination. Required minimal verbal and manual cues to promote proper body alignment, promote proper body posture and promote proper body mechanics. Progressed resistance, range, repetitions and complexity of movement as indicated.   MANUAL THERAPY: (see below for minutes): Joint mobilization and Soft tissue mobilization was utilized and necessary because of the patient's restricted joint motion, painful spasm, loss of articular motion and restricted motion of soft tissue. MODALITIES: (see below for minutes):      to decrease pain    SELF CARE: (see below for minutes): Procedure(s) (per grid) utilized to improve and/or restore self-care/home management as related to dressing, bathing and grooming. Required minimal verbal cueing to facilitate activities of daily living skills and compensatory activities.      Date: 3/23/22 (visit 50) 3/25/22 (visit 51) 3/28/22 (visit 52) 3/30/22 (visit 48)  4/4/22 ( visit 47)    Modalities:                                    Therapeutic Exercise: 38 min 42 min 43 min 43 min 41 min     Thumb CMC flexion stretch x 2 min, abd x 1 min, IP/MCP flexion stretch w/ opposition to 5th x 2 min Passive stretching: MCP flexion 2-5, MCP and PIP for open fist, all MCP/IP for full fist Finger extension stretching: at PIP's 2-5 x 2 min Hook fist RROM: fingertips into tabletop: 3 x 20 Hand AROM /extensions x 30      Isolated and combined flexion stretch/holds at MCP/PIP/DIP digits 2-5 x 10 min Flexor tendon glides: Open fist and hook fist w/ AROM and light resistance at phalanges: 3 x 15  Finger flexion stretching: static holds at MCP's, combined flexion w/  Open fist and hook fists 2-5 : 30\" holds x 15 min Finger flexor stretching: Isolated MCPs', Isolated PIP's, isolated DIP's w/ static holds x 2 min each, Hook fist combined stretch and open fist combined stretch: 20 min Finger flexor stretching: hook and full fist w/ sustained holds, individual MCP and PIP stretching at digits 2-3-4 : 20 minb     Flexor tendon glides w/ assist; hook fist 2 x 15, full fist  X 30  R Thumb PROM: thumb opposition at [facility], thumb abd, flexion at MCP, thumb circumductions : 4 min R thumb end range flexion, abduction and opposition stretches: 3 min Flexor tendon glides: hook fist 2 x 15, open fist x 15 Manl resist full fist tendon glides at digits 2-5 x 10     Resisted MCP/PIP extension x 30  Putty: red index/middle pull aparts x 10, index/middle twists x 10 Flexor tendon gliding (hook fist, open fist, 20 each Theraputty: thumb flexions x 15, Hook fists x 15 Finger extension stretchin min at digits 2-3-4 (PIP's)     R wrist flexion curls x 2 x 30, extension 2 x 30 @ 3# R PIP finger flexion static stretches 2-4 x 3 min Green therab-ball squeezes x 20  Finger extension stretching x 2 x 30\" (2-5 at PIP's, full finger 2-5 x 1 min Manl resist; 4-way ( wrist flex, ext, rad dev, uln dev): 3 x 15 each     Pron/sup twists w/ green therabar x 20 Biceps curls R x 40 w/5# Manl resist; 4-way ( wrist flex, ext, rad dev, uln dev): 2 x 15 each Manl resist; 4-way ( wrist flex, ext, rad dev, uln dev): 2 x 15 each Therabar red: pronations x 20, supinations x 20, tug of war x 10     Pron/sup bends bilat w/ therabar x 30 each Wrist curls (flexion, extension), 30 each w/ 3# Passive R wrist extnsion stretch x 1 min Biceps curls: half range x 15 w/ 6#, 20 w/ 5#      R elbow curls x 30 w/5#.   R wrist extension stretching x 3 min Theraputty: index/middle pinch pull x 10, hook fists x 20, thumb flexions x 15 (red putty)       Theraweb green: wide finger flexion at MCPs x 30 , dip's x 30          Red putty pinch /pulls x 15                           Proprioceptive Activities:                                    Manual Therapy:                                             Therapeutic Activities:                                      HEP: Exercises  Exercises  Seated Wrist Extension PROM - 2 x daily - 7 x weekly - 5 reps - 15 second hold  Seated Wrist Prayer Stretch - 2 x daily - 7 x weekly - 5 sets - 15 second hold  Seated Wrist Flexion with Overpressure - 2 x daily - 7 x weekly - 10 reps - 15 second hold  Seated Finger Composite Flexion Stretch - 2 x daily - 7 x weekly - 3 reps - 2 minuessccond hold  Wrist Pronation Stretch - 2 x daily - 7 x weekly - 5 sets - 15 second hold  Seated Wrist Supination Stretch - 2 x daily - 7 x weekly - 5 sets - 15 second hold    Added 12/10/2021: Emphasize 5 to 10 min holds on steady finger flexion stretch (MCP/IP to palm). OK to work on thumb-finger pinch using clothespin squeeze. 1/5/22: hand compression garments issued (incrediwear), wear as much as tolerated, including at night. 2/7/22: start static hold full fist stretches x 5 min, 3x/day, and MCP flexion stretches w/ holds x 5 min. [facility] Portal  Treatment/Session Summary:    · Response to Treatment: Less ROM loss than expected since last visit, but several minutes are required to stretch fingers down to make open fist against palm at index and middle digits   · Communication/Consultation:  None today  · Equipment provided today:  None today   · Recommendations/Intent for next treatment session: Emphasis on a/p/rrom to regain flexor tendon/finger flexion RODRIGEZ and thumb opposition for normal /grasp, pronation/supination. Manual therapy for joint and tendon mobility.      Total Treatment Billable Duration:  41 minutes  PT Patient Time In/Time Out  Time In: 8204  Time Out: 250 [facility], PT    Future Appointments   Date Time Provider Kamille Reis   4/6/2022 11:00 AM Kaley Wu PT [facility]   4/14/2022 11:00 AM Kaley Wu PT [facility]   4/18/2022 11:00 AM Kaley Wu, PT [facility]   4/20/2022 11:00 AM Kaley Wu PT [facility]   4/25/2022 11:00 AM Kaley Wu PT [facility]   4/27/2022 11:00 AM Jose Carlos Barragan, PT [facility]

## 2022-04-06 ENCOUNTER — HOSPITAL ENCOUNTER (OUTPATIENT)
Dept: PHYSICAL THERAPY | Age: 82
Discharge: HOME | End: 2022-04-06
Payer: COMMERCIAL

## 2022-04-06 PROCEDURE — 97110 THERAPEUTIC EXERCISES: CPT

## 2022-04-06 NOTE — PROGRESS NOTES
Maricel Black  : 1940  Primary: [facility] Briana*  Secondary:  [facility] @ [address], [address] [place] [address]  [phone]   [phone]      OUTPATIENT PHYSICAL THERAPY:  Daily Treatment Note  2022   ICD-10: Treatment Diagnosis: Pain in right wrist (M25.531), Stiffness of right wrist, not elsewhere classified (M25.631), Pain in left wrist (M25.532), Stiffness of left wrist, not elsewhere classified (M25.632), Stiffness of right hand, not elsewhere classified (M25.641) and Stiffness of left hand, not elsewhere classified (M25.642)  MEDICAL/REFERRING DIAGNOSIS:  Status post bilateral distal radius fractures   TREATMENT PLAN:  Effective Dates: 3/7/22 to 22 (60 days). Frequency/Duration: 2 times a week for 60 Days  GOALS: (Goals have been discussed and agreed upon with patient.)  Short-Term Functional Goals: Time Frame: 4 weeks:   1. Independent performance of home exercise program (MET)  2. Reduce R hand edema to normal levels to allow normal finger to palm fist motions (MET)  3. Increase R wrist/hand ROM's to 75% or better of normal  In all planes of motion (MET)  Discharge Goals: Time Frame: 12 weeks  1. Pt to demonstrate at least 35#   Strength L/R for normal weight carrying (progressing)  2. Pt to demonstrate L/R wrist/ hand ROM's 85% or better of normal all planes (progressing)  3. Improve Quick-DASH scores to 15/55 or better to reflect return to normal ADL function (progressing)  Rehabilitation Potential For Stated Goals: Good  _______________________________________________________________________  Pre-treatment Symptoms/Complaints: Pt stating that index tip-thumb pinch is working, flexing the middle and ring fingers continues to be limited - stiffness in the palm hindering this. Is able to handwrite but it's slow and difficult.    Pain: Initial: 3/10 Post Session:  No increase/10   Medications Last Reviewed: 4/6/2022  Updated Objective Findings:    Observation/Orthostatic Postural Assessment:    3/9/22: minimal R digit swelling. Palpation:    Diffuse right digital swelling, mild dorsal R hand swelling (12/29/2021)  ROM:    Left : Pt demonstrates ability to perform full left hook, open and full fists. AROM's : wrist flexion = 40 deg, extension = 45 deg, pronation/supination 75% of full AROM, Ulnar deviation = 18 deg, radial deviation = 20 deg. Right: Passive [place] digit ROM's: 2: passive RODRIGEZ now 75%. (12/22/2021), MCP flexion at index = 90 deg (1/22/22) , Thumb IP = full extension, 50% flexion, MCP = full extension, 50% flexion. CMC: 25% flexion. CMC radial abduction 40 deg. Wrist flexion P/AROM = 55 deg (1/5/2022), extension A/PROM = 40 deg/48 deg  Deg (1/5/22), Ulnar deviation = 15 deg PROM, radial deviation = to 15 deg. R forearm active/passive. pronation/supination  = ~ 60 deg to 75 deg (12/29/2021). Cqaunzwrez-pe-vdflsotj palmar crease measures: R hand: index and ring 4.5 cm (1/31/22)  Strength:   Left:  digits all 4-/5, Wrist flex/ext all 4/5  Right: digits all 3-/5, wrist flex/ext 4-/5   (1/19/22): 3rd run dynamometer: left = 32 #, right = 15#. 1/28/22: right = 15#    2/7/22: Fingernail to palm finger flexion measures R hand: Index = 3.5cm, Middle: 3.5 cm, Ring: 3.4 cm, 5th finger: 3.4 cm. Able to flex each MCP at digits 2-5 to 90 deg, and at each PIP to 90 deg, each w/stiff end feel. Able to lateral pinch with index to middle phalanx and tip, able to pinch to tip of middle digit with effort.  strength at 3rd rung = 17#. Quick-DASH score: 29/55  2/9/22: Pre treatment:   R hand ROM's: MCP flexion# 2 = 40 deg, #3 = 40 deg, # 4 = 40 deg, #5 = 30 deg. PIP flexion: #2 = 40 deg, # 3 = 50 deg, # 4 = 52 deg, # 5 = 50 deg,   DIP fleixon: #2 = 40 deg, # 3 = 30 deg, # 4 = 30 deg, # 5 = 28 deg. Fingernail to proximal palmar crease distances: #2 = 6cm, # 3 = 6 cm, # 4 = 5.5 cm, # 5 = 4.3 cm.    Post treatment:   MCP flexion : #2 = 60 deg, # 3 = 55 deg  PIP fleixon: #2 = 88 deg, # 3 = 70 deg. 2/16/22:  Pre treatment:   R hand ROM's: MCP flexion# 2 = 54 deg, #3 = 44 deg, # 4 = 32 deg, #5 = 26 deg. PIP flexion: #2 = 40 deg, # 3 = 50 deg, # 4 = 55 deg, # 5 = 50 deg,   DIP fleixon: #2 = 32 deg, # 3 = 32 deg, # 4 = 42 deg, # 5 = 44 deg. Post treatment fingertipe to prox. Palmar crease distance: index = 4.6cm, middle = 4.6 cm. : #2: left = 16#, right = 26#, #4: left = 18#, right = 32#.   2/25/22: Wrist PROM's : flexion = 52 deg, extn = 38 deg, radial dev. = 12 deg, ulnar dev = 25 deg. Quick DASH = 32/55. 3/7/22: PIP flexion PROM: 2nd = 80 deg, 3rd = 82 deg, 4th = 82 deg, 5th = 85 deg.   3/11/22: Fingertip to prox. Palmar crease: Index = 3.5cm, Middle = 3.2cm    3/18/22: MCP flexion: #2: 70 deg A, 85 deg P, #3: 60 deg A, 80 deg P, # 4: 77 deg P  PIP flexion: #2: 65 deg A, 75 deg P, #3: 75 deg A, 88 deg P, #4: 80 deg P  Full fist fingertip to palm distance: #2: 4.7 cm A, 4.0cm P, #3 5.0cm A, 3.0 cm P, # 5: 4.9 cm A, 3.3 cm P. Wrist AROM/PROM R: flexion : 40 deg A, 43 deg. P, Extension: 40 deg A, 45 deg P. Quick Dash: 25/55  3/25/22: R : # 2 = 15#, # 4 = 19#. TREATMENT:   THERAPEUTIC ACTIVITY: ( see below for minutes): Therapeutic activities per grid below to improve mobility, strength, balance and coordination. Required minimal visual, verbal, manual and tactile cues to improve independence and safety with daily activities . THERAPEUTIC EXERCISE: (see below for minutes):  Exercises per grid below to improve mobility, strength, balance and coordination. Required minimal verbal and manual cues to promote proper body alignment, promote proper body posture and promote proper body mechanics. Progressed resistance, range, repetitions and complexity of movement as indicated.   MANUAL THERAPY: (see below for minutes): Joint mobilization and Soft tissue mobilization was utilized and necessary because of the patient's restricted joint motion, painful spasm, loss of articular motion and restricted motion of soft tissue. MODALITIES: (see below for minutes):      to decrease pain    SELF CARE: (see below for minutes): Procedure(s) (per grid) utilized to improve and/or restore self-care/home management as related to dressing, bathing and grooming. Required minimal verbal cueing to facilitate activities of daily living skills and compensatory activities.      Date: 3/23/22 (visit 50) 3/25/22 (visit 51) 3/28/22 (visit 52) 3/30/22 (visit 48)  4/4/22 ( visit 47) 4/6/22 (visit 54)   Modalities:                                    Therapeutic Exercise: 38 min 42 min 43 min 43 min 41 min 43 min    Thumb CMC flexion stretch x 2 min, abd x 1 min, IP/MCP flexion stretch w/ opposition to 5th x 2 min Passive stretching: MCP flexion 2-5, MCP and PIP for open fist, all MCP/IP for full fist Finger extension stretching: at PIP's 2-5 x 2 min Hook fist RROM: fingertips into tabletop: 3 x 20 Hand AROM /extensions x 30  Flexor stretching; MCP's at2-3-4, Composite flexion at MCP-PIP-DIP, hook fist and full fists - all with sustained 20-30\" holds    Isolated and combined flexion stretch/holds at MCP/PIP/DIP digits 2-5 x 10 min Flexor tendon glides: Open fist and hook fist w/ AROM and light resistance at phalanges: 3 x 15  Finger flexion stretching: static holds at MCP's, combined flexion w/  Open fist and hook fists 2-5 : 30\" holds x 15 min Finger flexor stretching: Isolated MCPs', Isolated PIP's, isolated DIP's w/ static holds x 2 min each, Hook fist combined stretch and open fist combined stretch: 20 min Finger flexor stretching: hook and full fist w/ sustained holds, individual MCP and PIP stretching at digits 2-3-4 : 20 minb Manual resist full finger flexion  X 30 (fingers 2-5)    Flexor tendon glides w/ assist; hook fist 2 x 15, full fist  X 30  R Thumb PROM: thumb opposition at [facility], thumb abd, flexion at MCP, thumb circumductions : 4 min R thumb end range flexion, abduction and opposition stretches: 3 min Flexor tendon glides: hook fist 2 x 15, open fist x 15 Manl resist full fist tendon glides at digits 2-5 x 10 Lumbrical tendon glide AROM's x 2  X 10    Resisted MCP/PIP extension x 30  Putty: red index/middle pull aparts x 10, index/middle twists x 10 Flexor tendon gliding (hook fist, open fist, 20 each Theraputty: thumb flexions x 15, Hook fists x 15 Finger extension stretchin min at digits 2-3-4 (PIP's) Thumb abductions x 20     R wrist flexion curls x 2 x 30, extension 2 x 30 @ 3# R PIP finger flexion static stretches 2-4 x 3 min Green therab-ball squeezes x 20  Finger extension stretching x 2 x 30\" (2-5 at PIP's, full finger 2-5 x 1 min Manl resist; 4-way ( wrist flex, ext, rad dev, uln dev): 3 x 15 each Thumb flexion stretch x 1 min (self)    Pron/sup twists w/ green therabar x 20 Biceps curls R x 40 w/5# Manl resist; 4-way ( wrist flex, ext, rad dev, uln dev): 2 x 15 each Manl resist; 4-way ( wrist flex, ext, rad dev, uln dev): 2 x 15 each Therabar red: pronations x 20, supinations x 20, tug of war x 10 HEP instruction - see list below    Pron/sup bends bilat w/ therabar x 30 each Wrist curls (flexion, extension), 30 each w/ 3# Passive R wrist extnsion stretch x 1 min Biceps curls: half range x 15 w/ 6#, 20 w/ 5#  Wrist extension stretch w/ pronation x 1 min    R elbow curls x 30 w/5#.   R wrist extension stretching x 3 min Theraputty: index/middle pinch pull x 10, hook fists x 20, thumb flexions x 15 (red putty)   Putty hook fists x 10     Theraweb green: wide finger flexion at MCPs x 30 , dip's x 30      Washcloth full fists x 15    Red putty pinch /pulls x 15                           Proprioceptive Activities:                                    Manual Therapy:                                             Therapeutic Activities:                                      HEP: Exercises  Exercises  Seated Wrist Extension PROM - 2 x daily - 7 x weekly - 5 reps - 15 second hold  Seated Wrist Prayer Stretch - 2 x daily - 7 x weekly - 5 sets - 15 second hold  Seated Wrist Flexion with Overpressure - 2 x daily - 7 x weekly - 10 reps - 15 second hold  Seated Finger Composite Flexion Stretch - 2 x daily - 7 x weekly - 3 reps - 2 minuessccond hold  Wrist Pronation Stretch - 2 x daily - 7 x weekly - 5 sets - 15 second hold  Seated Wrist Supination Stretch - 2 x daily - 7 x weekly - 5 sets - 15 second hold    Added 12/10/2021: Emphasize 5 to 10 min holds on steady finger flexion stretch (MCP/IP to palm). OK to work on thumb-finger pinch using clothespin squeeze. 1/5/22: hand compression garments issued (Conisus), wear as much as tolerated, including at night. 2/7/22: start static hold full fist stretches x 5 min, 3x/day, and MCP flexion stretches w/ holds x 5 min. 4/6/22:   Finger MP Flexion AROM - 1 x daily - 7 x weekly - 3 sets - 15 reps  Half Fist AROM - 1 x daily - 7 x weekly - 3 sets - 15 reps  Thumb Abduction AROM on Table - 1 x daily - 7 x weekly - 3 sets - 15 reps  Seated Finger Composite Flexion Stretch - 1 x daily - 7 x weekly - 3 sets - 30 second hold  Hand PROM Finger Extension - 1 x daily - 7 x weekly - 3 sets - 30 seconds hold  Wrist Flexor Stretch in Pronation - 1 x daily - 7 x weekly - 3 sets - 30 seconds hold  Seated Thumb MCP PROM - 1 x daily - 7 x weekly - 3 sets - 30 seocnds hold  Gripping Sponge Supinated - 1 x daily - 7 x weekly - 3 sets - 15 reps      [facility]  Treatment/Session Summary:    · Response to Treatment: Destpe reprots of finger flexor stiffness, pt's composite  mobility now is quite good - nearing a stable point for considering discharge. Pt will be out of therapy until after Easter- will reassess overall ROM gains and goals for continuing PT at that time.    · Communication/Consultation:  None today  · Equipment provided today:  None today   · Recommendations/Intent for next treatment session: Emphasis on a/p/rrom to regain flexor tendon/finger flexion RODRIGEZ and thumb opposition for normal /grasp, pronation/supination. Manual therapy for joint and tendon mobility.      Total Treatment Billable Duration:  43 minutes  PT Patient Time In/Time Out  Time In: 1101  Time Out: 250 [facility],     Future Appointments   Date Time Provider Kamille Reis   4/14/2022 11:00 AM Edward Cuellar, PT [facility]   4/18/2022 11:00 AM Edward Cuellar PT [facility]   4/20/2022 11:00 AM Edward Cuellar PT [facility]   4/25/2022 11:00 AM HARLEY Roque [facility]   4/27/2022 11:00 AM Lucila Barragan, PT [facility]

## 2022-04-13 ENCOUNTER — HOSPITAL ENCOUNTER (OUTPATIENT)
Dept: PHYSICAL THERAPY | Age: 82
Discharge: HOME | End: 2022-04-13
Payer: COMMERCIAL

## 2022-04-13 PROCEDURE — 97110 THERAPEUTIC EXERCISES: CPT

## 2022-04-13 NOTE — PROGRESS NOTES
Jada Black  : 1940  Primary: Harris [facility] Briana*  Secondary:  [address] [place] @ [address]  [phone]   [phone]      OUTPATIENT PHYSICAL THERAPY:  Progress Report / Daily Treatment Note  2022   ICD-10: Treatment Diagnosis: Pain in right wrist (M25.531), Stiffness of right wrist, not elsewhere classified (M25.631), Pain in left wrist (M25.532), Stiffness of left wrist, not elsewhere classified (M25.632), Stiffness of right hand, not elsewhere classified (M25.641) and Stiffness of left hand, not elsewhere classified (M25.642)  MEDICAL/REFERRING DIAGNOSIS:  Status post bilateral distal radius fractures   TREATMENT PLAN:  Effective Dates: 3/7/22 to 22 (60 days). Frequency/Duration: 2 times a week for 60 Days  GOALS: (Goals have been discussed and agreed upon with patient.)  Short-Term Functional Goals: Time Frame: 4 weeks:   1. Independent performance of home exercise program (MET)  2. Reduce R hand edema to normal levels to allow normal finger to palm fist motions (MET)  3. Increase R wrist/hand ROM's to 75% or better of normal  In all planes of motion (MET)  Discharge Goals: Time Frame: 12 weeks  1. Pt to demonstrate at least 35#   Strength L/R for normal weight carrying (progressing)  2. Pt to demonstrate L/R wrist/ hand ROM's 85% or better of normal all planes (progressing)  3. Improve Quick-DASH scores to 15/55 or better to reflect return to normal ADL function (progressing)  Rehabilitation Potential For Stated Goals: Good  _______________________________________________________________________  Pre-treatment Symptoms/Complaints: About the same since previous visit 1 week ago. Convinced that her improvmeent in ROM is occurring but very slowly. She's able to use fingers / hand to write a bit but is more concerned about  for using utensils for eating.    Pain: Initial: 3/10 Post Session: No increase/10   Medications Last Reviewed:  4/13/2022  Updated Objective Findings:    Observation/Orthostatic Postural Assessment:    3/9/22: minimal R digit swelling. Palpation:    Diffuse right digital swelling, mild dorsal R hand swelling (12/29/2021)  ROM:    Left : Pt demonstrates ability to perform full left hook, open and full fists. AROM's : wrist flexion = 40 deg, extension = 45 deg, pronation/supination 75% of full AROM, Ulnar deviation = 18 deg, radial deviation = 20 deg. Right: Passive [place] digit ROM's: 2: passive RODRIGEZ now 75%. (12/22/2021), MCP flexion at index = 90 deg (1/22/22) , Thumb IP = full extension, 50% flexion, MCP = full extension, 50% flexion. CMC: 25% flexion. CMC radial abduction 40 deg. Wrist flexion P/AROM = 55 deg (1/5/2022), extension A/PROM = 40 deg/48 deg  Deg (1/5/22), Ulnar deviation = 15 deg PROM, radial deviation = to 15 deg. R forearm active/passive. pronation/supination  = ~ 60 deg to 75 deg (12/29/2021). Xijvboutjv-fs-dbrxppnt palmar crease measures: R hand: index and ring 4.5 cm (1/31/22)  Strength:   Left:  digits all 4-/5, Wrist flex/ext all 4/5  Right: digits all 3-/5, wrist flex/ext 4-/5   (1/19/22): 3rd run dynamometer: left = 32 #, right = 15#. 1/28/22: right = 15#    2/7/22: Fingernail to palm finger flexion measures R hand: Index = 3.5cm, Middle: 3.5 cm, Ring: 3.4 cm, 5th finger: 3.4 cm. Able to flex each MCP at digits 2-5 to 90 deg, and at each PIP to 90 deg, each w/stiff end feel. Able to lateral pinch with index to middle phalanx and tip, able to pinch to tip of middle digit with effort.  strength at 3rd rung = 17#. Quick-DASH score: 29/55  2/9/22: Pre treatment:   R hand ROM's: MCP flexion# 2 = 40 deg, #3 = 40 deg, # 4 = 40 deg, #5 = 30 deg. PIP flexion: #2 = 40 deg, # 3 = 50 deg, # 4 = 52 deg, # 5 = 50 deg,   DIP fleixon: #2 = 40 deg, # 3 = 30 deg, # 4 = 30 deg, # 5 = 28 deg.    Fingernail to proximal palmar crease distances: #2 = 6cm, # 3 = 6 cm, # 4 = 5.5 cm, # 5 = 4.3 cm. Post treatment:   MCP flexion : #2 = 60 deg, # 3 = 55 deg  PIP fleixon: #2 = 88 deg, # 3 = 70 deg. 2/16/22:  Pre treatment:   R hand ROM's: MCP flexion# 2 = 54 deg, #3 = 44 deg, # 4 = 32 deg, #5 = 26 deg. PIP flexion: #2 = 40 deg, # 3 = 50 deg, # 4 = 55 deg, # 5 = 50 deg,   DIP fleixon: #2 = 32 deg, # 3 = 32 deg, # 4 = 42 deg, # 5 = 44 deg. Post treatment fingertipe to prox. Palmar crease distance: index = 4.6cm, middle = 4.6 cm. : #2: left = 16#, right = 26#, #4: left = 18#, right = 32#.   2/25/22: Wrist PROM's : flexion = 52 deg, extn = 38 deg, radial dev. = 12 deg, ulnar dev = 25 deg. Quick DASH = 32/55. 3/7/22: PIP flexion PROM: 2nd = 80 deg, 3rd = 82 deg, 4th = 82 deg, 5th = 85 deg.   3/11/22: Fingertip to prox. Palmar crease: Index = 3.5cm, Middle = 3.2cm    3/18/22: MCP flexion: #2: 80 deg P, #3: 75 deg P, # 4: 80 deg P  PIP flexion: #2: 65 deg P, #3: 788 deg P, #4: 80 deg P  Full fist fingertip to palm distance: #2: 4.7 cm A, 4.0cm P, #3 5.0cm A, 3.0 cm P, # 5: 4.9 cm A, 3.3 cm P. Wrist AROM/PROM R: flexion : 40 deg A, 43 deg. P, Extension: 40 deg A, 45 deg P. Quick Dash: 25/55  3/25/22: R : # 2 = 15#, # 4 = 19#.       4/13/22: MCP flexion: #2: 70 deg A, 85 deg P, #3: 60 deg A, 80 deg P, # 4: 77 deg P  PIP flexion: #2: 65 deg A, 75 deg P, #3: 90 deg P, #4: 95 deg P    Wrist AROM R: flexion : 60 deg A, , Extension: 50 deg A, Radial deviation 20 deg A, Ulnar deviation = 30 deg A       TREATMENT:   THERAPEUTIC ACTIVITY: ( see below for minutes): Therapeutic activities per grid below to improve mobility, strength, balance and coordination. Required minimal visual, verbal, manual and tactile cues to improve independence and safety with daily activities . THERAPEUTIC EXERCISE: (see below for minutes):  Exercises per grid below to improve mobility, strength, balance and coordination.   Required minimal verbal and manual cues to promote proper body alignment, promote proper body posture and promote proper body mechanics. Progressed resistance, range, repetitions and complexity of movement as indicated. MANUAL THERAPY: (see below for minutes): Joint mobilization and Soft tissue mobilization was utilized and necessary because of the patient's restricted joint motion, painful spasm, loss of articular motion and restricted motion of soft tissue. MODALITIES: (see below for minutes):      to decrease pain    SELF CARE: (see below for minutes): Procedure(s) (per grid) utilized to improve and/or restore self-care/home management as related to dressing, bathing and grooming. Required minimal verbal cueing to facilitate activities of daily living skills and compensatory activities. Date: 4/13/22 (visit 64)        Modalities:                                    Therapeutic Exercise: 40 min         Flexor stretching; MCP's at2-3-4, Composite flexion at MCP-PIP-DIP, hook fist and full fists - all with sustained 20-30\" holds         Manual resist full finger flexion  X 20 (fingers 2-5)         Thumb flexion stretch x 1 min         Passive end range stretching : wrist flexion/extension: 1 min each         Manl resist 2 x 15: wrist flexion, extension, ulnar dev, radial dev. Putty (red): hoook fist x 20, thumb flexion x 15.                                                                Proprioceptive Activities:                                    Manual Therapy:                                             Therapeutic Activities:                                      HEP: Exercises    4/6/22:   Finger MP Flexion AROM - 1 x daily - 7 x weekly - 3 sets - 15 reps  Half Fist AROM - 1 x daily - 7 x weekly - 3 sets - 15 reps  Thumb Abduction AROM on Table - 1 x daily - 7 x weekly - 3 sets - 15 reps  Seated Finger Composite Flexion Stretch - 1 x daily - 7 x weekly - 3 sets - 30 second hold  Hand PROM Finger Extension - 1 x daily - 7 x weekly - 3 sets - 30 seconds hold  Wrist Flexor Stretch in Pronation - 1 x daily - 7 x weekly - 3 sets - 30 seconds hold  Seated Thumb MCP PROM - 1 x daily - 7 x weekly - 3 sets - 30 seocnds hold  Gripping Sponge Supinated - 1 x daily - 7 x weekly - 3 sets - 15 reps      [facility] Portal  Treatment/Session Summary:    · Response to Treatment: Slowing overall progress - ROM gains since last measurements are smaller. Will discuss progression of further treatment at pt's next visit with goals for planning discharge. · Communication/Consultation:  None today  · Equipment provided today:  None today   · Recommendations/Intent for next treatment session: Emphasis on a/p/rrom to regain flexor tendon/finger flexion RODRIGEZ and thumb opposition for normal /grasp, pronation/supination. Manual therapy for joint and tendon mobility.      Total Treatment Billable Duration:  45 minutes  PT Patient Time In/Time Out  Time In: 8098  Time Out: [address], PT    Future Appointments   Date Time Provider Kamille Reis   4/14/2022  7:00 PM Osmar Hazel PT [facility]   4/18/2022 11:00 AM Osmar Hazel PT [facility]   4/20/2022 11:00 AM Osmar Hazel PT [facility]   4/25/2022 11:00 AM Osmar Hazel PT [facility]   4/27/2022 11:00 AM Eric Gibson, PT [facility]

## 2022-04-14 ENCOUNTER — HOSPITAL ENCOUNTER (OUTPATIENT)
Dept: PHYSICAL THERAPY | Age: 82
Discharge: HOME | End: 2022-04-14
Payer: COMMERCIAL

## 2022-04-14 NOTE — PROGRESS NOTES
Pt cancelled today's appointment due to schedule conflict, will return at his next scheduled appointment.     Cameron Nava, PT,DPT,OCS,MTC

## 2022-04-18 ENCOUNTER — HOSPITAL ENCOUNTER (OUTPATIENT)
Dept: PHYSICAL THERAPY | Age: 82
Discharge: HOME | End: 2022-04-18
Payer: COMMERCIAL

## 2022-04-18 PROCEDURE — 97110 THERAPEUTIC EXERCISES: CPT

## 2022-04-18 NOTE — PROGRESS NOTES
Chipper Seip Pate  : 1940  Primary: [facility]  Secondary:  Donn Olivarez @ [address]  [phone]   [phone]      OUTPATIENT PHYSICAL THERAPY:  Progress Report / Daily Treatment Note  2022   ICD-10: Treatment Diagnosis: Pain in right wrist (M25.531), Stiffness of right wrist, not elsewhere classified (M25.631), Pain in left wrist (M25.532), Stiffness of left wrist, not elsewhere classified (M25.632), Stiffness of right hand, not elsewhere classified (M25.641) and Stiffness of left hand, not elsewhere classified (M25.642)  MEDICAL/REFERRING DIAGNOSIS:  Status post bilateral distal radius fractures   TREATMENT PLAN:  Effective Dates: 3/7/22 to 22 (60 days). Frequency/Duration: 2 times a week for 60 Days  GOALS: (Goals have been discussed and agreed upon with patient.)  Short-Term Functional Goals: Time Frame: 4 weeks:   1. Independent performance of home exercise program (MET)  2. Reduce R hand edema to normal levels to allow normal finger to palm fist motions (MET)  3. Increase R wrist/hand ROM's to 75% or better of normal  In all planes of motion (MET)  Discharge Goals: Time Frame: 12 weeks  1. Pt to demonstrate at least 35#   Strength L/R for normal weight carrying (progressing)  2. Pt to demonstrate L/R wrist/ hand ROM's 85% or better of normal all planes (progressing)  3. Improve Quick-DASH scores to 15/55 or better to reflect return to normal ADL function (progressing)  Rehabilitation Potential For Stated Goals: Good  _______________________________________________________________________  Pre-treatment Symptoms/Complaints: Noticing that her handis more mobile, was able to use her hand more for meal prep tasks during the weekend - 4th and 5th fingers are more mobile.    Pain: Initial: 3/10 Post Session:  No increase/10   Medications Last Reviewed:  2022  Updated Objective Findings:    Observation/Orthostatic Postural Assessment:    3/9/22: minimal R digit swelling. Palpation:    Diffuse right digital swelling, mild dorsal R hand swelling (12/29/2021)  ROM:    Left : Pt demonstrates ability to perform full left hook, open and full fists. AROM's : wrist flexion = 40 deg, extension = 45 deg, pronation/supination 75% of full AROM, Ulnar deviation = 18 deg, radial deviation = 20 deg. Right: Passive Carry Snowball digit ROM's: 2: passive RODRIGEZ now 75%. (12/22/2021), MCP flexion at index = 90 deg (1/22/22) , Thumb IP = full extension, 50% flexion, MCP = full extension, 50% flexion. CMC: 25% flexion. CMC radial abduction 40 deg. Wrist flexion P/AROM = 55 deg (1/5/2022), extension A/PROM = 40 deg/48 deg  Deg (1/5/22), Ulnar deviation = 15 deg PROM, radial deviation = to 15 deg. R forearm active/passive. pronation/supination  = ~ 60 deg to 75 deg (12/29/2021). Hmprxgzqqh-je-ljgmzdvj palmar crease measures: R hand: index and ring 4.5 cm (1/31/22)  Strength:   Left:  digits all 4-/5, Wrist flex/ext all 4/5  Right: digits all 3-/5, wrist flex/ext 4-/5   (1/19/22): 3rd run dynamometer: left = 32 #, right = 15#. 1/28/22: right = 15#    2/7/22: Fingernail to palm finger flexion measures R hand: Index = 3.5cm, Middle: 3.5 cm, Ring: 3.4 cm, 5th finger: 3.4 cm. Able to flex each MCP at digits 2-5 to 90 deg, and at each PIP to 90 deg, each w/stiff end feel. Able to lateral pinch with index to middle phalanx and tip, able to pinch to tip of middle digit with effort.  strength at 3rd rung = 17#. Quick-DASH score: 29/55  2/9/22: Pre treatment:   R hand ROM's: MCP flexion# 2 = 40 deg, #3 = 40 deg, # 4 = 40 deg, #5 = 30 deg. PIP flexion: #2 = 40 deg, # 3 = 50 deg, # 4 = 52 deg, # 5 = 50 deg,   DIP fleixon: #2 = 40 deg, # 3 = 30 deg, # 4 = 30 deg, # 5 = 28 deg. Fingernail to proximal palmar crease distances: #2 = 6cm, # 3 = 6 cm, # 4 = 5.5 cm, # 5 = 4.3 cm.    Post treatment:   MCP flexion : #2 = 60 deg, # 3 = 55 deg  PIP fleixon: #2 = 88 deg, # 3 = 70 deg. 2/16/22:  Pre treatment:   R hand ROM's: MCP flexion# 2 = 54 deg, #3 = 44 deg, # 4 = 32 deg, #5 = 26 deg. PIP flexion: #2 = 40 deg, # 3 = 50 deg, # 4 = 55 deg, # 5 = 50 deg,   DIP fleixon: #2 = 32 deg, # 3 = 32 deg, # 4 = 42 deg, # 5 = 44 deg. Post treatment fingertipe to prox. Palmar crease distance: index = 4.6cm, middle = 4.6 cm. : #2: left = 16#, right = 26#, #4: left = 18#, right = 32#.   2/25/22: Wrist PROM's : flexion = 52 deg, extn = 38 deg, radial dev. = 12 deg, ulnar dev = 25 deg. Quick DASH = 32/55. 3/7/22: PIP flexion PROM: 2nd = 80 deg, 3rd = 82 deg, 4th = 82 deg, 5th = 85 deg.   3/11/22: Fingertip to prox. Palmar crease: Index = 3.5cm, Middle = 3.2cm    3/18/22: MCP flexion: #2: 80 deg P, #3: 75 deg P, # 4: 80 deg P  PIP flexion: #2: 65 deg P, #3: 788 deg P, #4: 80 deg P  Full fist fingertip to palm distance: #2: 4.7 cm A, 4.0cm P, #3 5.0cm A, 3.0 cm P, # 5: 4.9 cm A, 3.3 cm P. Wrist AROM/PROM R: flexion : 40 deg A, 43 deg. P, Extension: 40 deg A, 45 deg P. Quick Dash: 25/55  3/25/22: R : # 2 = 15#, # 4 = 19#.       4/13/22: MCP flexion: #2: 70 deg A, 85 deg P, #3: 60 deg A, 80 deg P, # 4: 77 deg P  PIP flexion: #2: 65 deg A, 75 deg P, #3: 90 deg P, #4: 95 deg P    Wrist AROM R: flexion : 60 deg A, , Extension: 50 deg A, Radial deviation 20 deg A, Ulnar deviation = 30 deg A       TREATMENT:   THERAPEUTIC ACTIVITY: ( see below for minutes): Therapeutic activities per grid below to improve mobility, strength, balance and coordination. Required minimal visual, verbal, manual and tactile cues to improve independence and safety with daily activities . THERAPEUTIC EXERCISE: (see below for minutes):  Exercises per grid below to improve mobility, strength, balance and coordination. Required minimal verbal and manual cues to promote proper body alignment, promote proper body posture and promote proper body mechanics. Progressed resistance, range, repetitions and complexity of movement as indicated. MANUAL THERAPY: (see below for minutes): Joint mobilization and Soft tissue mobilization was utilized and necessary because of the patient's restricted joint motion, painful spasm, loss of articular motion and restricted motion of soft tissue. MODALITIES: (see below for minutes):      to decrease pain    SELF CARE: (see below for minutes): Procedure(s) (per grid) utilized to improve and/or restore self-care/home management as related to dressing, bathing and grooming. Required minimal verbal cueing to facilitate activities of daily living skills and compensatory activities. Date: 4/13/22 (visit 64) 4/18/22 (visit 62)       Modalities:                                    Therapeutic Exercise: 40 min 41 min        Flexor stretching; MCP's at2-3-4, Composite flexion at MCP-PIP-DIP, hook fist and full fists - all with sustained 20-30\" holds Flexor stretching: MCP's @ 2-3-4-5 w/ 30\"holds x 4, Composite flexion at ip's (hook fist), MCP/PIP (open fist) and full fist  W/ 15-20\" holds: total x 20 min        Manual resist full finger flexion  X 20 (fingers 2-5) End range stretch 1 min each each wrist flexion, wrist extn. Thumb flexion stretch x 1 min Thumb (CMC-MCP-IP) combined flexion x 1 min, thumb abduction x 1 min        Passive end range stretching : wrist flexion/extension: 1 min each Theraputty red: hook fist x 30, thumb flexion x 20        Manl resist 2 x 15: wrist flexion, extension, ulnar dev, radial dev. Wrist curls: 2 x 20 @ 3#, reverse curls 2 x 20 @ 3#, radial dev 2 x 20 @ 3#        Putty (red): hoook fist x 20, thumb flexion x 15. Brachioradialis R elbow curls x 30 @ 7#,          Biceps R curls 30 @ 5#         Therabar: : green supinations x 30 bilat, pronations w/ red x 30 bilat         Rowing w/ black tubing x 30 bilat         Standing bilat punches w/ black tubing x 30 bilat. Proprioceptive Activities:                                    Manual Therapy:                                             Therapeutic Activities:                                      HEP: Exercises    4/6/22:   Finger MP Flexion AROM - 1 x daily - 7 x weekly - 3 sets - 15 reps  Half Fist AROM - 1 x daily - 7 x weekly - 3 sets - 15 reps  Thumb Abduction AROM on Table - 1 x daily - 7 x weekly - 3 sets - 15 reps  Seated Finger Composite Flexion Stretch - 1 x daily - 7 x weekly - 3 sets - 30 second hold  Hand PROM Finger Extension - 1 x daily - 7 x weekly - 3 sets - 30 seconds hold  Wrist Flexor Stretch in Pronation - 1 x daily - 7 x weekly - 3 sets - 30 seconds hold  Seated Thumb MCP PROM - 1 x daily - 7 x weekly - 3 sets - 30 seocnds hold  Gripping Sponge Supinated - 1 x daily - 7 x weekly - 3 sets - 15 reps      [employer] Portal  Treatment/Session Summary:    · Response to Treatment: R 4th-5th finger composite full flexion now 90% + of normal. Weaker  at all digits in available flexion. · Communication/Consultation:  None today  · Equipment provided today:  None today   · Recommendations/Intent for next treatment session: Emphasis on a/p/rrom to regain flexor tendon/finger flexion RODRIGEZ and thumb opposition for normal /grasp, pronation/supination. Manual therapy for joint and tendon mobility.      Total Treatment Billable Duration:  41 minutes  PT Patient Time In/Time Out  Time In: 9475  Time Out: 250 [facility], PT    Future Appointments   Date Time Provider Kamille Reis   4/20/2022 11:00 AM Sander Roa PT [facility]   4/25/2022 11:00 AM Sander Roa PT [facility]   4/27/2022 11:00 AM Fanny Barragan, PT [facility]

## 2022-04-20 ENCOUNTER — HOSPITAL ENCOUNTER (OUTPATIENT)
Dept: PHYSICAL THERAPY | Age: 82
Discharge: HOME | End: 2022-04-20
Payer: COMMERCIAL

## 2022-04-20 PROCEDURE — 97110 THERAPEUTIC EXERCISES: CPT

## 2022-04-20 NOTE — PROGRESS NOTES
Darby Black  : 1940  Primary: Harris Koch Chio [facility] hermila Chirinos  Secondary:  [address] @ [address]  [phone]   [phone]      OUTPATIENT PHYSICAL THERAPY:  Daily Treatment Note  2022   ICD-10: Treatment Diagnosis: Pain in right wrist (M25.531), Stiffness of right wrist, not elsewhere classified (M25.631), Pain in left wrist (M25.532), Stiffness of left wrist, not elsewhere classified (M25.632), Stiffness of right hand, not elsewhere classified (M25.641) and Stiffness of left hand, not elsewhere classified (M25.642)  MEDICAL/REFERRING DIAGNOSIS:  Status post bilateral distal radius fractures   TREATMENT PLAN:  Effective Dates: 3/7/22 to 22 (60 days). Frequency/Duration: 2 times a week for 60 Days  GOALS: (Goals have been discussed and agreed upon with patient.)  Short-Term Functional Goals: Time Frame: 4 weeks:   1. Independent performance of home exercise program (MET)  2. Reduce R hand edema to normal levels to allow normal finger to palm fist motions (MET)  3. Increase R wrist/hand ROM's to 75% or better of normal  In all planes of motion (MET)  Discharge Goals: Time Frame: 12 weeks  1. Pt to demonstrate at least 35#   Strength L/R for normal weight carrying (progressing)  2. Pt to demonstrate L/R wrist/ hand ROM's 85% or better of normal all planes (progressing)  3. Improve Quick-DASH scores to 15/55 or better to reflect return to normal ADL function (progressing)  Rehabilitation Potential For Stated Goals: Good  _______________________________________________________________________  Pre-treatment Symptoms/Complaints: Stiffer hand today - noticing a pattern of a few days of stiffness/soreness after PT sessions, but after this better motion in her fingers.     Pain: Initial: 3/10 Post Session:  No increase/10   Medications Last Reviewed:  2022  Updated Objective Findings: Observation/Orthostatic Postural Assessment:    3/9/22: minimal R digit swelling. Palpation:    Diffuse right digital swelling, mild dorsal R hand swelling (12/29/2021)  ROM:    Left : Pt demonstrates ability to perform full left hook, open and full fists. AROM's : wrist flexion = 40 deg, extension = 45 deg, pronation/supination 75% of full AROM, Ulnar deviation = 18 deg, radial deviation = 20 deg. Right: Passive Dhruv Gwendolyn digit ROM's: 2: passive RODRIGEZ now 75%. (12/22/2021), MCP flexion at index = 90 deg (1/22/22) , Thumb IP = full extension, 50% flexion, MCP = full extension, 50% flexion. CMC: 25% flexion. CMC radial abduction 40 deg. Wrist flexion P/AROM = 55 deg (1/5/2022), extension A/PROM = 40 deg/48 deg  Deg (1/5/22), Ulnar deviation = 15 deg PROM, radial deviation = to 15 deg. R forearm active/passive. pronation/supination  = ~ 60 deg to 75 deg (12/29/2021). Tsybertyro-xh-bzjektgo palmar crease measures: R hand: index and ring 4.5 cm (1/31/22)  Strength:   Left:  digits all 4-/5, Wrist flex/ext all 4/5  Right: digits all 3-/5, wrist flex/ext 4-/5   (1/19/22): 3rd run dynamometer: left = 32 #, right = 15#. 1/28/22: right = 15#    2/7/22: Fingernail to palm finger flexion measures R hand: Index = 3.5cm, Middle: 3.5 cm, Ring: 3.4 cm, 5th finger: 3.4 cm. Able to flex each MCP at digits 2-5 to 90 deg, and at each PIP to 90 deg, each w/stiff end feel. Able to lateral pinch with index to middle phalanx and tip, able to pinch to tip of middle digit with effort.  strength at 3rd rung = 17#. Quick-DASH score: 29/55  2/9/22: Pre treatment:   R hand ROM's: MCP flexion# 2 = 40 deg, #3 = 40 deg, # 4 = 40 deg, #5 = 30 deg. PIP flexion: #2 = 40 deg, # 3 = 50 deg, # 4 = 52 deg, # 5 = 50 deg,   DIP fleixon: #2 = 40 deg, # 3 = 30 deg, # 4 = 30 deg, # 5 = 28 deg. Fingernail to proximal palmar crease distances: #2 = 6cm, # 3 = 6 cm, # 4 = 5.5 cm, # 5 = 4.3 cm.    Post treatment:   MCP flexion : #2 = 60 deg, # 3 = 55 deg  PIP fleixon: #2 = 88 deg, # 3 = 70 deg. 2/16/22:  Pre treatment:   R hand ROM's: MCP flexion# 2 = 54 deg, #3 = 44 deg, # 4 = 32 deg, #5 = 26 deg. PIP flexion: #2 = 40 deg, # 3 = 50 deg, # 4 = 55 deg, # 5 = 50 deg,   DIP fleixon: #2 = 32 deg, # 3 = 32 deg, # 4 = 42 deg, # 5 = 44 deg. Post treatment fingertipe to prox. Palmar crease distance: index = 4.6cm, middle = 4.6 cm. : #2: left = 16#, right = 26#, #4: left = 18#, right = 32#.   2/25/22: Wrist PROM's : flexion = 52 deg, extn = 38 deg, radial dev. = 12 deg, ulnar dev = 25 deg. Quick DASH = 32/55. 3/7/22: PIP flexion PROM: 2nd = 80 deg, 3rd = 82 deg, 4th = 82 deg, 5th = 85 deg.   3/11/22: Fingertip to prox. Palmar crease: Index = 3.5cm, Middle = 3.2cm    3/18/22: MCP flexion: #2: 80 deg P, #3: 75 deg P, # 4: 80 deg P  PIP flexion: #2: 65 deg P, #3: 788 deg P, #4: 80 deg P  Full fist fingertip to palm distance: #2: 4.7 cm A, 4.0cm P, #3 5.0cm A, 3.0 cm P, # 5: 4.9 cm A, 3.3 cm P. Wrist AROM/PROM R: flexion : 40 deg A, 43 deg. P, Extension: 40 deg A, 45 deg P. Quick Dash: 25/55  3/25/22: R : # 2 = 15#, # 4 = 19#.       4/13/22: MCP flexion: #2: 70 deg A, 85 deg P, #3: 60 deg A, 80 deg P, # 4: 77 deg P  PIP flexion: #2: 65 deg A, 75 deg P, #3: 90 deg P, #4: 95 deg P    Wrist AROM R: flexion : 60 deg A, , Extension: 50 deg A, Radial deviation 20 deg A, Ulnar deviation = 30 deg A       TREATMENT:   THERAPEUTIC ACTIVITY: ( see below for minutes): Therapeutic activities per grid below to improve mobility, strength, balance and coordination. Required minimal visual, verbal, manual and tactile cues to improve independence and safety with daily activities . THERAPEUTIC EXERCISE: (see below for minutes):  Exercises per grid below to improve mobility, strength, balance and coordination. Required minimal verbal and manual cues to promote proper body alignment, promote proper body posture and promote proper body mechanics.   Progressed resistance, range, repetitions and complexity of movement as indicated. MANUAL THERAPY: (see below for minutes): Joint mobilization and Soft tissue mobilization was utilized and necessary because of the patient's restricted joint motion, painful spasm, loss of articular motion and restricted motion of soft tissue. MODALITIES: (see below for minutes):      to decrease pain    SELF CARE: (see below for minutes): Procedure(s) (per grid) utilized to improve and/or restore self-care/home management as related to dressing, bathing and grooming. Required minimal verbal cueing to facilitate activities of daily living skills and compensatory activities. Date: 4/13/22 (visit 64) 4/18/22 (visit 62) 4/20/22 (visit 62)      Modalities:                                    Therapeutic Exercise: 40 min 41 min 40 min       Flexor stretching; MCP's at2-3-4, Composite flexion at MCP-PIP-DIP, hook fist and full fists - all with sustained 20-30\" holds Flexor stretching: MCP's @ 2-3-4-5 w/ 30\"holds x 4, Composite flexion at ip's (hook fist), MCP/PIP (open fist) and full fist  W/ 15-20\" holds: total x 20 min Flexion stretching w /static holds at [employer] vascular solution [facility], PIP's and composite hook and full fist postions - individual at digits 2-5 and combined. Manual resist full finger flexion  X 20 (fingers 2-5) End range stretch 1 min each each wrist flexion, wrist extn. 3/4\" dowel gripping x 20       Thumb flexion stretch x 1 min Thumb (CMC-MCP-IP) combined flexion x 1 min, thumb abduction x 1 min End range stretch: 1 min each wrist flexion, wrist extension       Passive end range stretching : wrist flexion/extension: 1 min each Theraputty red: hook fist x 30, thumb flexion x 20 Thumb CMC abduction, flexion and combined thumb full flexion to palm : 3 min       Manl resist 2 x 15: wrist flexion, extension, ulnar dev, radial dev.  Wrist curls: 2 x 20 @ 3#, reverse curls 2 x 20 @ 3#, radial dev 2 x 20 @ 3# Red Therabar: 30x wrist bilat flexion/twists, 30x bilat pronation, 30x bilat supination       Putty (red): hoook fist x 20, thumb flexion x 15. Brachioradialis R elbow curls x 30 @ 7#,  Biceps curls partial range R 7# x 30        Biceps R curls 30 @ 5# Rubber band gripper : 4 x 10         Therabar: : green supinations x 30 bilat, pronations w/ red x 30 bilat Manl resist R wrist extensions x 30        Rowing w/ black tubing x 30 bilat Theraweb gripping for DIP flexions x 30         Standing bilat punches w/ black tubing x 30 bilat. Theraweb finger abductions x 30          Green therabar rapid radial/ulnar deviations x 50                Proprioceptive Activities:                                    Manual Therapy:   3 min         STM palmar proximal phalanges to mid palms x 3 min                                 Therapeutic Activities:                                      HEP: Exercises    4/6/22:   Finger MP Flexion AROM - 1 x daily - 7 x weekly - 3 sets - 15 reps  Half Fist AROM - 1 x daily - 7 x weekly - 3 sets - 15 reps  Thumb Abduction AROM on Table - 1 x daily - 7 x weekly - 3 sets - 15 reps  Seated Finger Composite Flexion Stretch - 1 x daily - 7 x weekly - 3 sets - 30 second hold  Hand PROM Finger Extension - 1 x daily - 7 x weekly - 3 sets - 30 seconds hold  Wrist Flexor Stretch in Pronation - 1 x daily - 7 x weekly - 3 sets - 30 seconds hold  Seated Thumb MCP PROM - 1 x daily - 7 x weekly - 3 sets - 30 seocnds hold  Gripping Sponge Supinated - 1 x daily - 7 x weekly - 3 sets - 15 reps      [employer] Portal  Treatment/Session Summary:    · Response to Treatment: Adequate composite finger flexion for gripping steering wheel now. Able to advance biceps curl training. · Communication/Consultation:  None today  · Equipment provided today:  None today   · Recommendations/Intent for next treatment session: Emphasis on a/p/rrom to regain flexor tendon/finger flexion RODRIGEZ and thumb opposition for normal /grasp, pronation/supination.  Manual therapy for joint and tendon mobility.      Total Treatment Billable Duration:  43 minutes  PT Patient Time In/Time Out  Time In: 1100  Time Out: 44[address] [facility], PT    Future Appointments   Date Time Provider Kamille Reis   4/25/2022 11:00 AM Lacey Real, PT [facility]   4/27/2022 11:00 AM Lacey Real, PT [facility]   5/2/2022 11:00 AM Lacey Real, PT [facility]   5/4/2022 11:00 AM Lacey Real, PT [facility]   5/11/2022 11:00 AM Lacey Real, PT [facility]   5/13/2022 11:00 AM Lacey Real, PT [facility]   5/16/2022 11:00 AM Lacey Real, PT [facility]   5/18/2022 11:00 AM Lacey Real, PT [facility]   5/23/2022 11:00 AM Lacey Real, PT [facility]   5/25/2022 11:00 AM Theo Barragan Din, PT [facility]

## 2022-04-25 ENCOUNTER — HOSPITAL ENCOUNTER (OUTPATIENT)
Dept: PHYSICAL THERAPY | Age: 82
Discharge: HOME | End: 2022-04-25
Payer: COMMERCIAL

## 2022-04-25 PROCEDURE — 97110 THERAPEUTIC EXERCISES: CPT

## 2022-04-25 NOTE — PROGRESS NOTES
Patricia Black  : 1940  Primary: [facility] [place] [facility]*  Secondary:  Kimo Hale @ [address].  [phone]   [phone]      OUTPATIENT PHYSICAL THERAPY:  Daily Treatment Note  2022   ICD-10: Treatment Diagnosis: Pain in right wrist (M25.531), Stiffness of right wrist, not elsewhere classified (M25.631), Pain in left wrist (M25.532), Stiffness of left wrist, not elsewhere classified (M25.632), Stiffness of right hand, not elsewhere classified (M25.641) and Stiffness of left hand, not elsewhere classified (M25.642)  MEDICAL/REFERRING DIAGNOSIS:  Status post bilateral distal radius fractures   TREATMENT PLAN:  Effective Dates: 3/7/22 to 22 (60 days). Frequency/Duration: 2 times a week for 60 Days  GOALS: (Goals have been discussed and agreed upon with patient.)  Short-Term Functional Goals: Time Frame: 4 weeks:   1. Independent performance of home exercise program (MET)  2. Reduce R hand edema to normal levels to allow normal finger to palm fist motions (MET)  3. Increase R wrist/hand ROM's to 75% or better of normal  In all planes of motion (MET)  Discharge Goals: Time Frame: 12 weeks  1. Pt to demonstrate at least 35#   Strength L/R for normal weight carrying (progressing)  2. Pt to demonstrate L/R wrist/ hand ROM's 85% or better of normal all planes (progressing)  3. Improve Quick-DASH scores to 15/55 or better to reflect return to normal ADL function (progressing)  Rehabilitation Potential For Stated Goals: Good  _______________________________________________________________________  Pre-treatment Symptoms/Complaints: R ring finger is sore from using hand to garden last weekend. .    Pain: Initial: 3/10 Post Session:  No increase/10   Medications Last Reviewed:  2022  Updated Objective Findings:    Observation/Orthostatic Postural Assessment:    3/9/22: minimal R digit swelling.    Palpation: Diffuse right digital swelling, mild dorsal R hand swelling (12/29/2021)  ROM:    Left : Pt demonstrates ability to perform full left hook, open and full fists. AROM's : wrist flexion = 40 deg, extension = 45 deg, pronation/supination 75% of full AROM, Ulnar deviation = 18 deg, radial deviation = 20 deg. Right: Passive Murray Flax digit ROM's: 2: passive RODRIGEZ now 75%. (12/22/2021), MCP flexion at index = 90 deg (1/22/22) , Thumb IP = full extension, 50% flexion, MCP = full extension, 50% flexion. CMC: 25% flexion. CMC radial abduction 40 deg. Wrist flexion P/AROM = 55 deg (1/5/2022), extension A/PROM = 40 deg/48 deg  Deg (1/5/22), Ulnar deviation = 15 deg PROM, radial deviation = to 15 deg. R forearm active/passive. pronation/supination  = ~ 60 deg to 75 deg (12/29/2021). Fnmqedviyj-zs-qoekztvs palmar crease measures: R hand: index and ring 4.5 cm (1/31/22)  Strength:   Left:  digits all 4-/5, Wrist flex/ext all 4/5  Right: digits all 3-/5, wrist flex/ext 4-/5   (1/19/22): 3rd run dynamometer: left = 32 #, right = 15#. 1/28/22: right = 15#    2/7/22: Fingernail to palm finger flexion measures R hand: Index = 3.5cm, Middle: 3.5 cm, Ring: 3.4 cm, 5th finger: 3.4 cm. Able to flex each MCP at digits 2-5 to 90 deg, and at each PIP to 90 deg, each w/stiff end feel. Able to lateral pinch with index to middle phalanx and tip, able to pinch to tip of middle digit with effort.  strength at 3rd rung = 17#. Quick-DASH score: 29/55  2/9/22: Pre treatment:   R hand ROM's: MCP flexion# 2 = 40 deg, #3 = 40 deg, # 4 = 40 deg, #5 = 30 deg. PIP flexion: #2 = 40 deg, # 3 = 50 deg, # 4 = 52 deg, # 5 = 50 deg,   DIP fleixon: #2 = 40 deg, # 3 = 30 deg, # 4 = 30 deg, # 5 = 28 deg. Fingernail to proximal palmar crease distances: #2 = 6cm, # 3 = 6 cm, # 4 = 5.5 cm, # 5 = 4.3 cm. Post treatment:   MCP flexion : #2 = 60 deg, # 3 = 55 deg  PIP fleixon: #2 = 88 deg, # 3 = 70 deg.    2/16/22:  Pre treatment:   R hand ROM's: MCP flexion# 2 = 54 deg, #3 = 44 deg, # 4 = 32 deg, #5 = 26 deg. PIP flexion: #2 = 40 deg, # 3 = 50 deg, # 4 = 55 deg, # 5 = 50 deg,   DIP fleixon: #2 = 32 deg, # 3 = 32 deg, # 4 = 42 deg, # 5 = 44 deg. Post treatment fingertipe to prox. Palmar crease distance: index = 4.6cm, middle = 4.6 cm. : #2: left = 16#, right = 26#, #4: left = 18#, right = 32#.   2/25/22: Wrist PROM's : flexion = 52 deg, extn = 38 deg, radial dev. = 12 deg, ulnar dev = 25 deg. Quick DASH = 32/55. 3/7/22: PIP flexion PROM: 2nd = 80 deg, 3rd = 82 deg, 4th = 82 deg, 5th = 85 deg.   3/11/22: Fingertip to prox. Palmar crease: Index = 3.5cm, Middle = 3.2cm    3/18/22: MCP flexion: #2: 80 deg P, #3: 75 deg P, # 4: 80 deg P  PIP flexion: #2: 65 deg P, #3: 788 deg P, #4: 80 deg P  Full fist fingertip to palm distance: #2: 4.7 cm A, 4.0cm P, #3 5.0cm A, 3.0 cm P, # 5: 4.9 cm A, 3.3 cm P. Wrist AROM/PROM R: flexion : 40 deg A, 43 deg. P, Extension: 40 deg A, 45 deg P. Quick Dash: 25/55  3/25/22: R : # 2 = 15#, # 4 = 19#.       4/13/22: MCP flexion: #2: 70 deg A, 85 deg P, #3: 60 deg A, 80 deg P, # 4: 77 deg P  PIP flexion: #2: 65 deg A, 75 deg P, #3: 90 deg P, #4: 95 deg P    Wrist AROM R: flexion : 60 deg A, , Extension: 50 deg A, Radial deviation 20 deg A, Ulnar deviation = 30 deg A       TREATMENT:   THERAPEUTIC ACTIVITY: ( see below for minutes): Therapeutic activities per grid below to improve mobility, strength, balance and coordination. Required minimal visual, verbal, manual and tactile cues to improve independence and safety with daily activities . THERAPEUTIC EXERCISE: (see below for minutes):  Exercises per grid below to improve mobility, strength, balance and coordination. Required minimal verbal and manual cues to promote proper body alignment, promote proper body posture and promote proper body mechanics. Progressed resistance, range, repetitions and complexity of movement as indicated.   MANUAL THERAPY: (see below for minutes): Joint mobilization and Soft tissue mobilization was utilized and necessary because of the patient's restricted joint motion, painful spasm, loss of articular motion and restricted motion of soft tissue. MODALITIES: (see below for minutes):      to decrease pain    SELF CARE: (see below for minutes): Procedure(s) (per grid) utilized to improve and/or restore self-care/home management as related to dressing, bathing and grooming. Required minimal verbal cueing to facilitate activities of daily living skills and compensatory activities. Date: 4/13/22 (visit 64) 4/18/22 (visit 62) 4/20/22 (visit 62) 4/25/22 (visit 61)     Modalities:                                    Therapeutic Exercise: 40 min 41 min 40 min 38 min      Flexor stretching; MCP's at2-3-4, Composite flexion at MCP-PIP-DIP, hook fist and full fists - all with sustained 20-30\" holds Flexor stretching: MCP's @ 2-3-4-5 w/ 30\"holds x 4, Composite flexion at ip's (hook fist), MCP/PIP (open fist) and full fist  W/ 15-20\" holds: total x 20 min Flexion stretching w /static holds at [employer], PIP's and composite hook and full fist postions - individual at digits 2-5 and combined. Extension stretch at PIP's: 1 min each digits 2-5      Manual resist full finger flexion  X 20 (fingers 2-5) End range stretch 1 min each each wrist flexion, wrist extn.   3/4\" dowel gripping x 20 R finger flexion stretching: static holds at MCP's 2-5, open fist static stretch 2-5, full fist at 2-3      Thumb flexion stretch x 1 min Thumb (CMC-MCP-IP) combined flexion x 1 min, thumb abduction x 1 min End range stretch: 1 min each wrist flexion, wrist extension Gold gripper squeezes x 40      Passive end range stretching : wrist flexion/extension: 1 min each Theraputty red: hook fist x 30, thumb flexion x 20 Thumb CMC abduction, flexion and combined thumb full flexion to palm : 3 min Thumb CMC, PIP flexion static stretch: 2 min      Manl resist 2 x 15: wrist flexion, extension, ulnar dev, radial dev. Wrist curls: 2 x 20 @ 3#, reverse curls 2 x 20 @ 3#, radial dev 2 x 20 @ 3# Red Therabar: 30x wrist bilat flexion/twists, 30x bilat pronation, 30x bilat supination Finger rolling arom/coordination x 20      Putty (red): hoook fist x 20, thumb flexion x 15. Brachioradialis R elbow curls x 30 @ 7#,  Biceps curls partial range R 7# x 30 Manl. Resist : DIP flexion/holds and isolated curls: 30 total at digits 2-5       Biceps R curls 30 @ 5# Rubber band gripper : 4 x 10  Green fingerweb DIP finger flexions /w/ 2\" holds x 20       Therabar: : green supinations x 30 bilat, pronations w/ red x 30 bilat Manl resist R wrist extensions x 30 Biceps curls w/ 6# x 30,        Rowing w/ black tubing x 30 bilat Theraweb gripping for DIP flexions x 30  Wrist extn x 30 w/ 6#       Standing bilat punches w/ black tubing x 30 bilat.   Theraweb finger abductions x 30  R pronation/supination w/ 6#  X 20        Green therabar rapid radial/ulnar deviations x 50                Proprioceptive Activities:                                    Manual Therapy:   3 min 2 min        STM palmar proximal phalanges to mid palms x 3 min STM: palmar MCP's x 2 min                                Therapeutic Activities:                                      HEP: Exercises    4/6/22:   Finger MP Flexion AROM - 1 x daily - 7 x weekly - 3 sets - 15 reps  Half Fist AROM - 1 x daily - 7 x weekly - 3 sets - 15 reps  Thumb Abduction AROM on Table - 1 x daily - 7 x weekly - 3 sets - 15 reps  Seated Finger Composite Flexion Stretch - 1 x daily - 7 x weekly - 3 sets - 30 second hold  Hand PROM Finger Extension - 1 x daily - 7 x weekly - 3 sets - 30 seconds hold  Wrist Flexor Stretch in Pronation - 1 x daily - 7 x weekly - 3 sets - 30 seconds hold  Seated Thumb MCP PROM - 1 x daily - 7 x weekly - 3 sets - 30 seocnds hold  Gripping Sponge Supinated - 1 x daily - 7 x weekly - 3 sets - 15 reps      [facility]  Treatment/Session Summary:    · Response to Treatment: Finger  at IP's continues to increase as does MCP/PIP flexion ROM  · Communication/Consultation:  None today  · Equipment provided today:  None today   · Recommendations/Intent for next treatment session: Emphasis on a/p/rrom to regain flexor tendon/finger flexion RODRIGEZ and thumb opposition for normal /grasp, pronation/supination. Manual therapy for joint and tendon mobility.      Total Treatment Billable Duration:  40 minutes  PT Patient Time In/Time Out  Time In: 3489  Time Out: 250 [facility], PT    Future Appointments   Date Time Provider Kamille Reis   4/27/2022 11:00 AM Gutierrez Merchant, PT [facility]   5/2/2022 11:00 AM Gutierrez Merchant, PT [facility]   5/4/2022 11:00 AM Gutierrez Merchant, PT [facility]   5/11/2022 11:00 AM Gutierrez Merchant, PT [facility]   5/13/2022 11:00 AM Gutierrez Merchant, PT [facility]   5/16/2022 11:00 AM Gutierrez Merchant, PT [facility]   5/18/2022 11:00 AM Gutierrez Merchant, PT [facility]   5/23/2022 11:00 AM Gutierrez Merchant, PT [facility]   5/25/2022 11:00 AM Tri Perera, PT [facility]

## 2022-04-27 ENCOUNTER — HOSPITAL ENCOUNTER (OUTPATIENT)
Dept: PHYSICAL THERAPY | Age: 82
Discharge: HOME | End: 2022-04-27
Payer: COMMERCIAL

## 2022-04-27 PROCEDURE — 97110 THERAPEUTIC EXERCISES: CPT

## 2022-04-27 NOTE — PROGRESS NOTES
Cohutta [place] Sofia  : 1940  Primary: Harris Gonzalez Of [facility]*  Secondary:  [address]  [phone]   [phone]      OUTPATIENT PHYSICAL THERAPY:  Daily Treatment Note  2022   ICD-10: Treatment Diagnosis: Pain in right wrist (M25.531), Stiffness of right wrist, not elsewhere classified (M25.631), Pain in left wrist (M25.532), Stiffness of left wrist, not elsewhere classified (M25.632), Stiffness of right hand, not elsewhere classified (M25.641) and Stiffness of left hand, not elsewhere classified (M25.642)  MEDICAL/REFERRING DIAGNOSIS:  Status post bilateral distal radius fractures   TREATMENT PLAN:  Effective Dates: 3/7/22 to 22 (60 days). Frequency/Duration: 2 times a week for 60 Days  GOALS: (Goals have been discussed and agreed upon with patient.)  Short-Term Functional Goals: Time Frame: 4 weeks:   1. Independent performance of home exercise program (MET)  2. Reduce R hand edema to normal levels to allow normal finger to palm fist motions (MET)  3. Increase R wrist/hand ROM's to 75% or better of normal  In all planes of motion (MET)  Discharge Goals: Time Frame: 12 weeks  1. Pt to demonstrate at least 35#   Strength L/R for normal weight carrying (progressing)  2. Pt to demonstrate L/R wrist/ hand ROM's 85% or better of normal all planes (progressing)  3. Improve Quick-DASH scores to 15/55 or better to reflect return to normal ADL function (progressing)  Rehabilitation Potential For Stated Goals: Good  _______________________________________________________________________  Pre-treatment Symptoms/Complaints: R Middle finger not working as well as others, fingers are sore. Pain: Initial: 3/10 Post Session:  No increase/10   Medications Last Reviewed:  2022  Updated Objective Findings:    Observation/Orthostatic Postural Assessment:    3/9/22: minimal R digit swelling. Palpation:    Diffuse right digital swelling, mild dorsal R hand swelling (12/29/2021)  ROM:    Left : Pt demonstrates ability to perform full left hook, open and full fists. AROM's : wrist flexion = 40 deg, extension = 45 deg, pronation/supination 75% of full AROM, Ulnar deviation = 18 deg, radial deviation = 20 deg. Right: Passive Tilda Murcia digit ROM's: 2: passive RODRIGEZ now 75%. (12/22/2021), MCP flexion at index = 90 deg (1/22/22) , Thumb IP = full extension, 50% flexion, MCP = full extension, 50% flexion. CMC: 25% flexion. CMC radial abduction 40 deg. Wrist flexion P/AROM = 55 deg (1/5/2022), extension A/PROM = 40 deg/48 deg  Deg (1/5/22), Ulnar deviation = 15 deg PROM, radial deviation = to 15 deg. R forearm active/passive. pronation/supination  = ~ 60 deg to 75 deg (12/29/2021). [id number]-ct-[id number] palmar crease measures: R hand: index and ring 4.5 cm (1/31/22)  Strength:   Left:  digits all 4-/5, Wrist flex/ext all 4/5  Right: digits all 3-/5, wrist flex/ext 4-/5   (1/19/22): 3rd run dynamometer: left = 32 #, right = 15#. 1/28/22: right = 15#    2/7/22: Fingernail to palm finger flexion measures R hand: Index = 3.5cm, Middle: 3.5 cm, Ring: 3.4 cm, 5th finger: 3.4 cm. Able to flex each MCP at digits 2-5 to 90 deg, and at each PIP to 90 deg, each w/stiff end feel. Able to lateral pinch with index to middle phalanx and tip, able to pinch to tip of middle digit with effort.  strength at 3rd rung = 17#. Quick-DASH score: 29/55  2/9/22: Pre treatment:   R hand ROM's: MCP flexion# 2 = 40 deg, #3 = 40 deg, # 4 = 40 deg, #5 = 30 deg. PIP flexion: #2 = 40 deg, # 3 = 50 deg, # 4 = 52 deg, # 5 = 50 deg,   DIP fleixon: #2 = 40 deg, # 3 = 30 deg, # 4 = 30 deg, # 5 = 28 deg. Fingernail to proximal palmar crease distances: #2 = 6cm, # 3 = 6 cm, # 4 = 5.5 cm, # 5 = 4.3 cm. Post treatment:   MCP flexion : #2 = 60 deg, # 3 = 55 deg  PIP fleixon: #2 = 88 deg, # 3 = 70 deg.    2/16/22:  Pre treatment:   R hand ROM's: MCP flexion# 2 = 54 deg, #3 = 44 deg, # 4 = 32 deg, #5 = 26 deg. PIP flexion: #2 = 40 deg, # 3 = 50 deg, # 4 = 55 deg, # 5 = 50 deg,   DIP fleixon: #2 = 32 deg, # 3 = 32 deg, # 4 = 42 deg, # 5 = 44 deg. Post treatment fingertipe to prox. Palmar crease distance: index = 4.6cm, middle = 4.6 cm. : #2: left = 16#, right = 26#, #4: left = 18#, right = 32#.   2/25/22: Wrist PROM's : flexion = 52 deg, extn = 38 deg, radial dev. = 12 deg, ulnar dev = 25 deg. Quick DASH = 32/55. 3/7/22: PIP flexion PROM: 2nd = 80 deg, 3rd = 82 deg, 4th = 82 deg, 5th = 85 deg.   3/11/22: Fingertip to prox. Palmar crease: Index = 3.5cm, Middle = 3.2cm    3/18/22: MCP flexion: #2: 80 deg P, #3: 75 deg P, # 4: 80 deg P  PIP flexion: #2: 65 deg P, #3: 788 deg P, #4: 80 deg P  Full fist fingertip to palm distance: #2: 4.7 cm A, 4.0cm P, #3 5.0cm A, 3.0 cm P, # 5: 4.9 cm A, 3.3 cm P. Wrist AROM/PROM R: flexion : 40 deg A, 43 deg. P, Extension: 40 deg A, 45 deg P. Quick Dash: 25/55  3/25/22: R : # 2 = 15#, # 4 = 19#.       4/13/22: MCP flexion: #2: 70 deg A, 85 deg P, #3: 60 deg A, 80 deg P, # 4: 77 deg P  PIP flexion: #2: 65 deg A, 75 deg P, #3: 90 deg P, #4: 95 deg P    Wrist AROM R: flexion : 60 deg A, , Extension: 50 deg A, Radial deviation 20 deg A, Ulnar deviation = 30 deg A       TREATMENT:   THERAPEUTIC ACTIVITY: ( see below for minutes): Therapeutic activities per grid below to improve mobility, strength, balance and coordination. Required minimal visual, verbal, manual and tactile cues to improve independence and safety with daily activities . THERAPEUTIC EXERCISE: (see below for minutes):  Exercises per grid below to improve mobility, strength, balance and coordination. Required minimal verbal and manual cues to promote proper body alignment, promote proper body posture and promote proper body mechanics. Progressed resistance, range, repetitions and complexity of movement as indicated.   MANUAL THERAPY: (see below for minutes): Joint mobilization and Soft tissue mobilization was utilized and necessary because of the patient's restricted joint motion, painful spasm, loss of articular motion and restricted motion of soft tissue. MODALITIES: (see below for minutes):      to decrease pain    SELF CARE: (see below for minutes): Procedure(s) (per grid) utilized to improve and/or restore self-care/home management as related to dressing, bathing and grooming. Required minimal verbal cueing to facilitate activities of daily living skills and compensatory activities. Date: 4/13/22 (visit 64) 4/18/22 (visit 62) 4/20/22 (visit 62) 4/25/22 (visit 61) 4/27/22 (visit 60)    Modalities:                                    Therapeutic Exercise: 40 min 41 min 40 min 38 min 40 min     Flexor stretching; MCP's at2-3-4, Composite flexion at MCP-PIP-DIP, hook fist and full fists - all with sustained 20-30\" holds Flexor stretching: MCP's @ 2-3-4-5 w/ 30\"holds x 4, Composite flexion at ip's (hook fist), MCP/PIP (open fist) and full fist  W/ 15-20\" holds: total x 20 min Flexion stretching w /static holds at [employer], PIP's and composite hook and full fist postions - individual at digits 2-5 and combined. Extension stretch at PIP's: 1 min each digits 2-5 Progressive composite flexion stretching using dowels wt active gripping 10 rep sets and static holds (1.5\", 1.25\", 1\", 3/4\"): 20  min     Manual resist full finger flexion  X 20 (fingers 2-5) End range stretch 1 min each each wrist flexion, wrist extn.   3/4\" dowel gripping x 20 R finger flexion stretching: static holds at MCP's 2-5, open fist static stretch 2-5, full fist at 2-3 Wrist extension stretch: 2 min     Thumb flexion stretch x 1 min Thumb (CMC-MCP-IP) combined flexion x 1 min, thumb abduction x 1 min End range stretch: 1 min each wrist flexion, wrist extension Gold gripper squeezes x 40 Therabar green bilat supinations 2 x 15 -      Passive end range stretching : wrist flexion/extension: 1 min each Theraputty red: hook fist x 30, thumb flexion x 20 Thumb CMC abduction, flexion and combined thumb full flexion to palm : 3 min Thumb CMC, PIP flexion static stretch: 2 min Therabar red pronations bilat 2 x 15     Manl resist 2 x 15: wrist flexion, extension, ulnar dev, radial dev. Wrist curls: 2 x 20 @ 3#, reverse curls 2 x 20 @ 3#, radial dev 2 x 20 @ 3# Red Therabar: 30x wrist bilat flexion/twists, 30x bilat pronation, 30x bilat supination Finger rolling arom/coordination x 20 R pronation twists w/ therabar red x 15     Putty (red): hoook fist x 20, thumb flexion x 15. Brachioradialis R elbow curls x 30 @ 7#,  Biceps curls partial range R 7# x 30 Manl. Resist : DIP flexion/holds and isolated curls: 30 total at digits 2-5 Alternating wrist flexion/opposite extension using therabar ; x 20       Biceps R curls 30 @ 5# Rubber band gripper : 4 x 10  Green fingerweb DIP finger flexions /w/ 2\" holds x 20 R brachioradialis  curls x 30 @ 6#. Therabar: : green supinations x 30 bilat, pronations w/ red x 30 bilat Manl resist R wrist extensions x 30 Biceps curls w/ 6# x 30,  Palm rolling on therabar: 1 min      Rowing w/ black tubing x 30 bilat Theraweb gripping for DIP flexions x 30  Wrist extn x 30 w/ 6# Finger extensions stretching digits 2-5 x 1 min      Standing bilat punches w/ black tubing x 30 bilat.   Theraweb finger abductions x 30  R pronation/supination w/ 6#  X 20 Distal phalanx flexion isometrics: 2 x 10 x 5\"        Green therabar rapid radial/ulnar deviations x 50                Proprioceptive Activities:                                    Manual Therapy:   3 min 2 min        STM palmar proximal phalanges to mid palms x 3 min STM: palmar MCP's x 2 min                                Therapeutic Activities:                                      HEP: Exercises    4/6/22:   Finger MP Flexion AROM - 1 x daily - 7 x weekly - 3 sets - 15 reps  Half Fist AROM - 1 x daily - 7 x weekly - 3 sets - 15 reps  Thumb Abduction AROM on Table - 1 x daily - 7 x weekly - 3 sets - 15 reps  Seated Finger Composite Flexion Stretch - 1 x daily - 7 x weekly - 3 sets - 30 second hold  Hand PROM Finger Extension - 1 x daily - 7 x weekly - 3 sets - 30 seconds hold  Wrist Flexor Stretch in Pronation - 1 x daily - 7 x weekly - 3 sets - 30 seconds hold  Seated Thumb MCP PROM - 1 x daily - 7 x weekly - 3 sets - 30 seocnds hold  Gripping Sponge Supinated - 1 x daily - 7 x weekly - 3 sets - 15 reps      [facility] Portal  Treatment/Session Summary:    · Response to Treatment: Discussed overall progress, plans going forward, including changing to 1x/week PT - will aim for this next week   · Communication/Consultation:  None today  · Equipment provided today:  None today   · Recommendations/Intent for next treatment session: Emphasis on a/p/rrom to regain flexor tendon/finger flexion RODRIGEZ and thumb opposition for normal /grasp, pronation/supination. Manual therapy for joint and tendon mobility.      Total Treatment Billable Duration:  40 minutes  PT Patient Time In/Time Out  Time In: 5359  Time Out: 1612 [facility],     Future Appointments   Date Time Provider Kamille Reis   5/2/2022 11:00 AM Johana Murray, PT [facility]   5/4/2022 11:00 AM Johana Murray PT [facility]   5/11/2022 11:00 AM Johana Murray, PT [facility]   5/13/2022 11:00 AM Johana Murray PT [facility]   5/16/2022 11:00 AM Johana Murray, PT [facility]   5/18/2022 11:00 AM Johana Murray PT [facility]   5/23/2022 11:00 AM Johana Murray PT [facility]   5/25/2022 11:00 AM Conchita Fabian, PT [facility]

## 2022-05-02 ENCOUNTER — HOSPITAL ENCOUNTER (OUTPATIENT)
Dept: PHYSICAL THERAPY | Age: 82
Discharge: HOME | End: 2022-05-02
Payer: COMMERCIAL

## 2022-05-02 PROCEDURE — 97110 THERAPEUTIC EXERCISES: CPT

## 2022-05-02 NOTE — PROGRESS NOTES
Minerva Black  : 1940  Primary: [employer] [place]  Secondary:  [address]  [phone]   [phone]      OUTPATIENT PHYSICAL THERAPY:  Daily Treatment Note  2022   ICD-10: Treatment Diagnosis: Pain in right wrist (M25.531), Stiffness of right wrist, not elsewhere classified (M25.631), Pain in left wrist (M25.532), Stiffness of left wrist, not elsewhere classified (M25.632), Stiffness of right hand, not elsewhere classified (M25.641) and Stiffness of left hand, not elsewhere classified (M25.642)  MEDICAL/REFERRING DIAGNOSIS:  Status post bilateral distal radius fractures   TREATMENT PLAN:  Effective Dates: 3/7/22 to 22 (60 days). Frequency/Duration: 2 times a week for 60 Days  GOALS: (Goals have been discussed and agreed upon with patient.)  Short-Term Functional Goals: Time Frame: 4 weeks:   1. Independent performance of home exercise program (MET)  2. Reduce R hand edema to normal levels to allow normal finger to palm fist motions (MET)  3. Increase R wrist/hand ROM's to 75% or better of normal  In all planes of motion (MET)  Discharge Goals: Time Frame: 12 weeks  1. Pt to demonstrate at least 35#   Strength L/R for normal weight carrying (progressing)  2. Pt to demonstrate L/R wrist/ hand ROM's 85% or better of normal all planes (progressing)  3. Improve Quick-DASH scores to 15/55 or better to reflect return to normal ADL function (progressing)  Rehabilitation Potential For Stated Goals: Good  _______________________________________________________________________  Pre-treatment Symptoms/Complaints: Pt notes that she's using her hand more with gardening - still does some tasks more left handed now. Ring finger and index finger functioning better, able to flex more, but middle finger still stiff.  .     Pain: Initial: 3/10 Post Session:  No increase/10   Medications Last Reviewed: 5/2/2022  Updated Objective Findings:    Observation/Orthostatic Postural Assessment:    3/9/22: minimal R digit swelling. Palpation:    Diffuse right digital swelling, mild dorsal R hand swelling (12/29/2021)  ROM:    Left : Pt demonstrates ability to perform full left hook, open and full fists. AROM's : wrist flexion = 40 deg, extension = 45 deg, pronation/supination 75% of full AROM, Ulnar deviation = 18 deg, radial deviation = 20 deg. Right: Passive Everlina Aver digit ROM's: 2: passive RODRIGEZ now 75%. (12/22/2021), MCP flexion at index = 90 deg (1/22/22) , Thumb IP = full extension, 50% flexion, MCP = full extension, 50% flexion. CMC: 25% flexion. CMC radial abduction 40 deg. Wrist flexion P/AROM = 55 deg (1/5/2022), extension A/PROM = 40 deg/48 deg  Deg (1/5/22), Ulnar deviation = 15 deg PROM, radial deviation = to 15 deg. R forearm active/passive. pronation/supination  = ~ 60 deg to 75 deg (12/29/2021). [id number] palmar crease measures: R hand: index and ring 4.5 cm (1/31/22)  Strength:   Left:  digits all 4-/5, Wrist flex/ext all 4/5  Right: digits all 3-/5, wrist flex/ext 4-/5   (1/19/22): 3rd run dynamometer: left = 32 #, right = 15#. 1/28/22: right = 15#    2/7/22: Fingernail to palm finger flexion measures R hand: Index = 3.5cm, Middle: 3.5 cm, Ring: 3.4 cm, 5th finger: 3.4 cm. Able to flex each MCP at digits 2-5 to 90 deg, and at each PIP to 90 deg, each w/stiff end feel. Able to lateral pinch with index to middle phalanx and tip, able to pinch to tip of middle digit with effort.  strength at 3rd rung = 17#. Quick-DASH score: 29/55  2/9/22: Pre treatment:   R hand ROM's: MCP flexion# 2 = 40 deg, #3 = 40 deg, # 4 = 40 deg, #5 = 30 deg. PIP flexion: #2 = 40 deg, # 3 = 50 deg, # 4 = 52 deg, # 5 = 50 deg,   DIP fleixon: #2 = 40 deg, # 3 = 30 deg, # 4 = 30 deg, # 5 = 28 deg. Fingernail to proximal palmar crease distances: #2 = 6cm, # 3 = 6 cm, # 4 = 5.5 cm, # 5 = 4.3 cm.    Post treatment:   MCP flexion : #2 = 60 deg, # 3 = 55 deg  PIP fleixon: #2 = 88 deg, # 3 = 70 deg. 2/16/22:  Pre treatment:   R hand ROM's: MCP flexion# 2 = 54 deg, #3 = 44 deg, # 4 = 32 deg, #5 = 26 deg. PIP flexion: #2 = 40 deg, # 3 = 50 deg, # 4 = 55 deg, # 5 = 50 deg,   DIP fleixon: #2 = 32 deg, # 3 = 32 deg, # 4 = 42 deg, # 5 = 44 deg. Post treatment fingertipe to prox. Palmar crease distance: index = 4.6cm, middle = 4.6 cm. : #2: left = 16#, right = 26#, #4: left = 18#, right = 32#.   2/25/22: Wrist PROM's : flexion = 52 deg, extn = 38 deg, radial dev. = 12 deg, ulnar dev = 25 deg. Quick DASH = 32/55. 3/7/22: PIP flexion PROM: 2nd = 80 deg, 3rd = 82 deg, 4th = 82 deg, 5th = 85 deg.   3/11/22: Fingertip to prox. Palmar crease: Index = 3.5cm, Middle = 3.2cm    3/18/22: MCP flexion: #2: 80 deg P, #3: 75 deg P, # 4: 80 deg P  PIP flexion: #2: 65 deg P, #3: 788 deg P, #4: 80 deg P  Full fist fingertip to palm distance: #2: 4.7 cm A, 4.0cm P, #3 5.0cm A, 3.0 cm P, # 5: 4.9 cm A, 3.3 cm P. Wrist AROM/PROM R: flexion : 40 deg A, 43 deg. P, Extension: 40 deg A, 45 deg P. Quick Dash: 25/55  3/25/22: R : # 2 = 15#, # 4 = 19#.       4/13/22: MCP flexion: #2: 70 deg A, 85 deg P, #3: 60 deg A, 80 deg P, # 4: 77 deg P  PIP flexion: #2: 65 deg A, 75 deg P, #3: 90 deg P, #4: 95 deg P    Wrist AROM R: flexion : 60 deg A, , Extension: 50 deg A, Radial deviation 20 deg A, Ulnar deviation = 30 deg A       TREATMENT:   THERAPEUTIC ACTIVITY: ( see below for minutes): Therapeutic activities per grid below to improve mobility, strength, balance and coordination. Required minimal visual, verbal, manual and tactile cues to improve independence and safety with daily activities . THERAPEUTIC EXERCISE: (see below for minutes):  Exercises per grid below to improve mobility, strength, balance and coordination.   Required minimal verbal and manual cues to promote proper body alignment, promote proper body posture and promote proper body mechanics. Progressed resistance, range, repetitions and complexity of movement as indicated. MANUAL THERAPY: (see below for minutes): Joint mobilization and Soft tissue mobilization was utilized and necessary because of the patient's restricted joint motion, painful spasm, loss of articular motion and restricted motion of soft tissue. MODALITIES: (see below for minutes):      to decrease pain    SELF CARE: (see below for minutes): Procedure(s) (per grid) utilized to improve and/or restore self-care/home management as related to dressing, bathing and grooming. Required minimal verbal cueing to facilitate activities of daily living skills and compensatory activities. Date: 4/13/22 (visit 64) 4/18/22 (visit 62) 4/20/22 (visit 62) 4/25/22 (visit 61) 4/27/22 (visit 61) 5/2/22 (visit 64): Modalities:                                    Therapeutic Exercise: 40 min 41 min 40 min 38 min 40 min 41 min    Flexor stretching; MCP's at2-3-4, Composite flexion at MCP-PIP-DIP, hook fist and full fists - all with sustained 20-30\" holds Flexor stretching: MCP's @ 2-3-4-5 w/ 30\"holds x 4, Composite flexion at ip's (hook fist), MCP/PIP (open fist) and full fist  W/ 15-20\" holds: total x 20 min Flexion stretching w /static holds at [employer], PIP's and composite hook and full fist postions - individual at digits 2-5 and combined. Extension stretch at PIP's: 1 min each digits 2-5 Progressive composite flexion stretching using dowels wt active gripping 10 rep sets and static holds (1.5\", 1.25\", 1\", 3/4\"): 20  min Assisted flexor tendon glide AROM digits 2-5    Manual resist full finger flexion  X 20 (fingers 2-5) End range stretch 1 min each each wrist flexion, wrist extn.   3/4\" dowel gripping x 20 R finger flexion stretching: static holds at MCP's 2-5, open fist static stretch 2-5, full fist at 2-3 Wrist extension stretch: 2 min Static holds digits 2-3-4 : 3 x 30\", f/b assisted end range flexion stretch holds: full flexion, hook fist     Thumb flexion stretch x 1 min Thumb (CMC-MCP-IP) combined flexion x 1 min, thumb abduction x 1 min End range stretch: 1 min each wrist flexion, wrist extension Gold gripper squeezes x 40 Therabar green bilat supinations 2 x 15 -  Manl resist hook fist  X 15, f/b blue putty resisted hook fists x 15    Passive end range stretching : wrist flexion/extension: 1 min each Theraputty red: hook fist x 30, thumb flexion x 20 Thumb CMC abduction, flexion and combined thumb full flexion to palm : 3 min Thumb CMC, PIP flexion static stretch: 2 min Therabar red pronations bilat 2 x 15 R thumb flexion w/ putty resist x 15    Manl resist 2 x 15: wrist flexion, extension, ulnar dev, radial dev. Wrist curls: 2 x 20 @ 3#, reverse curls 2 x 20 @ 3#, radial dev 2 x 20 @ 3# Red Therabar: 30x wrist bilat flexion/twists, 30x bilat pronation, 30x bilat supination Finger rolling arom/coordination x 20 R pronation twists w/ therabar red x 15 Green theraball gripping x 40    Putty (red): hoook fist x 20, thumb flexion x 15. Brachioradialis R elbow curls x 30 @ 7#,  Biceps curls partial range R 7# x 30 Manl. Resist : DIP flexion/holds and isolated curls: 30 total at digits 2-5 Alternating wrist flexion/opposite extension using therabar ; x 20  Wrist flexion, wrist extn: 2 x 30\" each      Biceps R curls 30 @ 5# Rubber band gripper : 4 x 10  Green fingerweb DIP finger flexions /w/ 2\" holds x 20 R brachioradialis  curls x 30 @ 6#. Dowel pronation/supination x 20     Therabar: : green supinations x 30 bilat, pronations w/ red x 30 bilat Manl resist R wrist extensions x 30 Biceps curls w/ 6# x 30,  Palm rolling on therabar: 1 min Clothes pin squeezes R: index to thumb, middle to thumb: 20 each.       Rowing w/ black tubing x 30 bilat Theraweb gripping for DIP flexions x 30  Wrist extn x 30 w/ 6# Finger extensions stretching digits 2-5 x 1 min Wrist curls, reverse curls: 30 each @ 2#     Standing bilat punches w/ black tubing x 30 bilat. Theraweb finger abductions x 30  R pronation/supination w/ 6#  X 20 Distal phalanx flexion isometrics: 2 x 10 x 5\"  Brachioradialis curls w/ 6# x 25      Green therabar rapid radial/ulnar deviations x 50                Proprioceptive Activities:                                    Manual Therapy:   3 min 2 min        STM palmar proximal phalanges to mid palms x 3 min STM: palmar MCP's x 2 min                                Therapeutic Activities:                                      HEP: Exercises    4/6/22:   Finger MP Flexion AROM - 1 x daily - 7 x weekly - 3 sets - 15 reps  Half Fist AROM - 1 x daily - 7 x weekly - 3 sets - 15 reps  Thumb Abduction AROM on Table - 1 x daily - 7 x weekly - 3 sets - 15 reps  Seated Finger Composite Flexion Stretch - 1 x daily - 7 x weekly - 3 sets - 30 second hold  Hand PROM Finger Extension - 1 x daily - 7 x weekly - 3 sets - 30 seconds hold  Wrist Flexor Stretch in Pronation - 1 x daily - 7 x weekly - 3 sets - 30 seconds hold  Seated Thumb MCP PROM - 1 x daily - 7 x weekly - 3 sets - 30 seocnds hold  Gripping Sponge Supinated - 1 x daily - 7 x weekly - 3 sets - 15 reps      [employer] [facility] Portal  Treatment/Session Summary:    · Response to Treatment: Index, middle and ring finger nearly flexable to palm, but active  lags with each digit by about 25%. · Communication/Consultation:  None today  · Equipment provided today:  None today   · Recommendations/Intent for next treatment session: Emphasis on a/p/rrom to regain flexor tendon/finger flexion RODRIGEZ and thumb opposition for normal /grasp, pronation/supination. Manual therapy for joint and tendon mobility.      Total Treatment Billable Duration:  41 minutes  PT Patient Time In/Time Out  Time In: 1100  Time Out: Suraj Borden PT    Future Appointments   Date Time Provider Kamille Smiley   5/4/2022 11:00 AM Sander Roa, PT [facility]   5/11/2022 11:00 AM Fanny Barragan, PT [facility]   5/13/2022 11:00 AM Antonio Oglesby, PT [facility]   5/16/2022 11:00 AM Antonio Oglesby, PT [facility]   5/18/2022 11:00 AM Antonio Oglesby, PT [facility]   5/23/2022 11:00 AM Antonio Oglesby, PT [facility]   5/25/2022 11:00 AM Abena Alanis, PT [facility]

## 2022-05-04 ENCOUNTER — HOSPITAL ENCOUNTER (OUTPATIENT)
Dept: PHYSICAL THERAPY | Age: 82
Discharge: HOME | End: 2022-05-04
Payer: COMMERCIAL

## 2022-05-04 PROCEDURE — 97110 THERAPEUTIC EXERCISES: CPT

## 2022-05-04 NOTE — PROGRESS NOTES
Eduardo Black  : 1940  Primary: [facility] [place] [facility] Briana*  Secondary:  [address] @ [address], [place].  [phone]   [phone]      OUTPATIENT PHYSICAL THERAPY:  Daily Treatment Note  2022   ICD-10: Treatment Diagnosis: Pain in right wrist (M25.531), Stiffness of right wrist, not elsewhere classified (M25.631), Pain in left wrist (M25.532), Stiffness of left wrist, not elsewhere classified (M25.632), Stiffness of right hand, not elsewhere classified (M25.641) and Stiffness of left hand, not elsewhere classified (M25.642)  MEDICAL/REFERRING DIAGNOSIS:  Status post bilateral distal radius fractures   TREATMENT PLAN:  Effective Dates: 3/7/22 to 22 (60 days). Frequency/Duration: 2 times a week for 60 Days  GOALS: (Goals have been discussed and agreed upon with patient.)  Short-Term Functional Goals: Time Frame: 4 weeks:   1. Independent performance of home exercise program (MET)  2. Reduce R hand edema to normal levels to allow normal finger to palm fist motions (MET)  3. Increase R wrist/hand ROM's to 75% or better of normal  In all planes of motion (MET)  Discharge Goals: Time Frame: 12 weeks  1. Pt to demonstrate at least 35#   Strength L/R for normal weight carrying (progressing)  2. Pt to demonstrate L/R wrist/ hand ROM's 85% or better of normal all planes (progressing)  3. Improve Quick-DASH scores to 15/55 or better to reflect return to normal ADL function (progressing)  Rehabilitation Potential For Stated Goals: Good  _______________________________________________________________________  Pre-treatment Symptoms/Complaints: Pt  State that she was able to  a 25# bag of cat litter using both hands  and carry a 12 pack of coke in each hand - improving.    Pain: Initial: 3/10 Post Session:  No increase/10   Medications Last Reviewed:  2022  Updated Objective Findings:    Observation/Orthostatic Postural Assessment:    3/9/22: minimal R digit swelling. Palpation:    Diffuse right digital swelling, mild dorsal R hand swelling (12/29/2021)  ROM:    Left : Pt demonstrates ability to perform full left hook, open and full fists. AROM's : wrist flexion = 40 deg, extension = 45 deg, pronation/supination 75% of full AROM, Ulnar deviation = 18 deg, radial deviation = 20 deg. Right: Passive José Katalina digit ROM's: 2: passive RODRIGEZ now 75%. (12/22/2021), MCP flexion at index = 90 deg (1/22/22) , Thumb IP = full extension, 50% flexion, MCP = full extension, 50% flexion. CMC: 25% flexion. CMC radial abduction 40 deg. Wrist flexion P/AROM = 55 deg (1/5/2022), extension A/PROM = 40 deg/48 deg  Deg (1/5/22), Ulnar deviation = 15 deg PROM, radial deviation = to 15 deg. R forearm active/passive. pronation/supination  = ~ 60 deg to 75 deg (12/29/2021). [id number]-[id number] palmar crease measures: R hand: index and ring 4.5 cm (1/31/22)  Strength:   Left:  digits all 4-/5, Wrist flex/ext all 4/5  Right: digits all 3-/5, wrist flex/ext 4-/5   (1/19/22): 3rd run dynamometer: left = 32 #, right = 15#. 1/28/22: right = 15#    2/7/22: Fingernail to palm finger flexion measures R hand: Index = 3.5cm, Middle: 3.5 cm, Ring: 3.4 cm, 5th finger: 3.4 cm. Able to flex each MCP at digits 2-5 to 90 deg, and at each PIP to 90 deg, each w/stiff end feel. Able to lateral pinch with index to middle phalanx and tip, able to pinch to tip of middle digit with effort.  strength at 3rd rung = 17#. Quick-DASH score: 29/55  2/9/22: Pre treatment:   R hand ROM's: MCP flexion# 2 = 40 deg, #3 = 40 deg, # 4 = 40 deg, #5 = 30 deg. PIP flexion: #2 = 40 deg, # 3 = 50 deg, # 4 = 52 deg, # 5 = 50 deg,   DIP fleixon: #2 = 40 deg, # 3 = 30 deg, # 4 = 30 deg, # 5 = 28 deg. Fingernail to proximal palmar crease distances: #2 = 6cm, # 3 = 6 cm, # 4 = 5.5 cm, # 5 = 4.3 cm.    Post treatment:   MCP flexion : #2 = 60 deg, # 3 = 55 deg  PIP fleixon: #2 = 88 deg, # 3 = 70 deg. 2/16/22:  Pre treatment:   R hand ROM's: MCP flexion# 2 = 54 deg, #3 = 44 deg, # 4 = 32 deg, #5 = 26 deg. PIP flexion: #2 = 40 deg, # 3 = 50 deg, # 4 = 55 deg, # 5 = 50 deg,   DIP fleixon: #2 = 32 deg, # 3 = 32 deg, # 4 = 42 deg, # 5 = 44 deg. Post treatment fingertipe to prox. Palmar crease distance: index = 4.6cm, middle = 4.6 cm. : #2: left = 16#, right = 26#, #4: left = 18#, right = 32#.   2/25/22: Wrist PROM's : flexion = 52 deg, extn = 38 deg, radial dev. = 12 deg, ulnar dev = 25 deg. Quick DASH = 32/55. 3/7/22: PIP flexion PROM: 2nd = 80 deg, 3rd = 82 deg, 4th = 82 deg, 5th = 85 deg.   3/11/22: Fingertip to prox. Palmar crease: Index = 3.5cm, Middle = 3.2cm    3/18/22: MCP flexion: #2: 80 deg P, #3: 75 deg P, # 4: 80 deg P  PIP flexion: #2: 65 deg P, #3: 788 deg P, #4: 80 deg P  Full fist fingertip to palm distance: #2: 4.7 cm A, 4.0cm P, #3 5.0cm A, 3.0 cm P, # 5: 4.9 cm A, 3.3 cm P. Wrist AROM/PROM R: flexion : 40 deg A, 43 deg. P, Extension: 40 deg A, 45 deg P. Quick Dash: 25/55  3/25/22: R : # 2 = 15#, # 4 = 19#.       4/13/22: MCP flexion: #2: 70 deg A, 85 deg P, #3: 60 deg A, 80 deg P, # 4: 77 deg P  PIP flexion: #2: 65 deg A, 75 deg P, #3: 90 deg P, #4: 95 deg P    Wrist AROM R: flexion : 60 deg A, , Extension: 50 deg A, Radial deviation 20 deg A, Ulnar deviation = 30 deg A  5/4/22: fingeertip to pal during full finger flexion: #2: 4.5cm A, 3.2 cm P, # 3: 4.5 cm A, 3.2 cm P, # 4: 4.5cm A, 3 cm P, # 5: 3 cm A, 2.5cm P.      TREATMENT:   THERAPEUTIC ACTIVITY: ( see below for minutes): Therapeutic activities per grid below to improve mobility, strength, balance and coordination. Required minimal visual, verbal, manual and tactile cues to improve independence and safety with daily activities . THERAPEUTIC EXERCISE: (see below for minutes):  Exercises per grid below to improve mobility, strength, balance and coordination.   Required minimal verbal and manual cues to promote proper body alignment, promote proper body posture and promote proper body mechanics. Progressed resistance, range, repetitions and complexity of movement as indicated. MANUAL THERAPY: (see below for minutes): Joint mobilization and Soft tissue mobilization was utilized and necessary because of the patient's restricted joint motion, painful spasm, loss of articular motion and restricted motion of soft tissue. MODALITIES: (see below for minutes):      to decrease pain    SELF CARE: (see below for minutes): Procedure(s) (per grid) utilized to improve and/or restore self-care/home management as related to dressing, bathing and grooming. Required minimal verbal cueing to facilitate activities of daily living skills and compensatory activities. Date: 4/13/22 (visit 64) 4/18/22 (visit 62) 4/20/22 (visit 62) 4/25/22 (visit 61) 4/27/22 (visit 60) 5/2/22 (visit 61):  5/4/22 (visit 58)   Modalities:                                        Therapeutic Exercise: 40 min 41 min 40 min 38 min 40 min 41 min 41 min    Flexor stretching; MCP's at2-3-4, Composite flexion at MCP-PIP-DIP, hook fist and full fists - all with sustained 20-30\" holds Flexor stretching: MCP's @ 2-3-4-5 w/ 30\"holds x 4, Composite flexion at ip's (hook fist), MCP/PIP (open fist) and full fist  W/ 15-20\" holds: total x 20 min Flexion stretching w /static holds at [facility], PIP's and composite hook and full fist postions - individual at digits 2-5 and combined. Extension stretch at PIP's: 1 min each digits 2-5 Progressive composite flexion stretching using [employer] wt active gripping 10 rep sets and static holds (1.5\", 1.25\", 1\", 3/4\"): 20  min Assisted flexor tendon glide AROM digits 2-5 Hand gripper resisted (2 rubber band resist): 20 x 5\"    Manual resist full finger flexion  X 20 (fingers 2-5) End range stretch 1 min each each wrist flexion, wrist extn.   3/4\" dowel gripping x 20 R finger flexion stretching: static holds at MCP's 2-5, open fist static stretch 2-5, full fist at 2-3 Wrist extension stretch: 2 min Static holds digits 2-3-4 : 3 x 30\", f/b assisted end range flexion stretch holds: full flexion, hook fist  R finger flexion stretching: static holds at DIP's 2 and 3, full flexion and hook fist static stretching : 15 min    Thumb flexion stretch x 1 min Thumb (CMC-MCP-IP) combined flexion x 1 min, thumb abduction x 1 min End range stretch: 1 min each wrist flexion, wrist extension Gold gripper squeezes x 40 Therabar green bilat supinations 2 x 15 -  Manl resist hook fist  X 15, f/b blue putty resisted hook fists x 15 Flexor tendon glides for hook fist, (scraping fingernails into table top x 15  lumbrical tables x 15, . Passive end range stretching : wrist flexion/extension: 1 min each Theraputty red: hook fist x 30, thumb flexion x 20 Thumb CMC abduction, flexion and combined thumb full flexion to palm : 3 min Thumb CMC, PIP flexion static stretch: 2 min Therabar red pronations bilat 2 x 15 R thumb flexion w/ putty resist x 15 Manl. Resist IIP flexion 2-5\": 2 x 20    Manl resist 2 x 15: wrist flexion, extension, ulnar dev, radial dev. Wrist curls: 2 x 20 @ 3#, reverse curls 2 x 20 @ 3#, radial dev 2 x 20 @ 3# Red Therabar: 30x wrist bilat flexion/twists, 30x bilat pronation, 30x bilat supination Finger rolling arom/coordination x 20 R pronation twists w/ therabar red x 15 Green theraball gripping x 40 Passive L thumb stretches: IP flexion, MCP flexion, CMC abduction : 3 min    Putty (red): hoook fist x 20, thumb flexion x 15. Brachioradialis R elbow curls x 30 @ 7#,  Biceps curls partial range R 7# x 30 Manl.  Resist : DIP flexion/holds and isolated curls: 30 total at digits 2-5 Alternating wrist flexion/opposite extension using therabar ; x 20  Wrist flexion, wrist extn: 2 x 30\" each  End range wrist extension stretch w/ hands on table-weightbearing x 1 min     Biceps R curls 30 @ 5# Rubber band gripper : 4 x 10  Green fingerweb DIP finger flexions /w/ 2\" holds x 20 R brachioradialis  curls x 30 @ 6#. Dowel pronation/supination x 20 2# hammer pronation/supination: 2  x15     Therabar: : green supinations x 30 bilat, pronations w/ red x 30 bilat Manl resist R wrist extensions x 30 Biceps curls w/ 6# x 30,  Palm rolling on therabar: 1 min Clothes pin squeezes R: index to thumb, middle to thumb: 20 each. 2# hammer Radial deviation x 15     Rowing w/ black tubing x 30 bilat Theraweb gripping for DIP flexions x 30  Wrist extn x 30 w/ 6# Finger extensions stretching digits 2-5 x 1 min Wrist curls, reverse curls: 30 each @ 2# Resisted R elbow flexion: 7# biceps curls x 30, brachioradialis curls x 20     Standing bilat punches w/ black tubing x 30 bilat.   Theraweb finger abductions x 30  R pronation/supination w/ 6#  X 20 Distal phalanx flexion isometrics: 2 x 10 x 5\"  Brachioradialis curls w/ 6# x 25       Green therabar rapid radial/ulnar deviations x 50                  Proprioceptive Activities:                                        Manual Therapy:   3 min 2 min         STM palmar proximal phalanges to mid palms x 3 min STM: palmar MCP's x 2 min                                    Therapeutic Activities:                                          HEP: Exercises    4/6/22:   Finger MP Flexion AROM - 1 x daily - 7 x weekly - 3 sets - 15 reps  Half Fist AROM - 1 x daily - 7 x weekly - 3 sets - 15 reps  Thumb Abduction AROM on Table - 1 x daily - 7 x weekly - 3 sets - 15 reps  Seated Finger Composite Flexion Stretch - 1 x daily - 7 x weekly - 3 sets - 30 second hold  Hand PROM Finger Extension - 1 x daily - 7 x weekly - 3 sets - 30 seconds hold  Wrist Flexor Stretch in Pronation - 1 x daily - 7 x weekly - 3 sets - 30 seconds hold  Seated Thumb MCP PROM - 1 x daily - 7 x weekly - 3 sets - 30 seocnds hold  Gripping Sponge Supinated - 1 x daily - 7 x weekly - 3 sets - 15 reps      [employer] Portal  Treatment/Session Summary:    · Response to Treatment: extensor tendon tightness responding veyr slowly at this point. · Communication/Consultation:  None today  · Equipment provided today:  None today   · Recommendations/Intent for next treatment session: Emphasis on a/p/rrom to regain flexor tendon/finger flexion RODRIGEZ and thumb opposition for normal /grasp, pronation/supination. Manual therapy for joint and tendon mobility.      Total Treatment Billable Duration:  41 minutes  PT Patient Time In/Time Out  Time In: 1100  Time Out: 250 [facility], PT    Future Appointments   Date Time Provider Kamille Reis   5/11/2022 11:00 AM Esha Medici, PT [facility]   5/13/2022 11:00 AM Esha Medici, PT [facility]   5/16/2022 11:00 AM Esha Medici, PT [facility]   5/18/2022 11:00 AM [facility]   5/23/2022 11:00 AM Esha Medici, PT [facility]   5/25/2022 11:00 AM Jane Barragan, PT [facility]

## 2022-05-11 ENCOUNTER — HOSPITAL ENCOUNTER (OUTPATIENT)
Dept: PHYSICAL THERAPY | Age: 82
Discharge: HOME | End: 2022-05-11
Payer: COMMERCIAL

## 2022-05-11 PROCEDURE — 97110 THERAPEUTIC EXERCISES: CPT

## 2022-05-11 NOTE — THERAPY RECERTIFICATION
Arik Black  : 1940  Primary: [facility] [place]*  Secondary:  [address] @ [place] [address].  [phone]   [phone]      OUTPATIENT PHYSICAL THERAPY:  Berylrtlauren Jackson Peppers Note/ Daily Treatment Note  2022   ICD-10: Treatment Diagnosis: Pain in right wrist (M25.531), Stiffness of right wrist, not elsewhere classified (M25.631), Pain in left wrist (M25.532), Stiffness of left wrist, not elsewhere classified (M25.632), Stiffness of right hand, not elsewhere classified (M25.641) and Stiffness of left hand, not elsewhere classified (M25.642)  MEDICAL/REFERRING DIAGNOSIS:  Status post bilateral distal radius fractures   TREATMENT PLAN:  Effective Dates: 2022 to 2022 (30 days). Frequency/Duration: 1-2 times a week for 30 Days  GOALS: (Goals have been discussed and agreed upon with patient.)  Short-Term Functional Goals: Time Frame: 4 weeks:   1. Independent performance of home exercise program (MET)  2. Reduce R hand edema to normal levels to allow normal finger to palm fist motions (MET)  3. Increase R wrist/hand ROM's to 75% or better of normal  In all planes of motion (MET)  Discharge Goals: Time Frame: 16 weeks  1. Pt to demonstrate at least 35#   Strength L/R for normal weight carrying (progressing)  2. Pt to demonstrate L/R wrist/ hand ROM's 85% or better of normal all planes (progressing)  3. Improve Quick-DASH scores to 15/55 or better to reflect return to normal ADL function (progressing)  Rehabilitation Potential For Stated Goals: Good  Regarding Arik Black's therapy, I certify that the treatment plan above will be carried out by a therapist or under their direction.   Thank you for this referral,  King Tay PT       Referring Physician Signature: Aida Johnson MD                                            Date _______________________________________________________________________  Pre-treatment Symptoms/Complaints: Able to use hand more, finger flexion better, but her ability to manipulate objects with her fingers other than her index finger are limited. Pain: Initial: 3/10 Post Session:  No increase/10   Medications Last Reviewed:  5/11/2022  Updated Objective Findings:    Observation/Orthostatic Postural Assessment:    3/9/22: minimal R digit swelling. Palpation:    Diffuse right digital swelling, mild dorsal R hand swelling (12/29/2021)  ROM:    Left : Pt demonstrates ability to perform full left hook, open and full fists. AROM's : wrist flexion = 40 deg, extension = 45 deg, pronation/supination 75% of full AROM, Ulnar deviation = 18 deg, radial deviation = 20 deg. Right: Passive Caterina Po digit ROM's: 2: passive RODRIGEZ now 75%. (12/22/2021), MCP flexion at index = 90 deg (1/22/22) , Thumb IP = full extension, 50% flexion, MCP = full extension, 50% flexion. CMC: 25% flexion. CMC radial abduction 40 deg. Wrist flexion P/AROM = 55 deg (1/5/2022), extension A/PROM = 40 deg/48 deg  Deg (1/5/22), Ulnar deviation = 15 deg PROM, radial deviation = to 15 deg. R forearm active/passive. pronation/supination  = ~ 60 deg to 75 deg (12/29/2021). Burnevmjws-ck-lecixysd palmar crease measures: R hand: index and ring 4.5 cm (1/31/22)  Strength:   Left:  digits all 4-/5, Wrist flex/ext all 4/5  Right: digits all 3-/5, wrist flex/ext 4-/5   (1/19/22): 3rd run dynamometer: left = 32 #, right = 15#. 1/28/22: right = 15#    2/7/22: Fingernail to palm finger flexion measures R hand: Index = 3.5cm, Middle: 3.5 cm, Ring: 3.4 cm, 5th finger: 3.4 cm. Able to flex each MCP at digits 2-5 to 90 deg, and at each PIP to 90 deg, each w/stiff end feel. Able to lateral pinch with index to middle phalanx and tip, able to pinch to tip of middle digit with effort.  strength at 3rd rung = 17#.  Quick-DASH score: 29/55  2/9/22: Pre treatment:   R hand ROM's: MCP flexion# 2 = 40 deg, #3 = 40 deg, # 4 = 40 deg, #5 = 30 deg. PIP flexion: #2 = 40 deg, # 3 = 50 deg, # 4 = 52 deg, # 5 = 50 deg,   DIP fleixon: #2 = 40 deg, # 3 = 30 deg, # 4 = 30 deg, # 5 = 28 deg. Fingernail to proximal palmar crease distances: #2 = 6cm, # 3 = 6 cm, # 4 = 5.5 cm, # 5 = 4.3 cm. Post treatment:   MCP flexion : #2 = 60 deg, # 3 = 55 deg  PIP fleixon: #2 = 88 deg, # 3 = 70 deg. 2/16/22:  Pre treatment:   R hand ROM's: MCP flexion# 2 = 54 deg, #3 = 44 deg, # 4 = 32 deg, #5 = 26 deg. PIP flexion: #2 = 40 deg, # 3 = 50 deg, # 4 = 55 deg, # 5 = 50 deg,   DIP fleixon: #2 = 32 deg, # 3 = 32 deg, # 4 = 42 deg, # 5 = 44 deg. Post treatment fingertipe to prox. Palmar crease distance: index = 4.6cm, middle = 4.6 cm. : #2: left = 16#, right = 26#, #4: left = 18#, right = 32#.   2/25/22: Wrist PROM's : flexion = 52 deg, extn = 38 deg, radial dev. = 12 deg, ulnar dev = 25 deg. Quick DASH = 32/55. 3/7/22: PIP flexion PROM: 2nd = 80 deg, 3rd = 82 deg, 4th = 82 deg, 5th = 85 deg.   3/11/22: Fingertip to prox. Palmar crease: Index = 3.5cm, Middle = 3.2cm    3/18/22: MCP flexion: #2: 80 deg P, #3: 75 deg P, # 4: 80 deg P  PIP flexion: #2: 65 deg P, #3: 788 deg P, #4: 80 deg P  Full fist fingertip to palm distance: #2: 4.7 cm A, 4.0cm P, #3 5.0cm A, 3.0 cm P, # 5: 4.9 cm A, 3.3 cm P. Wrist AROM/PROM R: flexion : 40 deg A, 43 deg. P, Extension: 40 deg A, 45 deg P.    Quick Dash: 25/55  3/25/22: R : # 2 = 15#, # 4 = 19#.       4/13/22: MCP flexion: #2: 70 deg A, 85 deg P, #3: 60 deg A, 80 deg P, # 4: 77 deg P  PIP flexion: #2: 65 deg A, 75 deg P, #3: 90 deg P, #4: 95 deg P    Wrist AROM R: flexion : 60 deg A, , Extension: 50 deg A, Radial deviation 20 deg A, Ulnar deviation = 30 deg A  5/4/22: fingertip to palm during full finger flexion: #2: 4.5cm A, 3.2 cm P, # 3: 4.5 cm A, 3.2 cm P, # 4: 4.5cm A, 3 cm P, # 5: 3 cm A, 2.5cm P.      TREATMENT:   THERAPEUTIC ACTIVITY: ( see below for minutes): Therapeutic activities per grid below to improve mobility, strength, balance and coordination. Required minimal visual, verbal, manual and tactile cues to improve independence and safety with daily activities . THERAPEUTIC EXERCISE: (see below for minutes):  Exercises per grid below to improve mobility, strength, balance and coordination. Required minimal verbal and manual cues to promote proper body alignment, promote proper body posture and promote proper body mechanics. Progressed resistance, range, repetitions and complexity of movement as indicated. MANUAL THERAPY: (see below for minutes): Joint mobilization and Soft tissue mobilization was utilized and necessary because of the patient's restricted joint motion, painful spasm, loss of articular motion and restricted motion of soft tissue. MODALITIES: (see below for minutes):      to decrease pain    SELF CARE: (see below for minutes): Procedure(s) (per grid) utilized to improve and/or restore self-care/home management as related to dressing, bathing and grooming. Required minimal verbal cueing to facilitate activities of daily living skills and compensatory activities. Date: 4/18/22 (visit 62) 4/20/22 (visit 62) 4/25/22 (visit 61) 4/27/22 (visit 60) 5/2/22 (visit 61):  5/4/22 (visit 58) 5/11/22 (visit 63)   Modalities:                                        Therapeutic Exercise: 41 min 40 min 38 min 40 min 41 min 41 min 42 min    Flexor stretching: MCP's @ 2-3-4-5 w/ 30\"holds x 4, Composite flexion at ip's (hook fist), MCP/PIP (open fist) and full fist  W/ 15-20\" holds: total x 20 min Flexion stretching w /static holds at SDI [facility], PIP's and composite hook and full fist postions - individual at digits 2-5 and combined.   Extension stretch at PIP's: 1 min each digits 2-5 Progressive composite flexion stretching using [employer] [facility] active gripping 10 rep sets and static holds (1.5\", 1.25\", 1\", 3/4\"): 20  min Assisted flexor tendon glide AROM digits 2-5 Hand gripper resisted (2 rubber band resist): 20 x 5\" Finger flexion ROM: static hold stretch at MCP's with flexor tendon glides (digits 2-5) x 10 min    End range stretch 1 min each each wrist flexion, wrist extn. 3/4\" dowel gripping x 20 R finger flexion stretching: static holds at MCP's 2-5, open fist static stretch 2-5, full fist at 2-3 Wrist extension stretch: 2 min Static holds digits 2-3-4 : 3 x 30\", f/b assisted end range flexion stretch holds: full flexion, hook fist  R finger flexion stretching: static holds at DIP's 2 and 3, full flexion and hook fist static stretching : 15 min End range pip/dip flexion gripping: manl resist x 15, theraputty resist x 15     Thumb (CMC-MCP-IP) combined flexion x 1 min, thumb abduction x 1 min End range stretch: 1 min each wrist flexion, wrist extension Gold gripper squeezes x 40 Therabar green bilat supinations 2 x 15 -  Manl resist hook fist  X 15, f/b blue putty resisted hook fists x 15 Flexor tendon glides for hook fist, (scraping fingernails into table top x 15  lumbrical tables x 15, . Spring gripper  X 30 reps    Theraputty red: hook fist x 30, thumb flexion x 20 Thumb CMC abduction, flexion and combined thumb full flexion to palm : 3 min Thumb CMC, PIP flexion static stretch: 2 min Therabar red pronations bilat 2 x 15 R thumb flexion w/ putty resist x 15 Manl. Resist IIP flexion 2-5\": 2 x 20 Manl stretching end range wrist extn x 2 min, flexion x 1 min    Wrist curls: 2 x 20 @ 3#, reverse curls 2 x 20 @ 3#, radial dev 2 x 20 @ 3# Red Therabar: 30x wrist bilat flexion/twists, 30x bilat pronation, 30x bilat supination Finger rolling arom/coordination x 20 R pronation twists w/ therabar red x 15 Green theraball gripping x 40 Passive L thumb stretches: IP flexion, MCP flexion, CMC abduction : 3 min Theraputty thumb flexion x 15, thumb adduction x15     Brachioradialis R elbow curls x 30 @ 7#,  Biceps curls partial range R 7# x 30 Manl.  Resist : DIP flexion/holds and isolated curls: 30 total at digits 2-5 Alternating wrist flexion/opposite extension using therabar ; x 20  Wrist flexion, wrist extn: 2 x 30\" each  End range wrist extension stretch w/ hands on table-weightbearing x 1 min Hammer radial deviation (2#, short handle ) x 30    Biceps R curls 30 @ 5# Rubber band gripper : 4 x 10  Green fingerweb DIP finger flexions /w/ 2\" holds x 20 R brachioradialis  curls x 30 @ 6#. Dowel pronation/supination x 20 2# hammer pronation/supination: 2  x15 Hammer pronation/supination 2# with short handle  x 30    Therabar: : green supinations x 30 bilat, pronations w/ red x 30 bilat Manl resist R wrist extensions x 30 Biceps curls w/ 6# x 30,  Palm rolling on therabar: 1 min Clothes pin squeezes R: index to thumb, middle to thumb: 20 each. 2# hammer Radial deviation x 15 Biceps curls 6# x 30    Rowing w/ black tubing x 30 bilat Theraweb gripping for DIP flexions x 30  Wrist extn x 30 w/ 6# Finger extensions stretching digits 2-5 x 1 min Wrist curls, reverse curls: 30 each @ 2# Resisted R elbow flexion: 7# biceps curls x 30, brachioradialis curls x 20 Wrist flexion curls 6 # x 30    Standing bilat punches w/ black tubing x 30 bilat. Theraweb finger abductions x 30  R pronation/supination w/ 6#  X 20 Distal phalanx flexion isometrics: 2 x 10 x 5\"  Brachioradialis curls w/ 6# x 25  Wrist extn.  Curls 3# x 30      Green therabar rapid radial/ulnar deviations x 50                   Proprioceptive Activities:                                        Manual Therapy:  3 min 2 min         STM palmar proximal phalanges to mid palms x 3 min STM: palmar MCP's x 2 min                                     Therapeutic Activities:                                          HEP: Exercises    4/6/22:   Finger MP Flexion AROM - 1 x daily - 7 x weekly - 3 sets - 15 reps  Half Fist AROM - 1 x daily - 7 x weekly - 3 sets - 15 reps  Thumb Abduction AROM on Table - 1 x daily - 7 x weekly - 3 sets - 15 reps  Seated Finger Composite Flexion Stretch - 1 x daily - 7 x weekly - 3 sets - 30 second hold  Hand PROM Finger Extension - 1 x daily - 7 x weekly - 3 sets - 30 seconds hold  Wrist Flexor Stretch in Pronation - 1 x daily - 7 x weekly - 3 sets - 30 seconds hold  Seated Thumb MCP PROM - 1 x daily - 7 x weekly - 3 sets - 30 seocnds hold  Gripping Sponge Supinated - 1 x daily - 7 x weekly - 3 sets - 15 reps      [employer] Portal  Treatment/Session Summary:    · Response to Treatment: End stage of therapy - anticipate 1 more month before discharge to home program - final goals are to achieve consistent fingertip to palm  AROM. · Communication/Consultation:  None today  · Equipment provided today:  None today   · Recommendations/Intent for next treatment session: Emphasis on a/p/rrom to regain flexor tendon/finger flexion RODRIGEZ and thumb opposition for normal /grasp, pronation/supination. Manual therapy for joint and tendon mobility.      Total Treatment Billable Duration:  42 minutes  PT Patient Time In/Time Out  Time In: 1100  Time Out: [facility] [facility], PT    Future Appointments   Date Time Provider Kamille Reis   5/13/2022  7:00 PM Lacey Duran PT [facility]   5/16/2022 11:00 AM Lacey Duran PT [facility]   5/18/2022 11:00 AM Lacey Duran PT [facility]   5/23/2022 11:00 AM Lacey Duran PT [facility]   5/25/2022 11:00 AM [facility]

## 2022-05-13 ENCOUNTER — HOSPITAL ENCOUNTER (OUTPATIENT)
Dept: PHYSICAL THERAPY | Age: 82
Discharge: HOME | End: 2022-05-13
Payer: COMMERCIAL

## 2022-05-13 NOTE — PROGRESS NOTES
Pt cancelled appointment - is out of town until next week, will return at her next scheduled appointment.     Ivis Prado, PT,DPT,OCS,MTC

## 2022-05-16 ENCOUNTER — HOSPITAL ENCOUNTER (OUTPATIENT)
Dept: PHYSICAL THERAPY | Age: 82
Discharge: HOME | End: 2022-05-16
Payer: COMMERCIAL

## 2022-05-16 PROCEDURE — 97110 THERAPEUTIC EXERCISES: CPT

## 2022-05-16 NOTE — PROGRESS NOTES
Chipper Seip Pate  : 1940  Primary: [facility]*  Secondary:  [address]  [phone]   [phone]      OUTPATIENT PHYSICAL THERAPY: Daily Treatment Note  2022   ICD-10: Treatment Diagnosis: Pain in right wrist (M25.531), Stiffness of right wrist, not elsewhere classified (M25.631), Pain in left wrist (M25.532), Stiffness of left wrist, not elsewhere classified (M25.632), Stiffness of right hand, not elsewhere classified (M25.641) and Stiffness of left hand, not elsewhere classified (M25.642)  MEDICAL/REFERRING DIAGNOSIS:  Status post bilateral distal radius fractures   TREATMENT PLAN:  Effective Dates: 2022 to 2022 (30 days). Frequency/Duration: 1-2 times a week for 30 Days  GOALS: (Goals have been discussed and agreed upon with patient.)  Short-Term Functional Goals: Time Frame: 4 weeks:   1. Independent performance of home exercise program (MET)  2. Reduce R hand edema to normal levels to allow normal finger to palm fist motions (MET)  3. Increase R wrist/hand ROM's to 75% or better of normal  In all planes of motion (MET)  Discharge Goals: Time Frame: 16 weeks  1. Pt to demonstrate at least 35#   Strength L/R for normal weight carrying (progressing)  2. Pt to demonstrate L/R wrist/ hand ROM's 85% or better of normal all planes (progressing)  3. Improve Quick-DASH scores to 15/55 or better to reflect return to normal ADL function (progressing)  Rehabilitation Potential For Stated Goals: Good  _______________________________________________________________________  Pre-treatment Symptoms/Complaints: Strength in right arm /wrist/hand improving but finger mobility improves only temporarily after therapy treatments. Pt is able finally to use a knife to cut food with her right hand.    Pain: Initial: 3/10 Post Session:  No increase/10   Medications Last Reviewed: 5/16/2022  Updated Objective Findings:    Observation/Orthostatic Postural Assessment:    3/9/22: minimal R digit swelling. Palpation:    Diffuse right digital swelling, mild dorsal R hand swelling (12/29/2021)  ROM:    Left : Pt demonstrates ability to perform full left hook, open and full fists. AROM's : wrist flexion = 40 deg, extension = 45 deg, pronation/supination 75% of full AROM, Ulnar deviation = 18 deg, radial deviation = 20 deg. Right: Passive Everlina Aver digit ROM's: 2: passive RODRIGEZ now 75%. (12/22/2021), MCP flexion at index = 90 deg (1/22/22) , Thumb IP = full extension, 50% flexion, MCP = full extension, 50% flexion. CMC: 25% flexion. CMC radial abduction 40 deg. Wrist flexion P/AROM = 55 deg (1/5/2022), extension A/PROM = 40 deg/48 deg  Deg (1/5/22), Ulnar deviation = 15 deg PROM, radial deviation = to 15 deg. R forearm active/passive. pronation/supination  = ~ 60 deg to 75 deg (12/29/2021). [id number]-ga-[id number] palmar crease measures: R hand: index and ring 4.5 cm (1/31/22)  Strength:   Left:  digits all 4-/5, Wrist flex/ext all 4/5  Right: digits all 3-/5, wrist flex/ext 4-/5   (1/19/22): 3rd run dynamometer: left = 32 #, right = 15#. 1/28/22: right = 15#    2/7/22: Fingernail to palm finger flexion measures R hand: Index = 3.5cm, Middle: 3.5 cm, Ring: 3.4 cm, 5th finger: 3.4 cm. Able to flex each MCP at digits 2-5 to 90 deg, and at each PIP to 90 deg, each w/stiff end feel. Able to lateral pinch with index to middle phalanx and tip, able to pinch to tip of middle digit with effort.  strength at 3rd rung = 17#. Quick-DASH score: 29/55  2/9/22: Pre treatment:   R hand ROM's: MCP flexion# 2 = 40 deg, #3 = 40 deg, # 4 = 40 deg, #5 = 30 deg. PIP flexion: #2 = 40 deg, # 3 = 50 deg, # 4 = 52 deg, # 5 = 50 deg,   DIP fleixon: #2 = 40 deg, # 3 = 30 deg, # 4 = 30 deg, # 5 = 28 deg. Fingernail to proximal palmar crease distances: #2 = 6cm, # 3 = 6 cm, # 4 = 5.5 cm, # 5 = 4.3 cm.    Post treatment:   MCP flexion : #2 = 60 deg, # 3 = 55 deg  PIP fleixon: #2 = 88 deg, # 3 = 70 deg. 2/16/22:  Pre treatment:   R hand ROM's: MCP flexion# 2 = 54 deg, #3 = 44 deg, # 4 = 32 deg, #5 = 26 deg. PIP flexion: #2 = 40 deg, # 3 = 50 deg, # 4 = 55 deg, # 5 = 50 deg,   DIP fleixon: #2 = 32 deg, # 3 = 32 deg, # 4 = 42 deg, # 5 = 44 deg. Post treatment fingertipe to prox. Palmar crease distance: index = 4.6cm, middle = 4.6 cm. : #2: left = 16#, right = 26#, #4: left = 18#, right = 32#.   2/25/22: Wrist PROM's : flexion = 52 deg, extn = 38 deg, radial dev. = 12 deg, ulnar dev = 25 deg. Quick DASH = 32/55. 3/7/22: PIP flexion PROM: 2nd = 80 deg, 3rd = 82 deg, 4th = 82 deg, 5th = 85 deg.   3/11/22: Fingertip to prox. Palmar crease: Index = 3.5cm, Middle = 3.2cm    3/18/22: MCP flexion: #2: 80 deg P, #3: 75 deg P, # 4: 80 deg P  PIP flexion: #2: 65 deg P, #3: 788 deg P, #4: 80 deg P  Full fist fingertip to palm distance: #2: 4.7 cm A, 4.0cm P, #3 5.0cm A, 3.0 cm P, # 5: 4.9 cm A, 3.3 cm P. Wrist AROM/PROM R: flexion : 40 deg A, 43 deg. P, Extension: 40 deg A, 45 deg P. Quick Dash: 25/55  3/25/22: R : # 2 = 15#, # 4 = 19#.       4/13/22: MCP flexion: #2: 70 deg A, 85 deg P, #3: 60 deg A, 80 deg P, # 4: 77 deg P  PIP flexion: #2: 65 deg A, 75 deg P, #3: 90 deg P, #4: 95 deg P    Wrist AROM R: flexion : 60 deg A, , Extension: 50 deg A, Radial deviation 20 deg A, Ulnar deviation = 30 deg A  5/4/22: fingertip to palm during full finger flexion: #2: 4.5cm A, 3.2 cm P, # 3: 4.5 cm A, 3.2 cm P, # 4: 4.5cm A, 3 cm P, # 5: 3 cm A, 2.5cm P.      TREATMENT:   THERAPEUTIC ACTIVITY: ( see below for minutes): Therapeutic activities per grid below to improve mobility, strength, balance and coordination. Required minimal visual, verbal, manual and tactile cues to improve independence and safety with daily activities .   THERAPEUTIC EXERCISE: (see below for minutes):  Exercises per grid below to improve mobility, strength, balance and coordination. Required minimal verbal and manual cues to promote proper body alignment, promote proper body posture and promote proper body mechanics. Progressed resistance, range, repetitions and complexity of movement as indicated. MANUAL THERAPY: (see below for minutes): Joint mobilization and Soft tissue mobilization was utilized and necessary because of the patient's restricted joint motion, painful spasm, loss of articular motion and restricted motion of soft tissue. MODALITIES: (see below for minutes):      to decrease pain    SELF CARE: (see below for minutes): Procedure(s) (per grid) utilized to improve and/or restore self-care/home management as related to dressing, bathing and grooming. Required minimal verbal cueing to facilitate activities of daily living skills and compensatory activities. Date: 5/16/22 (visit 64)          Modalities:                                        Therapeutic Exercise: 43 min          Static hold stretch: digits 2-5 PIP/DIP end range flexion x 5 min          Digits 2-5 MCP flexion during active PIP/DIP flexion: 3 x 15          Putty composite flexion individually : 15 each at digits 2-5          3# R wrist curls x 30          3# R  wrist extensions x 30          2# hammer R (short ): 30 each pronation/supination, wrist circles. Biceps curls w/ 6# full range x 40          Rows x 40 bilat  (green tubing)          Weightbearing wrist extn. Stretch bilat: x 1 min          Individual finger to thumb pinches w/ red putty: 5x each digits 2-5 R.                               Proprioceptive Activities:                                        Manual Therapy:                                                  Therapeutic Activities:                                          HEP: Exercises    4/6/22:   Finger MP Flexion AROM - 1 x daily - 7 x weekly - 3 sets - 15 reps  Half Fist AROM - 1 x daily - 7 x weekly - 3 sets - 15 reps  Thumb Abduction AROM on Table - 1 x daily - 7 x weekly - 3 sets - 15 reps  Seated Finger Composite Flexion Stretch - 1 x daily - 7 x weekly - 3 sets - 30 second hold  Hand PROM Finger Extension - 1 x daily - 7 x weekly - 3 sets - 30 seconds hold  Wrist Flexor Stretch in Pronation - 1 x daily - 7 x weekly - 3 sets - 30 seconds hold  Seated Thumb MCP PROM - 1 x daily - 7 x weekly - 3 sets - 30 seocnds hold  Gripping Sponge Supinated - 1 x daily - 7 x weekly - 3 sets - 15 reps      [employer] ([employer]) Portal  Treatment/Session Summary:    · Response to Treatment: Nearing plateau in Finger ROM. Shifting more in remaining visits to finger strength/coordination and RUE strength for ADL's. · Communication/Consultation:  None today  · Equipment provided today:  None today   · Recommendations/Intent for next treatment session: Emphasis on a/p/rrom to regain flexor tendon/finger flexion RODRIGEZ and thumb opposition for normal /grasp, pronation/supination. Manual therapy for joint and tendon mobility.      Total Treatment Billable Duration:  43 minutes  PT Patient Time In/Time Out  Time In: 1100  Time Out: [facility], PT    Future Appointments   Date Time Provider Kamille Reis   5/18/2022 11:00 AM Johana Murray PT [facility]   5/23/2022 11:00 AM Johana Murray PT [facility]   5/25/2022 11:00 AM Conchita Barragan, PT [facility]

## 2022-05-18 ENCOUNTER — HOSPITAL ENCOUNTER (OUTPATIENT)
Dept: PHYSICAL THERAPY | Age: 82
Discharge: HOME | End: 2022-05-18
Payer: COMMERCIAL

## 2022-05-18 PROCEDURE — 97110 THERAPEUTIC EXERCISES: CPT

## 2022-05-18 NOTE — PROGRESS NOTES
Angelic Black  : 1940  Primary: [facility]  Secondary:  [address] @ [place] [address].  [phone]   [phone]      OUTPATIENT PHYSICAL THERAPY: Daily Treatment Note  2022   ICD-10: Treatment Diagnosis: Pain in right wrist (M25.531), Stiffness of right wrist, not elsewhere classified (M25.631), Pain in left wrist (M25.532), Stiffness of left wrist, not elsewhere classified (M25.632), Stiffness of right hand, not elsewhere classified (M25.641) and Stiffness of left hand, not elsewhere classified (M25.642)  MEDICAL/REFERRING DIAGNOSIS:  Status post bilateral distal radius fractures   TREATMENT PLAN:  Effective Dates: 2022 to 2022 (30 days). Frequency/Duration: 1-2 times a week for 30 Days  GOALS: (Goals have been discussed and agreed upon with patient.)  Short-Term Functional Goals: Time Frame: 4 weeks:   1. Independent performance of home exercise program (MET)  2. Reduce R hand edema to normal levels to allow normal finger to palm fist motions (MET)  3. Increase R wrist/hand ROM's to 75% or better of normal  In all planes of motion (MET)  Discharge Goals: Time Frame: 16 weeks  1. Pt to demonstrate at least 35#   Strength L/R for normal weight carrying (progressing)  2. Pt to demonstrate L/R wrist/ hand ROM's 85% or better of normal all planes (progressing)  3. Improve Quick-DASH scores to 15/55 or better to reflect return to normal ADL function (progressing)  Rehabilitation Potential For Stated Goals: Good  _______________________________________________________________________  Pre-treatment Symptoms/Complaints: Hand is temporarily less stiff after therapy sessions. She's thinking about obtaining a paraffin unit for home to help heat her hand before exercising.    Pain: Initial: 3/10 Post Session:  No increase/10   Medications Last Reviewed:  2022  Updated Objective Findings: Observation/Orthostatic Postural Assessment:    3/9/22: minimal R digit swelling. Palpation:    Diffuse right digital swelling, mild dorsal R hand swelling (12/29/2021)  ROM:    Left : Pt demonstrates ability to perform full left hook, open and full fists. AROM's : wrist flexion = 40 deg, extension = 45 deg, pronation/supination 75% of full AROM, Ulnar deviation = 18 deg, radial deviation = 20 deg. Right: Passive Kwaku Human digit ROM's: 2: passive RODRIGEZ now 75%. (12/22/2021), MCP flexion at index = 90 deg (1/22/22) , Thumb IP = full extension, 50% flexion, MCP = full extension, 50% flexion. CMC: 25% flexion. CMC radial abduction 40 deg. Wrist flexion P/AROM = 55 deg (1/5/2022), extension A/PROM = 40 deg/48 deg  Deg (1/5/22), Ulnar deviation = 15 deg PROM, radial deviation = to 15 deg. R forearm active/passive. pronation/supination  = ~ 60 deg to 75 deg (12/29/2021). [id number] palmar crease measures: R hand: index and ring 4.5 cm (1/31/22)  Strength:   Left:  digits all 4-/5, Wrist flex/ext all 4/5  Right: digits all 3-/5, wrist flex/ext 4-/5   (1/19/22): 3rd run dynamometer: left = 32 #, right = 15#. 1/28/22: right = 15#    2/7/22: Fingernail to palm finger flexion measures R hand: Index = 3.5cm, Middle: 3.5 cm, Ring: 3.4 cm, 5th finger: 3.4 cm. Able to flex each MCP at digits 2-5 to 90 deg, and at each PIP to 90 deg, each w/stiff end feel. Able to lateral pinch with index to middle phalanx and tip, able to pinch to tip of middle digit with effort.  strength at 3rd rung = 17#. Quick-DASH score: 29/55  2/9/22: Pre treatment:   R hand ROM's: MCP flexion# 2 = 40 deg, #3 = 40 deg, # 4 = 40 deg, #5 = 30 deg. PIP flexion: #2 = 40 deg, # 3 = 50 deg, # 4 = 52 deg, # 5 = 50 deg,   DIP fleixon: #2 = 40 deg, # 3 = 30 deg, # 4 = 30 deg, # 5 = 28 deg. Fingernail to proximal palmar crease distances: #2 = 6cm, # 3 = 6 cm, # 4 = 5.5 cm, # 5 = 4.3 cm.    Post treatment:   MCP flexion : #2 = 60 deg, # 3 = 55 deg  PIP fleixon: #2 = 88 deg, # 3 = 70 deg. 2/16/22:  Pre treatment:   R hand ROM's: MCP flexion# 2 = 54 deg, #3 = 44 deg, # 4 = 32 deg, #5 = 26 deg. PIP flexion: #2 = 40 deg, # 3 = 50 deg, # 4 = 55 deg, # 5 = 50 deg,   DIP fleixon: #2 = 32 deg, # 3 = 32 deg, # 4 = 42 deg, # 5 = 44 deg. Post treatment fingertipe to prox. Palmar crease distance: index = 4.6cm, middle = 4.6 cm. : #2: left = 16#, right = 26#, #4: left = 18#, right = 32#.   2/25/22: Wrist PROM's : flexion = 52 deg, extn = 38 deg, radial dev. = 12 deg, ulnar dev = 25 deg. Quick DASH = 32/55. 3/7/22: PIP flexion PROM: 2nd = 80 deg, 3rd = 82 deg, 4th = 82 deg, 5th = 85 deg.   3/11/22: Fingertip to prox. Palmar crease: Index = 3.5cm, Middle = 3.2cm    3/18/22: MCP flexion: #2: 80 deg P, #3: 75 deg P, # 4: 80 deg P  PIP flexion: #2: 65 deg P, #3: 788 deg P, #4: 80 deg P  Full fist fingertip to palm distance: #2: 4.7 cm A, 4.0cm P, #3 5.0cm A, 3.0 cm P, # 5: 4.9 cm A, 3.3 cm P. Wrist AROM/PROM R: flexion : 40 deg A, 43 deg. P, Extension: 40 deg A, 45 deg P. Quick Dash: 25/55  3/25/22: R : # 2 = 15#, # 4 = 19#.       4/13/22: MCP flexion: #2: 70 deg A, 85 deg P, #3: 60 deg A, 80 deg P, # 4: 77 deg P  PIP flexion: #2: 65 deg A, 75 deg P, #3: 90 deg P, #4: 95 deg P    Wrist AROM R: flexion : 60 deg A, , Extension: 50 deg A, Radial deviation 20 deg A, Ulnar deviation = 30 deg A  5/4/22: fingertip to palm during full finger flexion: #2: 4.5cm A, 3.2 cm P, # 3: 4.5 cm A, 3.2 cm P, # 4: 4.5cm A, 3 cm P, # 5: 3 cm A, 2.5cm P.      TREATMENT:   THERAPEUTIC ACTIVITY: ( see below for minutes): Therapeutic activities per grid below to improve mobility, strength, balance and coordination. Required minimal visual, verbal, manual and tactile cues to improve independence and safety with daily activities . THERAPEUTIC EXERCISE: (see below for minutes):  Exercises per grid below to improve mobility, strength, balance and coordination.   Required minimal verbal and manual cues to promote proper body alignment, promote proper body posture and promote proper body mechanics. Progressed resistance, range, repetitions and complexity of movement as indicated. MANUAL THERAPY: (see below for minutes): Joint mobilization and Soft tissue mobilization was utilized and necessary because of the patient's restricted joint motion, painful spasm, loss of articular motion and restricted motion of soft tissue. MODALITIES: (see below for minutes):      to decrease pain    SELF CARE: (see below for minutes): Procedure(s) (per grid) utilized to improve and/or restore self-care/home management as related to dressing, bathing and grooming. Required minimal verbal cueing to facilitate activities of daily living skills and compensatory activities. Date: 5/16/22 (visit 64)  5/18/22 ( visit 65)        Modalities:                                        Therapeutic Exercise: 43 min 45 min         Static hold stretch: digits 2-5 PIP/DIP end range flexion x 5 min Static hold stretching: PIP/DIP's 2-5 at end range flexion x 3 min         Digits 2-5 MCP flexion during active PIP/DIP flexion: 3 x 15 DIP 2-5 static stretch holds w/ 10 rep sets of PIP/DIP flexion          Putty composite flexion individually : 15 each at digits 2-5 Resisted finger extension: 3 x 12 digits 2-5         3# R wrist curls x 30 R thumb IP/MCP/CMC flexion/opposition stretch x 1 min         3# R  wrist extensions x 30 Finger adduction w/ putty resist: 2-3,3-4,4-5: 10 reps each         2# hammer R (short ): 30 each pronation/supination, wrist circles. closthepin squeezes thumb to index, middle, ring fingers  X 20 each         Biceps curls w/ 6# full range x 40 Putty pip/dip combined flexion x 15 x 5\"         Rows x 40 bilat  (green tubing) Putty thumb/index pinches x 20         Weightbearing wrist extn.  Stretch bilat: x 1 min Green therabar: bilat pronation bends, supination bends : 30 each Individual finger to thumb pinches w/ red putty: 5x each digits 2-5 R. Green therabar; alternating wrist flex/ext isometrics x 20          Biceps curls partial range x 50 w/ 7#          R hand 10# carry 200'        Proprioceptive Activities:                                        Manual Therapy:                                                  Therapeutic Activities:                                          HEP: Exercises    4/6/22:   Finger MP Flexion AROM - 1 x daily - 7 x weekly - 3 sets - 15 reps  Half Fist AROM - 1 x daily - 7 x weekly - 3 sets - 15 reps  Thumb Abduction AROM on Table - 1 x daily - 7 x weekly - 3 sets - 15 reps  Seated Finger Composite Flexion Stretch - 1 x daily - 7 x weekly - 3 sets - 30 second hold  Hand PROM Finger Extension - 1 x daily - 7 x weekly - 3 sets - 30 seconds hold  Wrist Flexor Stretch in Pronation - 1 x daily - 7 x weekly - 3 sets - 30 seconds hold  Seated Thumb MCP PROM - 1 x daily - 7 x weekly - 3 sets - 30 seocnds hold  Gripping Sponge Supinated - 1 x daily - 7 x weekly - 3 sets - 15 reps      [employer] Portal  Treatment/Session Summary:    · Response to Treatment: Better functional carry capacity , able to carry 10#.  ROM plateauing, improves temporarily after stretching but back to baseline stiffness after therapy sessions. · Communication/Consultation:  None today  · Equipment provided today:  None today   · Recommendations/Intent for next treatment session: Emphasis on a/p/rrom to regain flexor tendon/finger flexion RODRIGEZ and thumb opposition for normal /grasp, pronation/supination. Manual therapy for joint and tendon mobility. Discuss discharge planning at next visit.      Total Treatment Billable Duration:  45 minutes  PT Patient Time In/Time Out  Time In: 0385  Time Out: Suraj Borden PT    Future Appointments   Date Time Provider Kamille Reis   5/23/2022 11:00 AM Severiano Mancia, [facility]   5/25/2022 11:00 AM Emily Barragan, PT [facility]

## 2022-05-23 ENCOUNTER — HOSPITAL ENCOUNTER (OUTPATIENT)
Dept: PHYSICAL THERAPY | Age: 82
Setting detail: RECURRING SERIES
Discharge: HOME | End: 2022-05-26
Payer: COMMERCIAL

## 2022-05-23 ENCOUNTER — APPOINTMENT (OUTPATIENT)
Dept: PHYSICAL THERAPY | Age: 82
End: 2022-05-23
Payer: COMMERCIAL

## 2022-05-23 PROCEDURE — 97110 THERAPEUTIC EXERCISES: CPT

## 2022-05-23 NOTE — PROGRESS NOTES
Lisa Galarza  : 1940  Primary: [facility] [address]  Secondary:  [address] @ [id number] [place] [id number]  Phone: [phone]  Fax: [phone] Effective Dates: 2022 to 2022 (30 days). Frequency/Duration: 1-2 times a week for 30 Days      PT Visit Info:    No data recorded    OUTPATIENT PHYSICAL THERAPY:OP NOTE TYPE: Treatment Note 2022     Appt Desk   Episode      Treatment Diagnosis:  ICD-10: Treatment Diagnosis: Pain in right wrist (M25.531), Stiffness of right wrist, not elsewhere classified (M25.631), Pain in left wrist (M25.532), Stiffness of left wrist, not elsewhere classified (Q01.813), Stiffness of right hand, not elsewhere classified (M25.641) and Stiffness of left hand, not elsewhere classified (L17.958)     Medical/Referring Diagnosis:  Unspecified fracture of unspecified forearm, initial encounter for closed fracture [S52.90XA]  Referring Physician:  Sherri Walton MD MD Orders:  PT Eval and Treat   Date of Onset:  No data recorded   Allergies:  Patient has no known allergies. Restrictions/Precautions:    No data recordedNo data recorded   Interventions Planned (Treatment may consist of any combination of the following):    1. Balance Exercise  2. Family Education  3. Gait Training  4. Home Exercise Program (HEP)  5. Manual Therapy  6. Range of Motion (ROM)  7. Therapeutic Activites  8. Therapeutic Exercise/Strengthening   Subjective Comments:  Pt notes that her hand is about the same - stiffens up between treatmetns, loosens with exercise. Initial: 0/10 Post Session: 0/10  Medications Last Reviewed:  2022  Updated Objective Findings:  22: Pre treatment:   R hand ROM's: MCP flexion# 2 = 40 deg, #3 = 40 deg, # 4 = 40 deg, #5 = 30 deg. PIP flexion: #2 = 40 deg, # 3 = 50 deg, # 4 = 52 deg, # 5 = 50 deg,   DIP fleixon: #2 = 40 deg, # 3 = 30 deg, # 4 = 30 deg, # 5 = 28 deg.    Fingernail to proximal palmar crease distances: #2 = 6cm, # 3 = 6 cm, # 4 = 5.5 cm, # 5 = 4.3 cm. Post treatment:   MCP flexion : #2 = 60 deg, # 3 = 55 deg  PIP fleixon: #2 = 88 deg, # 3 = 70 deg. 2/16/22: Pre treatment:   R hand ROM's: MCP flexion# 2 = 54 deg, #3 = 44 deg, # 4 = 32 deg, #5 = 26 deg. PIP flexion: #2 = 40 deg, # 3 = 50 deg, # 4 = 55 deg, # 5 = 50 deg,   DIP fleixon: #2 = 32 deg, # 3 = 32 deg, # 4 = 42 deg, # 5 = 44 deg. Post treatment fingertipe to prox. Palmar crease distance: index = 4.6cm, middle = 4.6 cm. : #2: left = 16#, right = 26#, #4: left = 18#, right = 32#.   2/25/22: Wrist PROM's : flexion = 52 deg, extn = 38 deg, radial dev. = 12 deg, ulnar dev = 25 deg. Quick DASH = 32/55. 3/7/22: PIP flexion PROM: 2nd = 80 deg, 3rd = 82 deg, 4th = 82 deg, 5th = 85 deg.   3/11/22: Fingertip to prox. Palmar crease: Index = 3.5cm, Middle = 3.2cm    3/18/22: MCP flexion: #2: 80 deg P, #3: 75 deg P, # 4: 80 deg P  PIP flexion: #2: 65 deg P, #3: 788 deg P, #4: 80 deg P  Full fist fingertip to palm distance: #2: 4.7 cm A, 4.0cm P, #3 5.0cm A, 3.0 cm P, # 5: 4.9 cm A, 3.3 cm P. Wrist AROM/PROM R: flexion : 40 deg A, 43 deg. P, Extension: 40 deg A, 45 deg P. Quick Dash: 25/55  3/25/22: R : # 2 = 15#, # 4 = 19#.     4/13/22: MCP flexion: #2: 70 deg A, 85 deg P, #3: 60 deg A, 80 deg P, # 4: 77 deg P  PIP flexion: #2: 65 deg A, 75 deg P, #3: 90 deg P, #4: 95 deg P    Wrist AROM R: flexion : 60 deg A, , Extension: 50 deg A, Radial deviation 20 deg A, Ulnar deviation = 30 deg A  5/4/22: fingertip to palm during full finger flexion: #2: 4.5cm A, 3.2 cm P, # 3: 4.5 cm A, 3.2 cm P, # 4: 4.5cm A, 3 cm P, # 5: 3 cm A, 2.5cm P. Treatment   TREATMENT:   THERAPEUTIC ACTIVITY: ( see below for minutes): Therapeutic activities per grid below to improve mobility, strength, balance and coordination. Required minimal visual, verbal, manual and tactile cues to improve independence and safety with daily activities .   THERAPEUTIC EXERCISE: (see below for minutes): Exercises per grid below to improve mobility, strength, balance and coordination. Required minimal verbal and manual cues to promote proper body alignment, promote proper body posture and promote proper body mechanics. Progressed resistance, range, repetitions and complexity of movement as indicated. MANUAL THERAPY: (see below for minutes): Joint mobilization and Soft tissue mobilization was utilized and necessary because of the patient's restricted joint motion, painful spasm, loss of articular motion and restricted motion of soft tissue. MODALITIES: (see below for minutes): to decrease pain   SELF CARE: (see below for minutes): Procedure(s) (per grid) utilized to improve and/or restore self-care/home management as related to dressing, bathing and grooming. Required minimal verbal cueing to facilitate activities of daily living skills and compensatory activities  Date: 05/23/22 (visit 64)         Modalities:                                Therapeutic Exercise: 39 min        Static stretch at PIP's - 3 min at # 2-3, 3 min @ 4-5, f/b DIP active flexion's x 15 each        MCP static stretch 2-3-4-5 x 2 min        Resisted hook fists: 15 manl resist, 15 w/ red theraputty        Passive stretch R thumb CMC/pip/DIP: abduction, flexion/opposition: 3 min total        Therabar green: 50 each bilat pronation bends, supination bends, 30 each alternating L/R wrist flexion/extension        Brachioradialis curls x 30 w/6#        Biceps curls R x 20 w/6#                                       Proprioceptive Activities:                                Manual Therapy:                        Functional Activities:                                            Treatment/Session Summary:    · Treatment Assessment:   Pt is reaching a plateau in progress - we discussed this today, mutually agreed to discharge therapy after her next visit later this week.   · Communication/Consultation:  None today  · Equipment provided today: None  · Recommendations/Intent for next treatment session: Next visit will focus on Progressive ROM/strength both hands/wrists and elbows. Final visit is next visit, will issue final HEP and reassess.     Total Treatment Billable Duration:  39 minutes  Time In: 1107  Time Out: [address], PT       Post Session Pain  Charge Capture  [employer] Portal  MD Guidelines  Scanned Media  Benefits  MyChart

## 2022-05-25 ENCOUNTER — HOSPITAL ENCOUNTER (OUTPATIENT)
Dept: PHYSICAL THERAPY | Age: 82
Setting detail: RECURRING SERIES
Discharge: HOME | End: 2022-05-28
Payer: COMMERCIAL

## 2022-05-25 ENCOUNTER — APPOINTMENT (OUTPATIENT)
Dept: PHYSICAL THERAPY | Age: 82
End: 2022-05-25
Payer: COMMERCIAL

## 2022-05-25 PROCEDURE — 97110 THERAPEUTIC EXERCISES: CPT

## 2022-05-25 NOTE — PROGRESS NOTES
Lisa Galarza  : 1940  Primary: [facility] [address]  [facility]  [address] [facility] [address] @ [address] [place] [address]  Phone: [phone]  Fax: [phone] Effective Dates: 2022 to 2022 (30 days). Frequency/Duration: 1-2 times a week for 30 Days      PT Visit Info:    No data recorded    OUTPATIENT PHYSICAL THERAPY:OP NOTE TYPE: Treatment Note 2022     Appt Desk   Episode      Treatment Diagnosis:  ICD-10: Treatment Diagnosis: Pain in right wrist (M25.531), Stiffness of right wrist, not elsewhere classified (M25.631), Pain in left wrist (M25.532), Stiffness of left wrist, not elsewhere classified (M22.278), Stiffness of right hand, not elsewhere classified (M25.641) and Stiffness of left hand, not elsewhere classified (Z53.383)     Medical/Referring Diagnosis:  Unspecified fracture of unspecified forearm, initial encounter for closed fracture [S52.90XA]  Referring Physician:  Sherri Walton MD MD Orders:  PT Eval and Treat   Date of Onset:  No data recorded   Allergies:  Patient has no known allergies. Restrictions/Precautions:    Restrictions/Precautions: None  Required Braces or Orthoses?: No  No data recorded   Interventions Planned (Treatment may consist of any combination of the following):    1. Balance Exercise  2. Family Education  3. Gait Training  4. Home Exercise Program (HEP)  5. Manual Therapy  6. Range of Motion (ROM)  7. Therapeutic Activites  8. Therapeutic Exercise/Strengthening   Subjective Comments:  Finger feel a bit swollen today. Initial: 0/10 Post Session: 0/10  Medications Last Reviewed:  2022  Updated Objective Findings:  22: Pre treatment:   R hand ROM's: MCP flexion# 2 = 40 deg, #3 = 40 deg, # 4 = 40 deg, #5 = 30 deg. PIP flexion: #2 = 40 deg, # 3 = 50 deg, # 4 = 52 deg, # 5 = 50 deg,   DIP fleixon: #2 = 40 deg, # 3 = 30 deg, # 4 = 30 deg, # 5 = 28 deg.    Fingernail to proximal palmar crease distances: #2 = 6cm, # 3 = 6 cm, # 4 = 5.5 cm, # 5 = 4.3 cm. Post treatment:   MCP flexion : #2 = 60 deg, # 3 = 55 deg  PIP fleixon: #2 = 88 deg, # 3 = 70 deg. 2/16/22: Pre treatment:   R hand ROM's: MCP flexion# 2 = 54 deg, #3 = 44 deg, # 4 = 32 deg, #5 = 26 deg. PIP flexion: #2 = 40 deg, # 3 = 50 deg, # 4 = 55 deg, # 5 = 50 deg,   DIP fleixon: #2 = 32 deg, # 3 = 32 deg, # 4 = 42 deg, # 5 = 44 deg. Post treatment fingertipe to prox. Palmar crease distance: index = 4.6cm, middle = 4.6 cm. : #2: left = 16#, right = 26#, #4: left = 18#, right = 32#.   2/25/22: Wrist PROM's : flexion = 52 deg, extn = 38 deg, radial dev. = 12 deg, ulnar dev = 25 deg. Quick DASH = 32/55. 3/7/22: PIP flexion PROM: 2nd = 80 deg, 3rd = 82 deg, 4th = 82 deg, 5th = 85 deg.   3/11/22: Fingertip to prox. Palmar crease: Index = 3.5cm, Middle = 3.2cm    3/18/22: MCP flexion: #2: 80 deg P, #3: 75 deg P, # 4: 80 deg P  PIP flexion: #2: 65 deg P, #3: 788 deg P, #4: 80 deg P  Full fist fingertip to palm distance: #2: 4.7 cm A, 4.0cm P, #3 5.0cm A, 3.0 cm P, # 5: 4.9 cm A, 3.3 cm P. Wrist AROM/PROM R: flexion : 40 deg A, 43 deg. P, Extension: 40 deg A, 45 deg P. Quick Dash: 25/55  3/25/22: R : # 2 = 15#, # 4 = 19#.     4/13/22: MCP flexion: #2: 70 deg A, 85 deg P, #3: 60 deg A, 80 deg P, # 4: 77 deg P  PIP flexion: #2: 65 deg A, 75 deg P, #3: 90 deg P, #4: 95 deg P    Wrist AROM R: flexion : 60 deg A, , Extension: 50 deg A, Radial deviation 20 deg A, Ulnar deviation = 30 deg A  5/4/22: fingertip to palm during full finger flexion: #2: 4.5cm A, 3.2 cm P, # 3: 4.5 cm A, 3.2 cm P, # 4: 4.5cm A, 3 cm P, # 5: 3 cm A, 2.5cm P.   5/25/22: MCP flexion: #2 = 85 deg, # 3= 80 deg, # 4 = 65 deg, 3% = 55 deg. PIP flexion: #2 = 75 ddeg, # 3 = 77 deg, # 4 = 80 deg, # 5 = 65 deg  DIP flexion : #2 = 60 deg, # 3= 60 deg, #4 = 65 deg, # 5 = 45 deg.  strength: Dynamometer run 2: left = 36#, right = 21#. Dynamometer rung 4: left = 23#, right = 28#.      Treatment TREATMENT:   THERAPEUTIC ACTIVITY: ( see below for minutes): Therapeutic activities per grid below to improve mobility, strength, balance and coordination. Required minimal visual, verbal, manual and tactile cues to improve independence and safety with daily activities . THERAPEUTIC EXERCISE: (see below for minutes): Exercises per grid below to improve mobility, strength, balance and coordination. Required minimal verbal and manual cues to promote proper body alignment, promote proper body posture and promote proper body mechanics. Progressed resistance, range, repetitions and complexity of movement as indicated. MANUAL THERAPY: (see below for minutes): Joint mobilization and Soft tissue mobilization was utilized and necessary because of the patient's restricted joint motion, painful spasm, loss of articular motion and restricted motion of soft tissue. MODALITIES: (see below for minutes): to decrease pain   SELF CARE: (see below for minutes): Procedure(s) (per grid) utilized to improve and/or restore self-care/home management as related to dressing, bathing and grooming.  Required minimal verbal cueing to facilitate activities of daily living skills and compensatory activities  Date: 5/23/22 (visit 64)   5/25/22 (visit 65)      Modalities:                                Therapeutic Exercise: 39 min 40 min       Static stretch at PIP's - 3 min at # 2-3, 3 min @ 4-5, f/b DIP active flexion's x 15 each Static stretching: MCP's 2-3 and 4-5  3 min each, PIP's 2-3-4-5 x 3 min       MCP static stretch 2-3-4-5 x 2 min Individual MCP,PIP,DIP stretching: 10 min       Resisted hook fists: 15 manl resist, 15 w/ red theraputty Composite full fist flexion stretch x 1 min       Passive stretch R thumb CMC/pip/DIP: abduction, flexion/opposition: 3 min total Manl resist hook fist x 2 x 20       Therabar green: 50 each bilat pronation bends, supination bends, 30 each alternating L/R wrist flexion/extension Self stretch R wrist extensionx 2 min       Brachioradialis curls x 30 w/6# Therabar green: 50 each bilat pronation bends, supination bends       Biceps curls R x 20 w/6# 2# hammer with close  on handle, radial deviations x 30, pron/sup x 30                                       Proprioceptive Activities:                                Manual Therapy:                        Functional Activities:                                            Treatment/Session Summary:    · Treatment Assessment:   Murphy Masterson in progress reached. Appropriate for discharge after today's visit. Home exercise options reviewed, with pt encouraged to continue daily funcitonal use of hand for gardening and ADL's as best option to maintain hand mobility and strength.    · Communication/Consultation:  None today  · Equipment provided today:  None  · Recommendations/Intent for next treatment session: Discharge to home program.    Total Treatment Billable Duration:  40 minutes  Time In: 1100  Time Out: [address] [place] [address]  MD [address]

## 2022-05-25 NOTE — THERAPY DISCHARGE
Mary Galarza  : 1940  Primary: [address]  Secondary:  [address] @ [id number] [place] [id number]  Phone: [phone]  Fax: [phone] Plan Frequency: 2 times per week    Plan of Care/Certification Expiration Date: 22    Effective Dates: 2022 to 2022 (30 days). Frequency/Duration: 1-2 times a week for 30 Days      PT Visit Info:    No data recorded    OUTPATIENT PHYSICAL THERAPY:OP NOTE TYPE: Discharge Summary 2022               Episode  Appt Desk         Treatment Diagnosis:  ICD-10: Treatment Diagnosis: Pain in right wrist (M25.531), Stiffness of right wrist, not elsewhere classified (M25.631), Pain in left wrist (M25.532), Stiffness of left wrist, not elsewhere classified (M25.632), Stiffness of right hand, not elsewhere classified (M25.641) and Stiffness of left hand, not elsewhere classified (M25.642)   * No diagnoses found *  Medical/Referring Diagnosis:  Unspecified fracture of unspecified forearm, initial encounter for closed fracture [S52.90XA]  Referring Physician:  Alvarado Herrmann MD MD Orders:  PT Eval and Treat   Return MD Appt:  TBD  Date of Onset:  No data recorded   Allergies:  Patient has no known allergies. Restrictions/Precautions:    Restrictions/Precautions: None  Required Braces or Orthoses?: No  No data recorded   Medications Last Reviewed:  2022     SUBJECTIVE   History of Injury/Illness (Reason for Referral):  See initial evaluation note from 2021. Patient Stated Goal(s):  \"maximize hand function, regain strength in arms. \"  Initial:    0  /10 Post Session:    0  /10  Past Medical History/Comorbidities:   Ms. Liliya Campbell  has no past medical history on file. Ms. Liliya Campbell  has no past surgical history on file.   Social History/Living Environment:   Lives With: Alone  Type of Home: House     Prior Level of Function/Work/Activity:   Prior level of function: Independent  Occupation: Full time employment  No data recordedNo data recorded   Learning:   Does the patient/guardian have any barriers to learning?: No barriers  Will there be a co-learner?: No  What is the preferred language of the patient/guardian?: English  Is an  required?: No  How does the patient/guardian prefer to learn new concepts?: Listening; Reading; Demonstration; Pictures/Videos     Fall Risk Scale: Total Score: 25  [place]ke[place] Fall Risk: Medium (25-44)           OBJECTIVE   See notes below   2/9/22: Pre treatment:   R hand ROM's: MCP flexion# 2 = 40 deg, #3 = 40 deg, # 4 = 40 deg, #5 = 30 deg. PIP flexion: #2 = 40 deg, # 3 = 50 deg, # 4 = 52 deg, # 5 = 50 deg,   DIP fleixon: #2 = 40 deg, # 3 = 30 deg, # 4 = 30 deg, # 5 = 28 deg. Fingernail to proximal palmar crease distances: #2 = 6cm, # 3 = 6 cm, # 4 = 5.5 cm, # 5 = 4.3 cm. Post treatment:   MCP flexion : #2 = 60 deg, # 3 = 55 deg  PIP fleixon: #2 = 88 deg, # 3 = 70 deg. 2/16/22: Pre treatment:   R hand ROM's: MCP flexion# 2 = 54 deg, #3 = 44 deg, # 4 = 32 deg, #5 = 26 deg. PIP flexion: #2 = 40 deg, # 3 = 50 deg, # 4 = 55 deg, # 5 = 50 deg,   DIP fleixon: #2 = 32 deg, # 3 = 32 deg, # 4 = 42 deg, # 5 = 44 deg. Post treatment fingertipe to prox. Palmar crease distance: index = 4.6cm, middle = 4.6 cm. : #2: left = 16#, right = 26#, #4: left = 18#, right = 32#.   2/25/22: Wrist PROM's : flexion = 52 deg, extn = 38 deg, radial dev. = 12 deg, ulnar dev = 25 deg. Quick DASH = 32/55. 3/7/22: PIP flexion PROM: 2nd = 80 deg, 3rd = 82 deg, 4th = 82 deg, 5th = 85 deg.   3/11/22: Fingertip to prox. Palmar crease: Index = 3.5cm, Middle = 3.2cm    3/18/22: MCP flexion: #2: 80 deg P, #3: 75 deg P, # 4: 80 deg P  PIP flexion: #2: 65 deg P, #3: 788 deg P, #4: 80 deg P  Full fist fingertip to palm distance: #2: 4.7 cm A, 4.0cm P, #3 5.0cm A, 3.0 cm P, # 5: 4.9 cm A, 3.3 cm P. Wrist AROM/PROM R: flexion : 40 deg A, 43 deg. P, Extension: 40 deg A, 45 deg P.    Kit Candicein: 25/55  3/25/22: R : # 2 = 15#, # 4 = 19#.     4/13/22: MCP flexion: #2: 70 deg A, 85 deg P, #3: 60 deg A, 80 deg P, # 4: 77 deg P  PIP flexion: #2: 65 deg A, 75 deg P, #3: 90 deg P, #4: 95 deg P    Wrist AROM R: flexion : 60 deg A, , Extension: 50 deg A, Radial deviation 20 deg A, Ulnar deviation = 30 deg A  5/4/22: fingertip to palm during full finger flexion: #2: 4.5cm A, 3.2 cm P, # 3: 4.5 cm A, 3.2 cm P, # 4: 4.5cm A, 3 cm P, # 5: 3 cm A, 2.5cm P.   5/25/22: MCP flexion: #2 = 85 deg, # 3= 80 deg, # 4 = 65 deg, 3% = 55 deg. PIP flexion: #2 = 75 ddeg, # 3 = 77 deg, # 4 = 80 deg, # 5 = 65 deg  DIP flexion : #2 = 60 deg, # 3= 60 deg, #4 = 65 deg, # 5 = 45 deg.  strength: Dynamometer run 2: left = 36#, right = 21#. Dynamometer rung 4: left = 23#, right = 28#. R wrist extension = 42 deg, flexion = 55 deg. (PROM's)    ASSESSMENT   Initial Assessment:  Ms. Thuy Maldonado presents with diminished right > left forearm/wrist/hand ROMs, right hand /wrist edema, healed surgical incisions, s/p bilateral distal radius fractures and ORIF bilateral distal radii. Her impairments hinder her ability to use either hand for gripping/grasping/dexterity, lifting, carrying, writing. She requires skilled PT to address these impairments to restore normal function. Problem List: (Impacting functional limitations): Body Structures, Functions, Activity Limitations Requiring Skilled Therapeutic Intervention: Decreased functional mobility ; Decreased ADL status; Decreased ROM; Decreased body mechanics;  Decreased strength     Therapy Prognosis:   Therapy Prognosis: Fair     Assessment Complexity:   High Complexity  PLAN   Effective Dates: 5/9/22 TO Plan of Care/Certification Expiration Date: 06/06/22     Frequency/Duration: Plan Frequency: 2 times per week     Interventions Planned (Treatment may consist of any combination of the following):    Current Treatment Recommendations: Strengthening; ROM; Manual Therapy - Joint Manipulation; Home exercise program     Goals: (Goals have been discussed and agreed upon with patient.)  Short-Term Functional Goals: Time Frame: 4 weeks  1. Independent performance of home exercise program (MET)  2. Reduce R hand edema to normal levels to allow normal finger to palm fist motions (MET)  3. Increase R wrist/hand ROM's to 75% or better of normal In all planes of motion (MET)  Discharge Goals: Time Frame: 16 weeks  1. Pt to demonstrate at least 35#  Strength L/R for normal weight carrying (Partially met)  2. Pt to demonstrate L/R wrist/ hand ROM's 85% or better of normal all planes (Partially met)  3. Improve Quick-DASH scores to 15/55 or better to reflect return to normal ADL function (Partially met)  Rehabilitation Potential For Stated Goals: Good          Outcome Measure: Tool Used: Disabilities of the Arm, Shoulder and Hand (DASH) Questionnaire - Quick Version  Score:  Initial: 45/55  Most Recent: 17/55 (Date: 5/25/22 )   Interpretation of Score: The DASH is designed to measure the activities of daily living in person's with upper extremity dysfunction or pain. Each section is scored on a 1-5 scale, 5 representing the greatest disability. The scores of each section are added together for a total score of 55. Medical Necessity:   Patient has experienced a delay in progress due to medical complications. The following measures have been taken to improve response to treatment. extended therapy course  Reason For Services/Other Comments:  Patient has tolerated an increase in activity as demonstrated by: increased resistance/repetitions during therapeutic exercise. PT is discharged after today's visit  Total Duration:  Time In: 1100  Time Out: [facility]'s therapy, I certify that the treatment plan above will be carried out by a therapist or under their direction.   Thank you for this referral,  Gianluca Sparks PT     Referring Physician Signature: Salty Weinberg MD No Signature is Required for this note.         Post Session Pain  Charge Capture   POC Link  Treatment Note Link  MD Guidelines  MyChart

## 2022-10-03 ENCOUNTER — PATIENT MESSAGE (OUTPATIENT)
Dept: FAMILY MEDICINE CLINIC | Facility: CLINIC | Age: 82
End: 2022-10-03

## 2022-10-04 NOTE — TELEPHONE ENCOUNTER
Patient  called needing a refill on Lisinopril 20 mg and Carvedilol 12.5 mg to be sent to [facility] [place].  No up comimg appointments

## 2022-10-05 RX ORDER — CARVEDILOL 12.5 MG/1
12.5 TABLET ORAL 2 TIMES DAILY WITH MEALS
Qty: 180 TABLET | Refills: 1 | Status: SHIPPED | OUTPATIENT
Start: 2022-10-05

## 2022-10-05 RX ORDER — LISINOPRIL 20 MG/1
20 TABLET ORAL DAILY
Qty: 90 TABLET | Refills: 1 | Status: SHIPPED | OUTPATIENT
Start: 2022-10-05

## 2022-10-05 NOTE — TELEPHONE ENCOUNTER
Ame let her know I sent 6 months worth of refills. Will need her to follow up at that time.      Steve Calderon, SUREKHA - CNP

## 2023-02-16 ENCOUNTER — CLINICAL DOCUMENTATION (OUTPATIENT)
Dept: PHYSICAL THERAPY | Age: 83
End: 2023-02-16

## 2023-03-28 ENCOUNTER — TELEMEDICINE (OUTPATIENT)
Dept: FAMILY MEDICINE CLINIC | Facility: CLINIC | Age: 83
End: 2023-03-28
Payer: COMMERCIAL

## 2023-03-28 DIAGNOSIS — I10 ESSENTIAL (PRIMARY) HYPERTENSION: Primary | ICD-10-CM

## 2023-03-28 PROCEDURE — 1123F ACP DISCUSS/DSCN MKR DOCD: CPT | Performed by: FAMILY MEDICINE

## 2023-03-28 PROCEDURE — 99213 OFFICE O/P EST LOW 20 MIN: CPT | Performed by: FAMILY MEDICINE

## 2023-03-28 RX ORDER — CARVEDILOL 12.5 MG/1
12.5 TABLET ORAL 2 TIMES DAILY WITH MEALS
Qty: 180 TABLET | Refills: 1 | Status: SHIPPED | OUTPATIENT
Start: 2023-03-28

## 2023-03-28 RX ORDER — LISINOPRIL 20 MG/1
20 TABLET ORAL DAILY
Qty: 90 TABLET | Refills: 1 | Status: SHIPPED | OUTPATIENT
Start: 2023-03-28

## 2023-03-28 ASSESSMENT — ENCOUNTER SYMPTOMS
EYES NEGATIVE: 1
RESPIRATORY NEGATIVE: 1
ALLERGIC/IMMUNOLOGIC NEGATIVE: 1
GASTROINTESTINAL NEGATIVE: 1

## 2023-03-28 NOTE — PROGRESS NOTES
present. Mental Status: She is alert and oriented to person, place, and time. Psychiatric:         Mood and Affect: Mood normal.       On this date 03/28/23  I have spent 20 minutes reviewing previous notes, test results and face to face with the patient discussing the diagnosis and importance of compliance with the treatment plan as well as documenting on the day of the visit. Dunia Colin is being evaluated by a Virtual Visit (video visit) encounter to address concerns as mentioned above. A caregiver was present when appropriate. Due to this being a TeleHealth encounter (During [facility] public health emergency), evaluation of the following organ systems was limited: Vitals/Constitutional/EENT/Resp/CV/GI//MS/Neuro/Skin/Heme-Lymph-Imm. Pursuant to the emergency declaration under the [address] [place] [address], [address] [place] [address] authority and the [employer] Act, this Virtual Visit was conducted with patient's (and/or legal guardian's) consent, to reduce the patient's risk of exposure to COVID-19 and provide necessary medical care. The patient (and/or legal guardian) has also been advised to contact this office for worsening conditions or problems, and seek emergency medical treatment and/or call 911 if deemed necessary. Patient identification was verified at the start of the visit: Yes    Services were provided through a video synchronous discussion virtually to substitute for in-person clinic visit. Patient and provider were located at their individual homes. An electronic signature was used to authenticate this note.   -- Cee Richmond MD

## 2023-05-17 ENCOUNTER — OFFICE VISIT (OUTPATIENT)
Dept: FAMILY MEDICINE CLINIC | Facility: CLINIC | Age: 83
End: 2023-05-17
Payer: COMMERCIAL

## 2023-05-17 VITALS
SYSTOLIC BLOOD PRESSURE: 152 MMHG | WEIGHT: 198.6 LBS | HEART RATE: 82 BPM | HEIGHT: 62 IN | BODY MASS INDEX: 36.55 KG/M2 | OXYGEN SATURATION: 96 % | DIASTOLIC BLOOD PRESSURE: 70 MMHG | TEMPERATURE: 97.5 F

## 2023-05-17 DIAGNOSIS — I10 ESSENTIAL (PRIMARY) HYPERTENSION: Primary | ICD-10-CM

## 2023-05-17 DIAGNOSIS — F41.1 GENERALIZED ANXIETY DISORDER: ICD-10-CM

## 2023-05-17 DIAGNOSIS — Z78.0 POST-MENOPAUSAL: ICD-10-CM

## 2023-05-17 DIAGNOSIS — E66.01 SEVERE OBESITY (BMI 35.0-35.9 WITH COMORBIDITY) (HCC): ICD-10-CM

## 2023-05-17 LAB
ALBUMIN SERPL-MCNC: 3.8 G/DL (ref 3.2–4.6)
ALBUMIN/GLOB SERPL: 1.1 (ref 0.4–1.6)
ALP SERPL-CCNC: 91 U/L (ref 50–136)
ALT SERPL-CCNC: 22 U/L (ref 12–65)
ANION GAP SERPL CALC-SCNC: 3 MMOL/L (ref 2–11)
AST SERPL-CCNC: 18 U/L (ref 15–37)
BILIRUB SERPL-MCNC: 0.5 MG/DL (ref 0.2–1.1)
BUN SERPL-MCNC: 19 MG/DL (ref 8–23)
CALCIUM SERPL-MCNC: 9.5 MG/DL (ref 8.3–10.4)
CHLORIDE SERPL-SCNC: 109 MMOL/L (ref 101–110)
CHOLEST SERPL-MCNC: 248 MG/DL
CO2 SERPL-SCNC: 24 MMOL/L (ref 21–32)
CREAT SERPL-MCNC: 0.8 MG/DL (ref 0.6–1)
GLOBULIN SER CALC-MCNC: 3.5 G/DL (ref 2.8–4.5)
GLUCOSE SERPL-MCNC: 107 MG/DL (ref 65–100)
HDLC SERPL-MCNC: 55 MG/DL (ref 40–60)
HDLC SERPL: 4.5
LDLC SERPL CALC-MCNC: 164.2 MG/DL
POTASSIUM SERPL-SCNC: 4.6 MMOL/L (ref 3.5–5.1)
PROT SERPL-MCNC: 7.3 G/DL (ref 6.3–8.2)
SODIUM SERPL-SCNC: 136 MMOL/L (ref 133–143)
TRIGL SERPL-MCNC: 144 MG/DL (ref 35–150)
TSH, 3RD GENERATION: 1 UIU/ML (ref 0.36–3.74)
VLDLC SERPL CALC-MCNC: 28.8 MG/DL (ref 6–23)

## 2023-05-17 PROCEDURE — 3078F DIAST BP <80 MM HG: CPT | Performed by: FAMILY MEDICINE

## 2023-05-17 PROCEDURE — 1123F ACP DISCUSS/DSCN MKR DOCD: CPT | Performed by: FAMILY MEDICINE

## 2023-05-17 PROCEDURE — 3077F SYST BP >= 140 MM HG: CPT | Performed by: FAMILY MEDICINE

## 2023-05-17 PROCEDURE — 99214 OFFICE O/P EST MOD 30 MIN: CPT | Performed by: FAMILY MEDICINE

## 2023-05-17 RX ORDER — CARVEDILOL 12.5 MG/1
12.5 TABLET ORAL 2 TIMES DAILY WITH MEALS
Qty: 180 TABLET | Refills: 1 | Status: SHIPPED | OUTPATIENT
Start: 2023-05-17

## 2023-05-17 RX ORDER — LISINOPRIL 20 MG/1
20 TABLET ORAL DAILY
Qty: 90 TABLET | Refills: 1 | Status: SHIPPED | OUTPATIENT
Start: 2023-05-17

## 2023-05-17 ASSESSMENT — PATIENT HEALTH QUESTIONNAIRE - PHQ9
SUM OF ALL RESPONSES TO PHQ QUESTIONS 1-9: 0
SUM OF ALL RESPONSES TO PHQ9 QUESTIONS 1 & 2: 0
2. FEELING DOWN, DEPRESSED OR HOPELESS: 0
SUM OF ALL RESPONSES TO PHQ QUESTIONS 1-9: 0
SUM OF ALL RESPONSES TO PHQ QUESTIONS 1-9: 0
1. LITTLE INTEREST OR PLEASURE IN DOING THINGS: 0
SUM OF ALL RESPONSES TO PHQ QUESTIONS 1-9: 0

## 2023-05-17 NOTE — PROGRESS NOTES
Cali Mariano (: 1940) is a 78 y.o. female, established patient, here for evaluation of the following chief complaint(s):  Hypertension (Worries about bp. States she panics every time she has to take her bp. ) and Medication Problem (Hydroxyzine-  does not like it. Uses it for anxiety. Makes her sluggish the next day. )       ASSESSMENT/PLAN:  1. Essential (primary) hypertension  -     CBC with Auto Differential; Future  -     Comprehensive Metabolic Panel; Future  -     Lipid Panel; Future  -     TSH; Future  2. Post-menopausal  -     DEXA Bone Density Axial Skeleton; Future  -     CBC with Auto Differential; Future  -     Comprehensive Metabolic Panel; Future  -     Lipid Panel; Future  -     TSH; Future  3. Generalized anxiety disorder  -     CBC with Auto Differential; Future  -     Comprehensive Metabolic Panel; Future  -     Lipid Panel; Future  -     TSH; Future  4. Severe obesity (BMI 35.0-35.9 with comorbidity) (HCC)  -     CBC with Auto Differential; Future  -     Comprehensive Metabolic Panel; Future  -     Lipid Panel; Future  -     TSH; Future    Patient Instructions   Start vitamin D 2000 units daily,  Schedule for bone density scan, recent wrist, arm fracture with fall last year,  Blood pressure repeat was 152/70, patient has whitecoat hypertension, continue lisinopril and Coreg, will recheck labs today for chronic care management,  Return in 6 months for physical    SUBJECTIVE/OBJECTIVE:  HPI  Hypertension - stable, well controlled, takes medications as prescribed. denies chest pain, shortness of breath, abdominal pain, nausea, vomiting, diarrhea, dizziness or fainting, headaches or vision changes. Physical Exam  Vitals and nursing note reviewed. Constitutional:       General: She is not in acute distress. Appearance: Normal appearance. She is not ill-appearing. HENT:      Head: Normocephalic and atraumatic.       Right Ear: External ear normal.      Left Ear: External

## 2023-05-17 NOTE — PATIENT INSTRUCTIONS
Start vitamin D 2000 units daily,  Schedule for bone density scan, recent wrist, arm fracture with fall last year,  Blood pressure repeat was 152/70, patient has whitecoat hypertension, continue lisinopril and Coreg, will recheck labs today for chronic care management,  Return in 6 months for physical

## 2023-05-18 LAB
BASOPHILS # BLD: 0.1 K/UL (ref 0–0.2)
BASOPHILS NFR BLD: 1 % (ref 0–2)
DIFFERENTIAL METHOD BLD: ABNORMAL
EOSINOPHIL # BLD: 0.2 K/UL (ref 0–0.8)
EOSINOPHIL NFR BLD: 4 % (ref 0.5–7.8)
ERYTHROCYTE [DISTWIDTH] IN BLOOD BY AUTOMATED COUNT: 13.4 % (ref 11.9–14.6)
HCT VFR BLD AUTO: 45.8 % (ref 35.8–46.3)
HGB BLD-MCNC: 14.9 G/DL (ref 11.7–15.4)
IMM GRANULOCYTES # BLD AUTO: 0 K/UL (ref 0–0.5)
IMM GRANULOCYTES NFR BLD AUTO: 0 % (ref 0–5)
LYMPHOCYTES # BLD: 1.1 K/UL (ref 0.5–4.6)
LYMPHOCYTES NFR BLD: 16 % (ref 13–44)
MCH RBC QN AUTO: 31.3 PG (ref 26.1–32.9)
MCHC RBC AUTO-ENTMCNC: 32.5 G/DL (ref 31.4–35)
MCV RBC AUTO: 96.2 FL (ref 82–102)
MONOCYTES # BLD: 0.9 K/UL (ref 0.1–1.3)
MONOCYTES NFR BLD: 13 % (ref 4–12)
NEUTS SEG # BLD: 4.5 K/UL (ref 1.7–8.2)
NEUTS SEG NFR BLD: 66 % (ref 43–78)
NRBC # BLD: 0 K/UL (ref 0–0.2)
PLATELET # BLD AUTO: 235 K/UL (ref 150–450)
PMV BLD AUTO: 11.2 FL (ref 9.4–12.3)
RBC # BLD AUTO: 4.76 M/UL (ref 4.05–5.2)
WBC # BLD AUTO: 6.7 K/UL (ref 4.3–11.1)

## 2023-09-28 RX ORDER — LISINOPRIL 20 MG/1
20 TABLET ORAL DAILY
Qty: 90 TABLET | Refills: 1 | Status: SHIPPED | OUTPATIENT
Start: 2023-09-28

## 2023-09-28 RX ORDER — CARVEDILOL 12.5 MG/1
12.5 TABLET ORAL 2 TIMES DAILY WITH MEALS
Qty: 180 TABLET | Refills: 1 | Status: SHIPPED | OUTPATIENT
Start: 2023-09-28

## 2023-09-28 NOTE — TELEPHONE ENCOUNTER
Pt is requesting a refill for carvedilol ( COREG) 12.5 MG  Lisinopril ( PRINIVIL) 20 mg      Pharmacy [id number], [address] [place] [address]

## 2024-06-18 ENCOUNTER — PATIENT MESSAGE (OUTPATIENT)
Dept: FAMILY MEDICINE CLINIC | Facility: CLINIC | Age: 84
End: 2024-06-18

## 2024-06-18 ENCOUNTER — TELEMEDICINE (OUTPATIENT)
Dept: FAMILY MEDICINE CLINIC | Facility: CLINIC | Age: 84
End: 2024-06-18
Payer: COMMERCIAL

## 2024-06-18 DIAGNOSIS — U07.1 COVID-19: Primary | ICD-10-CM

## 2024-06-18 PROCEDURE — 1123F ACP DISCUSS/DSCN MKR DOCD: CPT

## 2024-06-18 PROCEDURE — 99214 OFFICE O/P EST MOD 30 MIN: CPT

## 2024-06-18 NOTE — TELEPHONE ENCOUNTER
From: Grace Galarza  To: Dr. Clarice Stuart  Sent: 6/18/2024 3:27 AM EDT  Subject: paxlovid?    I have tested positive for COVID. Three days of symptoms —sneezing, stopped up sinuses, tired and aches. Am better today, but still stuffy and tired. Have had six Covid shots, the last onevnine months ago. Should I take paxlovid? We can talk or you can call [facility] on [address].    Thanks,  Rafael

## 2024-06-18 NOTE — PROGRESS NOTES
Grace Galarza  (1940) is an established patient, here for evaluation of the following:    Assessment & Plan   Below is the assessment and plan developed based on review of pertinent history, physical exam, labs, studies, and medications.    Diagnoses and all orders for this visit:    COVID-19      Follow Up    Return if symptoms worsen or fail to improve in 3-5 days.          Subjective     HPI    Positive COVID at home on 6/17: per message in chart, endorsed sneezing, congestion, fatigue, and body aches x3 days, was noting to feel improvement in symptoms.     Patient relays she has felt better over the last 24-48 hours. She denies any fevers, shortness of breath, difficulties breathing, chest congestion, copious secretions, or chest pains. She is not diabetic, no lung hx. Currently on two medications for HTN. We discussed Paxlovid, and considering comorbidities as well as mild symptoms, we were both in agreement to hold Paxlovid at this time. I encouraged that she continue to monitor her symptoms. Recommended Flonase for congestion, she is doing hot showers throughout the day. I also recommended she continue Vitamin D supplementation daily.     We discussed S/S that warrant further eval including but not limited to: fever 100.4 or higher, shortness of breath, chest pains, difficulties breathing, chest congestion, and worsening cough. Encouraged to seek ER eval if experiencing any difficulties breathing or chest pains. She verbalized understanding.     We discussed masking guidelines and isolation policy per CDC.     Over video, patient is alert and oriented x4, breathing effort appears normal per video. Color appears pink per video. She has a dry cough over video however was not frequent and consistent.       Patient-Reported Vitals       No data to display                    No orders of the defined types were placed in this encounter.       Grace Galarza, was evaluated through a synchronous

## 2024-06-18 NOTE — TELEPHONE ENCOUNTER
VM left for the patient stating that if she wanted plaxlovid, she would need an appointment.  Patient informed via vm and [facility] to make an virtual appointment.

## 2024-07-01 ASSESSMENT — PATIENT HEALTH QUESTIONNAIRE - PHQ9
SUM OF ALL RESPONSES TO PHQ QUESTIONS 1-9: 2
2. FEELING DOWN, DEPRESSED OR HOPELESS: SEVERAL DAYS
SUM OF ALL RESPONSES TO PHQ QUESTIONS 1-9: 2
SUM OF ALL RESPONSES TO PHQ QUESTIONS 1-9: 2
2. FEELING DOWN, DEPRESSED OR HOPELESS: SEVERAL DAYS
1. LITTLE INTEREST OR PLEASURE IN DOING THINGS: SEVERAL DAYS
SUM OF ALL RESPONSES TO PHQ9 QUESTIONS 1 & 2: 2
1. LITTLE INTEREST OR PLEASURE IN DOING THINGS: SEVERAL DAYS
SUM OF ALL RESPONSES TO PHQ QUESTIONS 1-9: 2
SUM OF ALL RESPONSES TO PHQ9 QUESTIONS 1 & 2: 2

## 2024-07-02 ENCOUNTER — OFFICE VISIT (OUTPATIENT)
Dept: FAMILY MEDICINE CLINIC | Facility: CLINIC | Age: 84
End: 2024-07-02
Payer: COMMERCIAL

## 2024-07-02 VITALS
WEIGHT: 194 LBS | OXYGEN SATURATION: 96 % | BODY MASS INDEX: 35.7 KG/M2 | HEIGHT: 62 IN | HEART RATE: 83 BPM | SYSTOLIC BLOOD PRESSURE: 146 MMHG | DIASTOLIC BLOOD PRESSURE: 110 MMHG | TEMPERATURE: 97.7 F

## 2024-07-02 DIAGNOSIS — Z78.0 POST-MENOPAUSAL: ICD-10-CM

## 2024-07-02 DIAGNOSIS — I10 ESSENTIAL (PRIMARY) HYPERTENSION: ICD-10-CM

## 2024-07-02 DIAGNOSIS — Z00.00 WELL ADULT EXAM: Primary | ICD-10-CM

## 2024-07-02 PROCEDURE — 3077F SYST BP >= 140 MM HG: CPT | Performed by: FAMILY MEDICINE

## 2024-07-02 PROCEDURE — 3080F DIAST BP >= 90 MM HG: CPT | Performed by: FAMILY MEDICINE

## 2024-07-02 PROCEDURE — 99397 PER PM REEVAL EST PAT 65+ YR: CPT | Performed by: FAMILY MEDICINE

## 2024-07-02 RX ORDER — CARVEDILOL 6.25 MG/1
6.25 TABLET ORAL 2 TIMES DAILY WITH MEALS
Qty: 90 TABLET | Refills: 3 | Status: SHIPPED | OUTPATIENT
Start: 2024-07-02

## 2024-07-02 RX ORDER — LISINOPRIL 20 MG/1
20 TABLET ORAL DAILY
Qty: 90 TABLET | Refills: 3 | Status: SHIPPED | OUTPATIENT
Start: 2024-07-02

## 2024-07-02 RX ORDER — CARVEDILOL 12.5 MG/1
12.5 TABLET ORAL 2 TIMES DAILY WITH MEALS
Qty: 180 TABLET | Refills: 3 | Status: SHIPPED | OUTPATIENT
Start: 2024-07-02 | End: 2024-07-02

## 2024-07-02 SDOH — ECONOMIC STABILITY: FOOD INSECURITY: WITHIN THE PAST 12 MONTHS, THE FOOD YOU BOUGHT JUST DIDN'T LAST AND YOU DIDN'T HAVE MONEY TO GET MORE.: NEVER TRUE

## 2024-07-02 SDOH — ECONOMIC STABILITY: FOOD INSECURITY: WITHIN THE PAST 12 MONTHS, YOU WORRIED THAT YOUR FOOD WOULD RUN OUT BEFORE YOU GOT MONEY TO BUY MORE.: NEVER TRUE

## 2024-07-02 SDOH — ECONOMIC STABILITY: INCOME INSECURITY: HOW HARD IS IT FOR YOU TO PAY FOR THE VERY BASICS LIKE FOOD, HOUSING, MEDICAL CARE, AND HEATING?: NOT VERY HARD

## 2024-07-02 SDOH — ECONOMIC STABILITY: HOUSING INSECURITY
IN THE LAST 12 MONTHS, WAS THERE A TIME WHEN YOU DID NOT HAVE A STEADY PLACE TO SLEEP OR SLEPT IN A SHELTER (INCLUDING NOW)?: NO

## 2024-07-02 ASSESSMENT — ENCOUNTER SYMPTOMS
GASTROINTESTINAL NEGATIVE: 1
EYES NEGATIVE: 1
RESPIRATORY NEGATIVE: 1
ALLERGIC/IMMUNOLOGIC NEGATIVE: 1

## 2024-07-03 ENCOUNTER — NURSE ONLY (OUTPATIENT)
Dept: FAMILY MEDICINE CLINIC | Facility: CLINIC | Age: 84
End: 2024-07-03

## 2024-07-03 DIAGNOSIS — Z00.00 WELL ADULT EXAM: ICD-10-CM

## 2024-07-03 DIAGNOSIS — I10 ESSENTIAL (PRIMARY) HYPERTENSION: ICD-10-CM

## 2024-07-03 DIAGNOSIS — Z78.0 POST-MENOPAUSAL: ICD-10-CM

## 2024-07-03 LAB
25(OH)D3 SERPL-MCNC: 29.2 NG/ML (ref 30–100)
ALBUMIN SERPL-MCNC: 3.8 G/DL (ref 3.2–4.6)
ALBUMIN/GLOB SERPL: 1.3 (ref 1–1.9)
ALP SERPL-CCNC: 93 U/L (ref 35–104)
ALT SERPL-CCNC: 20 U/L (ref 12–65)
ANION GAP SERPL CALC-SCNC: 11 MMOL/L (ref 9–18)
AST SERPL-CCNC: 23 U/L (ref 15–37)
BASOPHILS # BLD: 0.1 K/UL (ref 0–0.2)
BASOPHILS NFR BLD: 1 % (ref 0–2)
BILIRUB SERPL-MCNC: 0.4 MG/DL (ref 0–1.2)
BUN SERPL-MCNC: 17 MG/DL (ref 8–23)
CALCIUM SERPL-MCNC: 9.9 MG/DL (ref 8.8–10.2)
CHLORIDE SERPL-SCNC: 103 MMOL/L (ref 98–107)
CHOLEST SERPL-MCNC: 252 MG/DL (ref 0–200)
CO2 SERPL-SCNC: 25 MMOL/L (ref 20–28)
CREAT SERPL-MCNC: 0.86 MG/DL (ref 0.6–1.1)
DIFFERENTIAL METHOD BLD: ABNORMAL
EOSINOPHIL # BLD: 0.2 K/UL (ref 0–0.8)
EOSINOPHIL NFR BLD: 3 % (ref 0.5–7.8)
ERYTHROCYTE [DISTWIDTH] IN BLOOD BY AUTOMATED COUNT: 12.9 % (ref 11.9–14.6)
EST. AVERAGE GLUCOSE BLD GHB EST-MCNC: 123 MG/DL
GLOBULIN SER CALC-MCNC: 3 G/DL (ref 2.3–3.5)
GLUCOSE SERPL-MCNC: 84 MG/DL (ref 70–99)
HBA1C MFR BLD: 5.9 % (ref 0–5.6)
HCT VFR BLD AUTO: 42.3 % (ref 35.8–46.3)
HDLC SERPL-MCNC: 47 MG/DL (ref 40–60)
HDLC SERPL: 5.4 (ref 0–5)
HGB BLD-MCNC: 13.3 G/DL (ref 11.7–15.4)
IMM GRANULOCYTES # BLD AUTO: 0 K/UL (ref 0–0.5)
IMM GRANULOCYTES NFR BLD AUTO: 0 % (ref 0–5)
LDLC SERPL CALC-MCNC: 173 MG/DL (ref 0–100)
LYMPHOCYTES # BLD: 1.2 K/UL (ref 0.5–4.6)
LYMPHOCYTES NFR BLD: 19 % (ref 13–44)
MCH RBC QN AUTO: 30.9 PG (ref 26.1–32.9)
MCHC RBC AUTO-ENTMCNC: 31.4 G/DL (ref 31.4–35)
MCV RBC AUTO: 98.1 FL (ref 82–102)
MONOCYTES # BLD: 0.9 K/UL (ref 0.1–1.3)
MONOCYTES NFR BLD: 14 % (ref 4–12)
NEUTS SEG # BLD: 4 K/UL (ref 1.7–8.2)
NEUTS SEG NFR BLD: 63 % (ref 43–78)
NRBC # BLD: 0 K/UL (ref 0–0.2)
PLATELET # BLD AUTO: 268 K/UL (ref 150–450)
PMV BLD AUTO: 11.8 FL (ref 9.4–12.3)
POTASSIUM SERPL-SCNC: 4.9 MMOL/L (ref 3.5–5.1)
PROT SERPL-MCNC: 6.8 G/DL (ref 6.3–8.2)
RBC # BLD AUTO: 4.31 M/UL (ref 4.05–5.2)
SODIUM SERPL-SCNC: 139 MMOL/L (ref 136–145)
TRIGL SERPL-MCNC: 161 MG/DL (ref 0–150)
TSH, 3RD GENERATION: 0.92 UIU/ML (ref 0.27–4.2)
VLDLC SERPL CALC-MCNC: 32 MG/DL (ref 6–23)
WBC # BLD AUTO: 6.4 K/UL (ref 4.3–11.1)

## 2024-07-05 ENCOUNTER — PATIENT MESSAGE (OUTPATIENT)
Dept: FAMILY MEDICINE CLINIC | Facility: CLINIC | Age: 84
End: 2024-07-05

## 2024-07-05 RX ORDER — ROSUVASTATIN CALCIUM 5 MG/1
5 TABLET, COATED ORAL NIGHTLY
Qty: 30 TABLET | Refills: 3 | Status: SHIPPED | OUTPATIENT
Start: 2024-07-05

## 2024-07-05 NOTE — TELEPHONE ENCOUNTER
From: Grace Galarza  To: Dr. Clarice Stuart  Sent: 7/5/2024 9:33 AM EDT  Subject: Cholesterol     Dr. Stuart?    What do you think about waiting a month or six weeks before trying medication? I can redouble my efforts to loose weight. Cut out all cheese, and eat more fruits than I do. ( I already eat plenty of vegetables.)Then test in August?    Every one in my family has had high cholesterol, so I may not be able to control it with just diet and exercise.     Thanks,  Rafael

## 2024-10-28 RX ORDER — ROSUVASTATIN CALCIUM 5 MG/1
5 TABLET, COATED ORAL NIGHTLY
Qty: 30 TABLET | Refills: 3 | OUTPATIENT
Start: 2024-10-28

## 2024-10-31 ENCOUNTER — TELEPHONE (OUTPATIENT)
Dept: FAMILY MEDICINE CLINIC | Facility: CLINIC | Age: 84
End: 2024-10-31

## 2024-10-31 NOTE — TELEPHONE ENCOUNTER
Medication Refill Request    Name of Medication : rosuvastatin (CRESTOR)    Strength of Medication: 5 MG    Directions: Take 1 tablet by mouth nightly    30 day or 90 day supply: 30    Preferred Pharmacy: [id number] - [place], [place] - [address] [facility] [address] [phone] - F [phone]  [address] [facility] [address]        Last Appt. Date: 7/03/24    Next Appt. Date: 1/09/25    Additional Information For Provider:

## 2024-11-01 RX ORDER — ROSUVASTATIN CALCIUM 5 MG/1
5 TABLET, COATED ORAL NIGHTLY
Qty: 30 TABLET | Refills: 3 | Status: SHIPPED | OUTPATIENT
Start: 2024-11-01

## 2025-01-06 ASSESSMENT — PATIENT HEALTH QUESTIONNAIRE - PHQ9
SUM OF ALL RESPONSES TO PHQ QUESTIONS 1-9: 1
1. LITTLE INTEREST OR PLEASURE IN DOING THINGS: SEVERAL DAYS
2. FEELING DOWN, DEPRESSED OR HOPELESS: NOT AT ALL
SUM OF ALL RESPONSES TO PHQ9 QUESTIONS 1 & 2: 1
SUM OF ALL RESPONSES TO PHQ9 QUESTIONS 1 & 2: 1
2. FEELING DOWN, DEPRESSED OR HOPELESS: NOT AT ALL
1. LITTLE INTEREST OR PLEASURE IN DOING THINGS: SEVERAL DAYS
SUM OF ALL RESPONSES TO PHQ QUESTIONS 1-9: 1

## 2025-01-09 ENCOUNTER — OFFICE VISIT (OUTPATIENT)
Dept: FAMILY MEDICINE CLINIC | Facility: CLINIC | Age: 85
End: 2025-01-09

## 2025-01-09 VITALS
OXYGEN SATURATION: 97 % | WEIGHT: 194 LBS | DIASTOLIC BLOOD PRESSURE: 122 MMHG | BODY MASS INDEX: 35.7 KG/M2 | TEMPERATURE: 97.5 F | SYSTOLIC BLOOD PRESSURE: 162 MMHG | HEIGHT: 62 IN | HEART RATE: 75 BPM

## 2025-01-09 DIAGNOSIS — F41.1 GENERALIZED ANXIETY DISORDER: ICD-10-CM

## 2025-01-09 DIAGNOSIS — G89.29 CHRONIC PAIN OF BOTH KNEES: ICD-10-CM

## 2025-01-09 DIAGNOSIS — I10 ESSENTIAL (PRIMARY) HYPERTENSION: Primary | ICD-10-CM

## 2025-01-09 DIAGNOSIS — M25.562 CHRONIC PAIN OF BOTH KNEES: ICD-10-CM

## 2025-01-09 DIAGNOSIS — G89.29 CHRONIC PAIN OF RIGHT KNEE: ICD-10-CM

## 2025-01-09 DIAGNOSIS — M25.561 CHRONIC PAIN OF BOTH KNEES: ICD-10-CM

## 2025-01-09 DIAGNOSIS — M25.561 CHRONIC PAIN OF RIGHT KNEE: ICD-10-CM

## 2025-01-09 RX ORDER — LISINOPRIL 20 MG/1
20 TABLET ORAL DAILY
Qty: 90 TABLET | Refills: 3 | Status: SHIPPED | OUTPATIENT
Start: 2025-01-09

## 2025-01-09 RX ORDER — ROSUVASTATIN CALCIUM 5 MG/1
5 TABLET, COATED ORAL NIGHTLY
Qty: 30 TABLET | Refills: 11 | Status: SHIPPED | OUTPATIENT
Start: 2025-01-09

## 2025-01-09 RX ORDER — HYDROXYZINE PAMOATE 25 MG/1
25 CAPSULE ORAL 3 TIMES DAILY PRN
Qty: 90 CAPSULE | Refills: 1 | Status: SHIPPED | OUTPATIENT
Start: 2025-01-09

## 2025-01-09 RX ORDER — CARVEDILOL 6.25 MG/1
6.25 TABLET ORAL 2 TIMES DAILY WITH MEALS
Qty: 90 TABLET | Refills: 3 | Status: SHIPPED | OUTPATIENT
Start: 2025-01-09

## 2025-01-09 SDOH — ECONOMIC STABILITY: FOOD INSECURITY: WITHIN THE PAST 12 MONTHS, THE FOOD YOU BOUGHT JUST DIDN'T LAST AND YOU DIDN'T HAVE MONEY TO GET MORE.: NEVER TRUE

## 2025-01-09 SDOH — ECONOMIC STABILITY: FOOD INSECURITY: WITHIN THE PAST 12 MONTHS, YOU WORRIED THAT YOUR FOOD WOULD RUN OUT BEFORE YOU GOT MONEY TO BUY MORE.: NEVER TRUE

## 2025-01-09 NOTE — PROGRESS NOTES
Grace Galarza (: 1940) is a 84 y.o. female, established patient, here for evaluation of the following chief complaint(s):  Follow-up Chronic Condition and Discuss Medications (HYDROXYZINE 25 MG for anxiety/)       ASSESSMENT/PLAN:  1. Essential (primary) hypertension  2. Generalized anxiety disorder  3. Chronic pain of right knee  -     XR KNEE RIGHT (3 VIEWS); Future  -     XR KNEE LEFT (3 VIEWS); Future  4. Chronic pain of both knees  -     XR KNEE LEFT (3 VIEWS); Future    Previously prescribed hydroxyzine for anxiety, will refill this today,  Bp high in office but well controlled at home overall, recent at home value 128/70s,   Recheck labs at wellness visit in 6 months  Worsening knee pain bilaterally, over years, will get xrays to evaluate,  Return to office in 6 months    SUBJECTIVE/OBJECTIVE:  HPI  See above  Hypertension - stable, well controlled, takes medications as prescribed.  denies chest pain, shortness of breath, abdominal pain, nausea, vomiting, diarrhea, dizziness or fainting, headaches or vision changes.        Physical Exam  Vitals and nursing note reviewed.   Constitutional:       General: She is not in acute distress.     Appearance: Normal appearance. She is not ill-appearing.   HENT:      Head: Normocephalic and atraumatic.      Right Ear: External ear normal.      Left Ear: External ear normal.      Nose: Nose normal.      Mouth/Throat:      Mouth: Mucous membranes are dry.   Eyes:      Extraocular Movements: Extraocular movements intact.      Pupils: Pupils are equal, round, and reactive to light.   Cardiovascular:      Rate and Rhythm: Normal rate.      Pulses: Normal pulses.   Pulmonary:      Effort: Pulmonary effort is normal.      Breath sounds: Normal breath sounds.   Abdominal:      General: There is no distension.   Musculoskeletal:         General: Normal range of motion.      Cervical back: Normal range of motion and neck supple.   Skin:     General: Skin is warm

## 2025-01-28 ENCOUNTER — HOSPITAL ENCOUNTER (OUTPATIENT)
Dept: GENERAL RADIOLOGY | Age: 85
Discharge: HOME | End: 2025-01-31
Payer: COMMERCIAL

## 2025-01-28 DIAGNOSIS — G89.29 CHRONIC PAIN OF RIGHT KNEE: ICD-10-CM

## 2025-01-28 DIAGNOSIS — M25.562 CHRONIC PAIN OF BOTH KNEES: ICD-10-CM

## 2025-01-28 DIAGNOSIS — M25.561 CHRONIC PAIN OF RIGHT KNEE: ICD-10-CM

## 2025-01-28 DIAGNOSIS — M25.561 CHRONIC PAIN OF BOTH KNEES: ICD-10-CM

## 2025-01-28 DIAGNOSIS — G89.29 CHRONIC PAIN OF BOTH KNEES: ICD-10-CM

## 2025-01-28 PROCEDURE — 73562 X-RAY EXAM OF KNEE 3: CPT

## 2025-03-21 ENCOUNTER — TELEPHONE (OUTPATIENT)
Dept: FAMILY MEDICINE CLINIC | Facility: CLINIC | Age: 85
End: 2025-03-21

## 2025-03-21 NOTE — TELEPHONE ENCOUNTER
refills     rosuvastatin (CRESTOR) 5 MG tablet [[id number]]    Order Details  Dose: 5 mg Route: Oral Frequency: NIGHTLY   Dispense Quantity: 30 tablet Refills: 11    Pharmacy    [facility] #[id number] - [place], [place] - [address] [facility] [phone] - F [phone]  [address] [facility] [address]  Phone: [phone]  Fax: [phone]

## 2025-03-24 RX ORDER — ROSUVASTATIN CALCIUM 5 MG/1
5 TABLET, COATED ORAL NIGHTLY
Qty: 30 TABLET | Refills: 11 | Status: SHIPPED | OUTPATIENT
Start: 2025-03-24

## 2025-05-19 RX ORDER — CARVEDILOL 12.5 MG/1
12.5 TABLET ORAL 2 TIMES DAILY WITH MEALS
Qty: 180 TABLET | Refills: 3 | OUTPATIENT
Start: 2025-05-19

## 2025-07-12 SDOH — HEALTH STABILITY: PHYSICAL HEALTH: ON AVERAGE, HOW MANY DAYS PER WEEK DO YOU ENGAGE IN MODERATE TO STRENUOUS EXERCISE (LIKE A BRISK WALK)?: 4 DAYS

## 2025-07-12 SDOH — HEALTH STABILITY: PHYSICAL HEALTH: ON AVERAGE, HOW MANY MINUTES DO YOU ENGAGE IN EXERCISE AT THIS LEVEL?: 30 MIN

## 2025-07-12 ASSESSMENT — LIFESTYLE VARIABLES
HOW MANY STANDARD DRINKS CONTAINING ALCOHOL DO YOU HAVE ON A TYPICAL DAY: 1 OR 2
HOW OFTEN DO YOU HAVE A DRINK CONTAINING ALCOHOL: MONTHLY OR LESS
HOW OFTEN DO YOU HAVE A DRINK CONTAINING ALCOHOL: 2
HOW OFTEN DO YOU HAVE SIX OR MORE DRINKS ON ONE OCCASION: 1
HOW MANY STANDARD DRINKS CONTAINING ALCOHOL DO YOU HAVE ON A TYPICAL DAY: 1

## 2025-07-12 ASSESSMENT — PATIENT HEALTH QUESTIONNAIRE - PHQ9
SUM OF ALL RESPONSES TO PHQ QUESTIONS 1-9: 0
1. LITTLE INTEREST OR PLEASURE IN DOING THINGS: NOT AT ALL
2. FEELING DOWN, DEPRESSED OR HOPELESS: NOT AT ALL
SUM OF ALL RESPONSES TO PHQ QUESTIONS 1-9: 0

## 2025-07-14 ENCOUNTER — OFFICE VISIT (OUTPATIENT)
Dept: FAMILY MEDICINE CLINIC | Facility: CLINIC | Age: 85
End: 2025-07-14
Payer: COMMERCIAL

## 2025-07-14 VITALS
HEIGHT: 62 IN | OXYGEN SATURATION: 97 % | BODY MASS INDEX: 36.44 KG/M2 | TEMPERATURE: 97.7 F | DIASTOLIC BLOOD PRESSURE: 80 MMHG | WEIGHT: 198 LBS | HEART RATE: 70 BPM | SYSTOLIC BLOOD PRESSURE: 130 MMHG

## 2025-07-14 DIAGNOSIS — Z00.00 MEDICARE ANNUAL WELLNESS VISIT, SUBSEQUENT: Primary | ICD-10-CM

## 2025-07-14 DIAGNOSIS — E66.01 SEVERE OBESITY (BMI 35.0-35.9 WITH COMORBIDITY) (HCC): ICD-10-CM

## 2025-07-14 DIAGNOSIS — Z78.0 POST-MENOPAUSAL: ICD-10-CM

## 2025-07-14 DIAGNOSIS — M25.561 CHRONIC PAIN OF BOTH KNEES: ICD-10-CM

## 2025-07-14 DIAGNOSIS — I10 ESSENTIAL (PRIMARY) HYPERTENSION: ICD-10-CM

## 2025-07-14 DIAGNOSIS — M25.562 CHRONIC PAIN OF BOTH KNEES: ICD-10-CM

## 2025-07-14 DIAGNOSIS — F41.1 GENERALIZED ANXIETY DISORDER: ICD-10-CM

## 2025-07-14 DIAGNOSIS — G89.29 CHRONIC PAIN OF BOTH KNEES: ICD-10-CM

## 2025-07-14 LAB
25(OH)D3 SERPL-MCNC: 38.9 NG/ML (ref 30–100)
ALBUMIN SERPL-MCNC: 3.9 G/DL (ref 3.2–4.6)
ALBUMIN/GLOB SERPL: 1.3 (ref 1–1.9)
ALP SERPL-CCNC: 84 U/L (ref 35–104)
ALT SERPL-CCNC: 18 U/L (ref 8–45)
ANION GAP SERPL CALC-SCNC: 14 MMOL/L (ref 7–16)
AST SERPL-CCNC: 21 U/L (ref 15–37)
BASOPHILS # BLD: 0.05 K/UL (ref 0–0.2)
BASOPHILS NFR BLD: 0.8 % (ref 0–2)
BILIRUB SERPL-MCNC: 0.6 MG/DL (ref 0–1.2)
BUN SERPL-MCNC: 15 MG/DL (ref 8–23)
CALCIUM SERPL-MCNC: 10.2 MG/DL (ref 8.8–10.2)
CHLORIDE SERPL-SCNC: 105 MMOL/L (ref 98–107)
CHOLEST SERPL-MCNC: 183 MG/DL (ref 0–200)
CO2 SERPL-SCNC: 22 MMOL/L (ref 20–29)
CREAT SERPL-MCNC: 0.72 MG/DL (ref 0.6–1.1)
DIFFERENTIAL METHOD BLD: NORMAL
EOSINOPHIL # BLD: 0.25 K/UL (ref 0–0.8)
EOSINOPHIL NFR BLD: 4.1 % (ref 0.5–7.8)
ERYTHROCYTE [DISTWIDTH] IN BLOOD BY AUTOMATED COUNT: 12.9 % (ref 11.9–14.6)
EST. AVERAGE GLUCOSE BLD GHB EST-MCNC: 121 MG/DL
GLOBULIN SER CALC-MCNC: 3.1 G/DL (ref 2.3–3.5)
GLUCOSE SERPL-MCNC: 93 MG/DL (ref 70–99)
HBA1C MFR BLD: 5.9 % (ref 0–5.6)
HCT VFR BLD AUTO: 44.5 % (ref 35.8–46.3)
HDLC SERPL-MCNC: 55 MG/DL (ref 40–60)
HDLC SERPL: 3.3 (ref 0–5)
HGB BLD-MCNC: 14.2 G/DL (ref 11.7–15.4)
IMM GRANULOCYTES # BLD AUTO: 0.03 K/UL (ref 0–0.5)
IMM GRANULOCYTES NFR BLD AUTO: 0.5 % (ref 0–5)
LDLC SERPL CALC-MCNC: 110 MG/DL (ref 0–100)
LYMPHOCYTES # BLD: 1.18 K/UL (ref 0.5–4.6)
LYMPHOCYTES NFR BLD: 19.3 % (ref 13–44)
MCH RBC QN AUTO: 30.6 PG (ref 26.1–32.9)
MCHC RBC AUTO-ENTMCNC: 31.9 G/DL (ref 31.4–35)
MCV RBC AUTO: 95.9 FL (ref 82–102)
MONOCYTES # BLD: 0.65 K/UL (ref 0.1–1.3)
MONOCYTES NFR BLD: 10.7 % (ref 4–12)
NEUTS SEG # BLD: 3.94 K/UL (ref 1.7–8.2)
NEUTS SEG NFR BLD: 64.6 % (ref 43–78)
NRBC # BLD: 0 K/UL (ref 0–0.2)
PLATELET # BLD AUTO: 246 K/UL (ref 150–450)
PMV BLD AUTO: 11.3 FL (ref 9.4–12.3)
POTASSIUM SERPL-SCNC: 4.5 MMOL/L (ref 3.5–5.1)
PROT SERPL-MCNC: 7 G/DL (ref 6.3–8.2)
RBC # BLD AUTO: 4.64 M/UL (ref 4.05–5.2)
SODIUM SERPL-SCNC: 140 MMOL/L (ref 136–145)
TRIGL SERPL-MCNC: 91 MG/DL (ref 0–150)
TSH, 3RD GENERATION: 1.44 UIU/ML (ref 0.27–4.2)
VLDLC SERPL CALC-MCNC: 18 MG/DL (ref 6–23)
WBC # BLD AUTO: 6.1 K/UL (ref 4.3–11.1)

## 2025-07-14 PROCEDURE — 99213 OFFICE O/P EST LOW 20 MIN: CPT | Performed by: FAMILY MEDICINE

## 2025-07-14 PROCEDURE — G0439 PPPS, SUBSEQ VISIT: HCPCS | Performed by: FAMILY MEDICINE

## 2025-07-14 PROCEDURE — 1159F MED LIST DOCD IN RCRD: CPT | Performed by: FAMILY MEDICINE

## 2025-07-14 PROCEDURE — 1126F AMNT PAIN NOTED NONE PRSNT: CPT | Performed by: FAMILY MEDICINE

## 2025-07-14 PROCEDURE — 1123F ACP DISCUSS/DSCN MKR DOCD: CPT | Performed by: FAMILY MEDICINE

## 2025-07-14 RX ORDER — HYDROXYZINE PAMOATE 25 MG/1
25 CAPSULE ORAL 3 TIMES DAILY PRN
Qty: 90 CAPSULE | Refills: 1 | Status: SHIPPED | OUTPATIENT
Start: 2025-07-14

## 2025-07-14 RX ORDER — LISINOPRIL 20 MG/1
20 TABLET ORAL DAILY
Qty: 90 TABLET | Refills: 3 | Status: SHIPPED | OUTPATIENT
Start: 2025-07-14

## 2025-07-14 RX ORDER — CARVEDILOL 6.25 MG/1
6.25 TABLET ORAL 2 TIMES DAILY WITH MEALS
Qty: 180 TABLET | Refills: 3 | Status: SHIPPED | OUTPATIENT
Start: 2025-07-14

## 2025-07-14 NOTE — PROGRESS NOTES
Have you been to the ER, urgent care clinic since your last visit?  Hospitalized since your last visit?   NO    Have you seen or consulted any other health care providers outside our system since your last visit?   NO            '

## 2025-07-14 NOTE — PROGRESS NOTES
Grace Galarza (: 1940) is a 84 y.o. female, established patient, here for evaluation of the following chief complaint(s):  Medicare AWV, Follow-up Chronic Condition (Has bp readings/), and Leg Pain (Has pain on the right thigh)           ICD-10-CM    1. Medicare annual wellness visit, subsequent  Z00.00 Hemoglobin A1C     Comprehensive Metabolic Panel     CBC with Auto Differential     TSH     Vitamin D 25 Hydroxy     Lipid Panel     Hemoglobin A1C     TSH     Vitamin D 25 Hydroxy     CBC with Auto Differential     Lipid Panel     Comprehensive Metabolic Panel      2. Essential (primary) hypertension  I10 Hemoglobin A1C     Comprehensive Metabolic Panel     CBC with Auto Differential     TSH     Vitamin D 25 Hydroxy     Lipid Panel     Hemoglobin A1C     TSH     Vitamin D 25 Hydroxy     CBC with Auto Differential     Lipid Panel     Comprehensive Metabolic Panel      3. Generalized anxiety disorder  F41.1 Hemoglobin A1C     Comprehensive Metabolic Panel     CBC with Auto Differential     TSH     Vitamin D 25 Hydroxy     Lipid Panel     Hemoglobin A1C     TSH     Vitamin D 25 Hydroxy     CBC with Auto Differential     Lipid Panel     Comprehensive Metabolic Panel      4. Chronic pain of both knees  M25.561 Hemoglobin A1C    M25.562 Comprehensive Metabolic Panel    G89.29 CBC with Auto Differential     TSH     Vitamin D 25 Hydroxy     Lipid Panel     Hemoglobin A1C     TSH     Vitamin D 25 Hydroxy     CBC with Auto Differential     Lipid Panel     Comprehensive Metabolic Panel      5. Post-menopausal  Z78.0 Hemoglobin A1C     Comprehensive Metabolic Panel     CBC with Auto Differential     TSH     Vitamin D 25 Hydroxy     Lipid Panel     Hemoglobin A1C     TSH     Vitamin D 25 Hydroxy     CBC with Auto Differential     Lipid Panel     Comprehensive Metabolic Panel      6. Severe obesity (BMI 35.0-35.9 with comorbidity) ([facility])  E66.01 Hemoglobin A1C    Z68.35 Comprehensive Metabolic Panel     CBC with

## 2025-07-14 NOTE — PATIENT INSTRUCTIONS
Call [phone]   [facility] for an appointment    Please use debrox over the counter drops in your ears every night for next 3 weeks, and if no improvement/wax comes out, let us know and we can see you in the office to remove the wax.     Learning About Vision Tests  What are vision tests?     The four most common vision tests are visual acuity tests, refraction, visual field tests, and color vision tests.  Visual acuity (sharpness) tests  These tests are used:  To see if you need glasses or contact lenses.  To monitor an eye problem.  To check an eye injury.  Visual acuity tests are done as part of routine exams. You may also have this test when you get your 's license or apply for some types of jobs.  Visual field tests  These tests are used:  To check for vision loss in any area of your range of vision.  To screen for certain eye diseases.  To look for nerve damage after a stroke, head injury, or other problem that could reduce blood flow to the brain.  Refraction and color tests  A refraction test is done to find the right prescription for glasses and contact lenses.  A color vision test is done to check for color blindness.  Color vision is often tested as part of a routine exam. You may also have this test when you apply for a job where recognizing different colors is important, such as , electronics, or the .  How are vision tests done?  Visual acuity test   You cover one eye at a time.  You read aloud from a wall chart across the room.  You read aloud from a small card that you hold in your hand.  Refraction   You look into a special device.  The device puts lenses of different strengths in front of each eye to see how strong your glasses or contact lenses need to be.  Visual field tests   Your doctor may have you look through special machines.  Or your doctor may simply have you stare straight ahead while they move a finger into and out of your field of